# Patient Record
Sex: FEMALE | Race: WHITE | NOT HISPANIC OR LATINO | Employment: UNEMPLOYED | URBAN - METROPOLITAN AREA
[De-identification: names, ages, dates, MRNs, and addresses within clinical notes are randomized per-mention and may not be internally consistent; named-entity substitution may affect disease eponyms.]

---

## 2019-07-27 PROBLEM — R29.2 HYPERREFLEXIA: Status: ACTIVE | Noted: 2019-07-27

## 2019-07-27 PROBLEM — R20.0 RIGHT SIDED NUMBNESS: Status: ACTIVE | Noted: 2019-07-27

## 2019-07-27 PROBLEM — R29.810 FACIAL DROOP: Status: ACTIVE | Noted: 2019-07-27

## 2019-07-27 PROBLEM — I63.9 INFARCTION OF LEFT BASAL GANGLIA: Status: ACTIVE | Noted: 2019-07-27

## 2019-07-27 PROBLEM — R25.2 SPASTICITY: Status: ACTIVE | Noted: 2019-07-27

## 2019-07-28 PROBLEM — R51.9 HEADACHE: Status: ACTIVE | Noted: 2019-07-28

## 2019-07-29 PROBLEM — I47.29 NSVT (NONSUSTAINED VENTRICULAR TACHYCARDIA): Status: ACTIVE | Noted: 2019-07-29

## 2019-07-29 PROBLEM — R00.0 TACHYCARDIA: Status: ACTIVE | Noted: 2019-07-29

## 2019-07-30 PROBLEM — R00.0 TACHYCARDIA: Status: RESOLVED | Noted: 2019-07-29 | Resolved: 2019-07-30

## 2019-08-01 ENCOUNTER — HOSPITAL ENCOUNTER (INPATIENT)
Facility: HOSPITAL | Age: 39
LOS: 4 days | Discharge: HOME | DRG: 091 | End: 2019-08-05
Attending: PSYCHIATRY & NEUROLOGY | Admitting: PSYCHIATRY & NEUROLOGY
Payer: MEDICAID

## 2019-08-01 DIAGNOSIS — I63.9 STROKE: ICD-10-CM

## 2019-08-01 PROBLEM — R29.810 FACIAL DROOP: Status: RESOLVED | Noted: 2019-07-27 | Resolved: 2019-08-01

## 2019-08-01 LAB
BACTERIA #/AREA URNS AUTO: NORMAL /HPF
BILIRUB UR QL STRIP: NEGATIVE
CLARITY UR REFRACT.AUTO: ABNORMAL
COLOR UR AUTO: YELLOW
GLUCOSE UR QL STRIP: NEGATIVE
HGB UR QL STRIP: ABNORMAL
KETONES UR QL STRIP: NEGATIVE
LEUKOCYTE ESTERASE UR QL STRIP: NEGATIVE
MICROSCOPIC COMMENT: NORMAL
NITRITE UR QL STRIP: NEGATIVE
PH UR STRIP: 5 [PH] (ref 5–8)
PROT UR QL STRIP: NEGATIVE
RBC #/AREA URNS AUTO: 2 /HPF (ref 0–4)
SP GR UR STRIP: 1.02 (ref 1–1.03)
SQUAMOUS #/AREA URNS AUTO: 7 /HPF
URN SPEC COLLECT METH UR: ABNORMAL
WBC #/AREA URNS AUTO: 0 /HPF (ref 0–5)

## 2019-08-01 PROCEDURE — 20600001 HC STEP DOWN PRIVATE ROOM

## 2019-08-01 PROCEDURE — 81001 URINALYSIS AUTO W/SCOPE: CPT

## 2019-08-01 PROCEDURE — 99223 1ST HOSP IP/OBS HIGH 75: CPT | Mod: ,,, | Performed by: PSYCHIATRY & NEUROLOGY

## 2019-08-01 PROCEDURE — 63600175 PHARM REV CODE 636 W HCPCS: Performed by: STUDENT IN AN ORGANIZED HEALTH CARE EDUCATION/TRAINING PROGRAM

## 2019-08-01 PROCEDURE — 99223 PR INITIAL HOSPITAL CARE,LEVL III: ICD-10-PCS | Mod: ,,, | Performed by: PSYCHIATRY & NEUROLOGY

## 2019-08-01 PROCEDURE — 25000003 PHARM REV CODE 250: Performed by: STUDENT IN AN ORGANIZED HEALTH CARE EDUCATION/TRAINING PROGRAM

## 2019-08-01 RX ORDER — CLOPIDOGREL BISULFATE 75 MG/1
75 TABLET ORAL DAILY
Status: DISCONTINUED | OUTPATIENT
Start: 2019-08-01 | End: 2019-08-05 | Stop reason: HOSPADM

## 2019-08-01 RX ORDER — RAMELTEON 8 MG/1
8 TABLET ORAL NIGHTLY PRN
Status: DISCONTINUED | OUTPATIENT
Start: 2019-08-01 | End: 2019-08-05 | Stop reason: HOSPADM

## 2019-08-01 RX ORDER — ASPIRIN 81 MG/1
81 TABLET ORAL DAILY
Status: DISCONTINUED | OUTPATIENT
Start: 2019-08-02 | End: 2019-08-01

## 2019-08-01 RX ORDER — ACETAMINOPHEN 325 MG/1
650 TABLET ORAL EVERY 6 HOURS PRN
Status: DISCONTINUED | OUTPATIENT
Start: 2019-08-01 | End: 2019-08-05 | Stop reason: HOSPADM

## 2019-08-01 RX ORDER — SODIUM CHLORIDE 0.9 % (FLUSH) 0.9 %
10 SYRINGE (ML) INJECTION
Status: DISCONTINUED | OUTPATIENT
Start: 2019-08-01 | End: 2019-08-05 | Stop reason: HOSPADM

## 2019-08-01 RX ORDER — HEPARIN SODIUM 5000 [USP'U]/ML
5000 INJECTION, SOLUTION INTRAVENOUS; SUBCUTANEOUS ONCE
Status: COMPLETED | OUTPATIENT
Start: 2019-08-01 | End: 2019-08-01

## 2019-08-01 RX ORDER — ASPIRIN 325 MG
325 TABLET, DELAYED RELEASE (ENTERIC COATED) ORAL DAILY
Status: DISCONTINUED | OUTPATIENT
Start: 2019-08-01 | End: 2019-08-05 | Stop reason: HOSPADM

## 2019-08-01 RX ORDER — CLOPIDOGREL BISULFATE 75 MG/1
75 TABLET ORAL DAILY
Status: DISCONTINUED | OUTPATIENT
Start: 2019-08-02 | End: 2019-08-01

## 2019-08-01 RX ORDER — ATORVASTATIN CALCIUM 20 MG/1
40 TABLET, FILM COATED ORAL DAILY
Status: DISCONTINUED | OUTPATIENT
Start: 2019-08-02 | End: 2019-08-01

## 2019-08-01 RX ORDER — ONDANSETRON 4 MG/1
4 TABLET, FILM COATED ORAL EVERY 8 HOURS PRN
Status: DISCONTINUED | OUTPATIENT
Start: 2019-08-01 | End: 2019-08-05 | Stop reason: HOSPADM

## 2019-08-01 RX ORDER — ATORVASTATIN CALCIUM 20 MG/1
80 TABLET, FILM COATED ORAL DAILY
Status: DISCONTINUED | OUTPATIENT
Start: 2019-08-02 | End: 2019-08-05 | Stop reason: HOSPADM

## 2019-08-01 RX ORDER — LIDOCAINE 50 MG/G
OINTMENT TOPICAL
Status: DISCONTINUED | OUTPATIENT
Start: 2019-08-01 | End: 2019-08-05 | Stop reason: HOSPADM

## 2019-08-01 RX ORDER — ASPIRIN 325 MG
325 TABLET, DELAYED RELEASE (ENTERIC COATED) ORAL DAILY
Status: DISCONTINUED | OUTPATIENT
Start: 2019-08-02 | End: 2019-08-01

## 2019-08-01 RX ADMIN — ASPIRIN 325 MG: 325 TABLET, COATED ORAL at 07:08

## 2019-08-01 RX ADMIN — ONDANSETRON HYDROCHLORIDE 4 MG: 4 TABLET, FILM COATED ORAL at 07:08

## 2019-08-01 RX ADMIN — HEPARIN SODIUM 5000 UNITS: 5000 INJECTION, SOLUTION INTRAVENOUS; SUBCUTANEOUS at 07:08

## 2019-08-01 RX ADMIN — RAMELTEON 8 MG: 8 TABLET, FILM COATED ORAL at 09:08

## 2019-08-01 RX ADMIN — LIDOCAINE: 50 OINTMENT TOPICAL at 08:08

## 2019-08-01 RX ADMIN — CLOPIDOGREL 75 MG: 75 TABLET, FILM COATED ORAL at 07:08

## 2019-08-01 NOTE — PROGRESS NOTES
Pt arrived to unit; no c/o pain; no acute distressed noted; family at bedside; oriented to room and call light,  instructed you call for assistance; all safety measures in place, will continue to monitor pt carefully.

## 2019-08-01 NOTE — HPI
40 yo F who was previously healthy until June 21st stroke presenting with R hemiparesis and speech difficulty, found to have L basal ganglia stroke.  Started on DAPT and had some PT/OT/SLP.  Began to have some improvement in symptoms but then noted LLE shaking/cramping on 7/1/19, admitted at [facility] (records at bedside) with negative work up for LLE pathology, started on baclofen.  Patient states that she has been having headaches over the past month or two, had been taking NSAIDs, now has prn Fioricet. During this admission to [place] for the LLE symptoms an MRI Brain was repeated with no new strokes.  CTA was also done and was concerning for vasculitis.  She was started on 5 d IV steroids with some further improvement in R-sided weakness and speech.  Sent home, continued to participate in PT/OT/SLP.  July 27th, patient was brought to [facility] for worsening memory, with family thinking they were part of the [facility] system and would maybe be able to determine what was causing patient's strokes more than [facility].  Another MRI Brain was done which showed only the L BG stroke, but another CTA showed significant narrowing of the distal L M1 and radiology report suggested vasculitis.  Headaches have been present throughout.  [facility] Neurologist started patient on acyclovir empirically while awaiting LP results to protect her from HSV vasculitis.  Noted to have PBA at [facility].  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  Plan for transfer to [facility] for IR angiogram to further work up question of vasculitis vs Moyamoya vs RCVS.  Patient is seen today at bedside with her niece.  [place] records are at bedside for review as well.  Both have difficulty recalling specific medications but seem to understand the general plan.  See below for work up from OSH.  Patient feels well today, ASA and plavix were held at OSH for  LP.  Can hold for IR angiogram with plan for am procedure and resuming antiplatelets afterward.

## 2019-08-01 NOTE — ASSESSMENT & PLAN NOTE
-prn acetaminophen, prn topical lidocaine gel  -Had been taking Fioricet outpatient, need to limit to < 3 days per week to avoid rebound headache

## 2019-08-01 NOTE — H&P
[facility]  Vascular Neurology  Comprehensive Stroke Center  History & Physical    Consults  Assessment/Plan:     38 yo F who was previously healthy until June 21st stroke presenting with R hemiparesis and speech difficulty, found to have L basal ganglia stroke. Transferred to [facility] for IR cerebral angiogram due to concern for vasculitis initially then Moyamoya disease on vessel imaging.    Infarction of left basal ganglia  38 yo previously healthy F with recent L basal ganglia stroke leading to dysarthria and R-sided weakness as well as several weeks of intermittent symptoms of confusion, LLE spasticity, and emotional lability following initial stroke.  Work up notable for vessel imaging initially concerning for vasculitis, later with radiology noting Moyamoya pathology.  Transferred to [facility] for IR angiogram to evaluate.    Antithrombotics for secondary stroke prevention:  Start ASA, clopidogrel after angiogram  Statins for secondary stroke prevention/HLD: Atorvastatin 40 mg qd  Aggressive risk factor modification: Diet, Exercise  Rehab efforts: PT/OT/SLP  Diagnostics ordered/pending: Other: IR cerebral angiogram  VTE prophylaxis: Heparin 5000 units SQ every 8 hours  BP parameters: Infarct: No intervention, SBP <220    Unsteady gait  -S/p L basal ganglia infarct, PT/OT to evaluate and treat    Dysarthria  -S/p L basal ganglia infarct, SLP to evaluate and treat    Right sided numbness  -S/p L basal ganglia infarct, PT/OT to evaluate and treat    Spasticity  -Continue baclofen 5 mg bid prn muscle spasms, started at OSH.  Titrate up as needed.    Headache  -prn acetaminophen, prn topical lidocaine gel  -Had been taking Fioricet outpatient, need to limit to < 3 days per week to avoid rebound headache    STROKE DOCUMENTATION      NIH Scale:  Interval: baseline  1a. Level of Consciousness: 0-->Alert, keenly responsive  1b. LOC Questions: 0-->Answers both questions correctly  1c. LOC Commands:  0-->Performs both tasks correctly  2. Best Gaze: 0-->Normal  3. Visual: 0-->No visual loss  4. Facial Palsy: 0-->Normal symmetrical movements  5a. Motor Arm, Left: 0-->No drift, limb holds 90 (or 45) degrees for full 10 secs  5b. Motor Arm, Right: 1-->Drift, limb holds 90 (or 45) degrees, but drifts down before full 10 secs, does not hit bed or other support  6a. Motor Leg, Left: 0-->No drift, leg holds 30 degree position for full 5 secs  6b. Motor Leg, Right: 0-->No drift, leg holds 30 degree position for full 5 secs  7. Limb Ataxia: 0-->Absent  8. Sensory: 0-->Normal, no sensory loss  9. Best Language: 1-->Mild-to-moderate aphasia, some obvious loss of fluency or facility of comprehension, without significant limitation on ideas expressed or form of expression. Reduction of speech and/or comprehension, however, makes conversation. . . (see row details)(Slow speech, rare word-finding and naming difficulty)  10. Dysarthria: 1-->Mild-to-moderate dysarthria, patient slurs at least some words and, at worst, can be understood with some difficulty  11. Extinction and Inattention (formerly Neglect): 0-->No abnormality  Total (NIH Stroke Scale): 3     Modified Loving Score: 2  [place] Coma Scale:15   ABCD2 Score:    [id number] Score:   HAS -BLED Score:   ICH Score:   Hunt & Sarabia Classification:      Thrombolysis Candidate? No, Out of window     Delays to Thrombolysis?  No    Interventional Revascularization Candidate?   Is the patient eligible for mechanical endovascular reperfusion (FRANCISCO)?  No;  Large core infarct and No; No large vessel occlusion    Hemorrhagic change of an Ischemic Stroke: Does this patient have an ischemic stroke with hemorrhagic changes? No     Subjective:     History of Present Illness:  38 yo F who was previously healthy until June 21st stroke presenting with R hemiparesis and speech difficulty, found to have L basal ganglia stroke.  Started on DAPT and had some PT/OT/SLP.  Began to have some  improvement in symptoms but then noted LLE shaking/cramping on 7/1/19, admitted at [facility] (records at bedside) with negative work up for LLE pathology, started on baclofen.  Patient states that she has been having headaches over the past month or two, had been taking NSAIDs, now has prn Fioricet. During this admission to [place] for the LLE symptoms an MRI Brain was repeated with no new strokes.  CTA was also done and was concerning for vasculitis.  She was started on 5 d IV steroids with some further improvement in R-sided weakness and speech.  Sent home, continued to participate in PT/OT/SLP.  July 27th, patient was brought to [facility] for worsening memory, with family thinking they were part of the [facility] system and would maybe be able to determine what was causing patient's strokes more than [facility].  Another MRI Brain was done which showed only the L BG stroke, but another CTA showed significant narrowing of the distal L M1 and radiology report suggested vasculitis.  Headaches have been present throughout.  [facility] Neurologist started patient on acyclovir empirically while awaiting LP results to protect her from HSV vasculitis.  Noted to have PBA at [facility].  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  Plan for transfer to [facility] for IR angiogram to further work up question of vasculitis vs Moyamoya vs RCVS.  Patient is seen today at bedside with her niece.  [place] records are at bedside for review as well.  Both have difficulty recalling specific medications but seem to understand the general plan.  See below for work up from OSH.  Patient feels well today, ASA and plavix were held at OSH for LP.  Can hold for IR angiogram with plan for am procedure and resuming antiplatelets afterward.          Past Medical History:   Diagnosis Date    High cholesterol      Past Surgical History:   Procedure  Laterality Date    TUBAL LIGATION  2005     Family History   Problem Relation Age of Onset    Hyperlipidemia Mother     Stroke Father     Diabetes Father     Heart disease Father     Hyperlipidemia Sister     Hypertension Sister     Cancer Brother     Asthma Brother      Social History     Tobacco Use    Smoking status: Never Smoker    Smokeless tobacco: Never Used   Substance Use Topics    Alcohol use: Never     Frequency: Never    Drug use: Never     Review of patient's allergies indicates:  No Known Allergies    Medications: I have reviewed the current medication administration record.    Medications Prior to Admission   Medication Sig Dispense Refill Last Dose    aspirin (ECOTRIN) 325 MG EC tablet Take 325 mg by mouth once daily.   7/27/2019    atorvastatin (LIPITOR) 40 MG tablet Take 40 mg by mouth every evening.       baclofen (LIORESAL) 10 MG tablet Take 5 mg by mouth 2 (two) times daily.       butalbital-acetaminophen-caffeine -40 mg (FIORICET, ESGIC) -40 mg per tablet Take 1 tablet by mouth every 4 (four) hours as needed for Pain.       clopidogrel (PLAVIX) 75 mg tablet Take 75 mg by mouth once daily.   7/27/2019    cyanocobalamin, vitamin B-12, (VITAMIN B-12) 50 mcg tablet Take 50 mcg by mouth once daily.   7/27/2019    montelukast (SINGULAIR) 10 mg tablet Take 10 mg by mouth every evening.       potassium gluconate 595 mg (99 mg) Tab Take by mouth once.   7/27/2019     Review of Systems   Constitutional: Positive for fatigue. Negative for chills and fever.   HENT: Negative for rhinorrhea and sneezing.    Eyes: Negative for photophobia and visual disturbance.   Respiratory: Negative for cough and shortness of breath.    Cardiovascular: Negative for palpitations and leg swelling.   Gastrointestinal: Negative for nausea and vomiting.   Musculoskeletal: Positive for neck pain. Negative for neck stiffness.   Skin: Negative for rash and wound.   Neurological: Positive for speech  difficulty, weakness and numbness. Negative for facial asymmetry.   Hematological: Negative for adenopathy. Does not bruise/bleed easily.   Psychiatric/Behavioral: Negative for agitation and confusion.     Objective:     Vital Signs (Most Recent):  Temp: 98.4 °F (36.9 °C) (08/01/19 1530)  Pulse: 97 (08/01/19 1530)  Resp: 17 (08/01/19 1530)  BP: 108/76 (08/01/19 1530)  SpO2: 100 % (08/01/19 1530)    Vital Signs Range (Last 24H):  Temp:  [97.4 °F (36.3 °C)-98.6 °F (37 °C)]   Pulse:  []   Resp:  [16-18]   BP: (100-121)/(64-83)   SpO2:  [97 %-100 %]     Physical Exam  General:  Well-developed, well-nourished, nad  HEENT:  NCAT, PERRLA, EOMI, oropharyngeal membranes non-erythematous/without exudate  Neck:  Supple, normal ROM without nuchal rigidity  Resp:  Symmetric expansion, no increased wob  CVS:  No LE edema, peripheral pulses 2+ (radial, dorsalis pedis)  GI:  Abd soft, non-distended, non-tender to palpation  Neurologic Exam:  Mental Status:  AAOx3.  Speech, thought content appropriate.  Able to spell 'world' forward with 1 error and backward with no errors.  Cranial Nerves:  VFs intact on counting fingers in all quadrants bilaterally.  PERRLA, EOMI.  Facial movement, sensation intact and symmetric.  Palate raises symmetrically, tongue protrudes midline.  Trapezius 5/5 bilaterally.  Motor:  Normal bulk and tone.  BUE shoulder abduction, biceps 5/5.  RUE weakness--+pronator drift, 4+/5 in triceps, 4+/5 in wrist extension, 4+/5  strength.  BLE hip flexors, knee flexion/extension, plantarflexion/dorsiflexion 5/5.  Sensory:  Intact to light touch at all extremities and face without inattention.   Reflexes:  Biceps, brachioradialis, patellar 2+ and symmetric.  No ankle clonus.   Coordination:  FNF, BHARATH, HTS intact with no dysmetria/ataxia/dysdiadochokinesia.  No resting tremor or myoclonus.  Gait:  Normal balance with appropriate arm swing, normal posture, no shuffling, no magnetic gait.  Mild circumduction of  RLE, may be some mild hip flexor weakness.    Neurological Exam:   LOC: alert  Attention Span: Good   Language: Naming impaired  Articulation: Dysarthria  Orientation: Person, Place, Time   Visual Fields: Full  EOM (CN III, IV, VI): Full/intact  Pupils (CN II, III): PERRL  Facial Sensation (CN V): Normal  Facial Movement (CN VII): Symmetric facial expression    Motor: Arm left  Normal 5/5  Leg left  Normal 5/5  Arm right  Paresis: 4/5  Leg right Normal 5/5  Cebellar: No evidence of appendicular or axial ataxia  Sensation: Intact to light touch, temperature and vibration  Tone: Normal tone throughout    Laboratory:  CMP: PENDING    CBC:   Recent Labs   Lab 19  1226   WBC 7.89   RBC 4.34   HGB 12.5   HCT 38.6      MCV 89   MCH 28.8   MCHC 32.4     Lipid Panel:   Recent Labs   Lab 19  0555   CHOL 135   LDLCALC 74.6   HDL 45   TRIG 77     Coagulation:   Recent Labs   Lab 19  0732   INR 0.9   APTT 26.6     Hgb A1C:   Recent Labs   Lab 19  1226   HGBA1C 5.5     TSH:   Recent Labs   Lab 19  1226   TSH 1.470     Diagnostic Results:    Brain imagin19 MRI Brain w/ w/o contrast:  1. Intermediate restricted diffusion signal in the left basal ganglia with faint enhancement and low associated ADC signal is most consistent with a subacute infarct    19 MRI C, T spine demyelinating w/ w/o contrast:  1.  There is a nonspecific, nonenhancing faint high STIR signal focus in the left paramedian ventral aspect of the medulla. This could reflect a demyelinating plaque or an acute lacunar infarction.  2. No cord signal abnormality in the thoracic spine.  3. There is a focal left subarticular disc protrusion causing mild narrowing of the left lateral recess of the canal.    19 MRI Brain w/ w/o contrast:  1. Likely old left posterior putaminal infarct with encephalomalacia.  Subacute infarct of the caudate head and anterior and extreme posterior left putamen.  2. Non-specific slight  increased tonsillar ectopia with mild crowding at the foramen magnum compared to study from 2019 preserved rounding of the inferior tonsillar tips.  3. Stable subtle nonspecific sulcal FLAIR signal abnormality in the occipital lobes bilaterally.    Vessel Imagin19 CTA head and neck:  1.  There is marked narrowing of the distal portion of the M1 segment of the left middle cerebral artery (see for example sequence 13, image 46) and no visible perfusion of the M2 and M3 branches of the left middle cerebral artery.  2. The left anterior cerebral artery is perfused by the right internal carotid artery via the anterior communicating artery.  This is likely a normal variant.  3. There is no other cervical carotid or intracranial vascular abnormality.    19 MRA Brain w/o contrast:  Small left MCA with moyamoya physiology on the left.    Cardiac Evaluation:   19 TTE with CFD, Shunt Study:  · Normal left ventricular systolic function. The estimated ejection fraction is 55%  · Normal LV diastolic function.  · Normal right ventricular systolic function.  · The estimated PA systolic pressure is 17 mm Hg  · Study is negative for shunt    Katie Cerda MD  Comprehensive Stroke Center  Department of Vascular Neurology   [facility]

## 2019-08-01 NOTE — SUBJECTIVE & OBJECTIVE
Past Medical History:   Diagnosis Date    High cholesterol      Past Surgical History:   Procedure Laterality Date    TUBAL LIGATION  2005     Family History   Problem Relation Age of Onset    Hyperlipidemia Mother     Stroke Father     Diabetes Father     Heart disease Father     Hyperlipidemia Sister     Hypertension Sister     Cancer Brother     Asthma Brother      Social History     Tobacco Use    Smoking status: Never Smoker    Smokeless tobacco: Never Used   Substance Use Topics    Alcohol use: Never     Frequency: Never    Drug use: Never     Review of patient's allergies indicates:  No Known Allergies    Medications: I have reviewed the current medication administration record.    Medications Prior to Admission   Medication Sig Dispense Refill Last Dose    aspirin (ECOTRIN) 325 MG EC tablet Take 325 mg by mouth once daily.   7/27/2019    atorvastatin (LIPITOR) 40 MG tablet Take 40 mg by mouth every evening.       baclofen (LIORESAL) 10 MG tablet Take 5 mg by mouth 2 (two) times daily.       butalbital-acetaminophen-caffeine -40 mg (FIORICET, ESGIC) -40 mg per tablet Take 1 tablet by mouth every 4 (four) hours as needed for Pain.       clopidogrel (PLAVIX) 75 mg tablet Take 75 mg by mouth once daily.   7/27/2019    cyanocobalamin, vitamin B-12, (VITAMIN B-12) 50 mcg tablet Take 50 mcg by mouth once daily.   7/27/2019    montelukast (SINGULAIR) 10 mg tablet Take 10 mg by mouth every evening.       potassium gluconate 595 mg (99 mg) Tab Take by mouth once.   7/27/2019     Review of Systems   Constitutional: Positive for fatigue. Negative for chills and fever.   HENT: Negative for rhinorrhea and sneezing.    Eyes: Negative for photophobia and visual disturbance.   Respiratory: Negative for cough and shortness of breath.    Cardiovascular: Negative for palpitations and leg swelling.   Gastrointestinal: Negative for nausea and vomiting.   Musculoskeletal: Positive for neck  pain. Negative for neck stiffness.   Skin: Negative for rash and wound.   Neurological: Positive for speech difficulty, weakness and numbness. Negative for facial asymmetry.   Hematological: Negative for adenopathy. Does not bruise/bleed easily.   Psychiatric/Behavioral: Negative for agitation and confusion.     Objective:     Vital Signs (Most Recent):  Temp: 98.4 °F (36.9 °C) (08/01/19 1530)  Pulse: 97 (08/01/19 1530)  Resp: 17 (08/01/19 1530)  BP: 108/76 (08/01/19 1530)  SpO2: 100 % (08/01/19 1530)    Vital Signs Range (Last 24H):  Temp:  [97.4 °F (36.3 °C)-98.6 °F (37 °C)]   Pulse:  []   Resp:  [16-18]   BP: (100-121)/(64-83)   SpO2:  [97 %-100 %]     Physical Exam  General:  Well-developed, well-nourished, nad  HEENT:  NCAT, PERRLA, EOMI, oropharyngeal membranes non-erythematous/without exudate  Neck:  Supple, normal ROM without nuchal rigidity  Resp:  Symmetric expansion, no increased wob  CVS:  No LE edema, peripheral pulses 2+ (radial, dorsalis pedis)  GI:  Abd soft, non-distended, non-tender to palpation  Neurologic Exam:  Mental Status:  AAOx3.  Speech, thought content appropriate.  Able to spell 'world' forward with 1 error and backward with no errors.  Cranial Nerves:  VFs intact on counting fingers in all quadrants bilaterally.  PERRLA, EOMI.  Facial movement, sensation intact and symmetric.  Palate raises symmetrically, tongue protrudes midline.  Trapezius 5/5 bilaterally.  Motor:  Normal bulk and tone.  BUE shoulder abduction, biceps 5/5.  RUE weakness--+pronator drift, 4+/5 in triceps, 4+/5 in wrist extension, 4+/5  strength.  BLE hip flexors, knee flexion/extension, plantarflexion/dorsiflexion 5/5.  Sensory:  Intact to light touch at all extremities and face without inattention.   Reflexes:  Biceps, brachioradialis, patellar 2+ and symmetric.  No ankle clonus.   Coordination:  FNF, BHARATH, HTS intact with no dysmetria/ataxia/dysdiadochokinesia.  No resting tremor or myoclonus.  Gait:  Normal  balance with appropriate arm swing, normal posture, no shuffling, no magnetic gait.  Mild circumduction of RLE, may be some mild hip flexor weakness.    Neurological Exam:   LOC: alert  Attention Span: Good   Language: Naming impaired  Articulation: Dysarthria  Orientation: Person, Place, Time   Visual Fields: Full  EOM (CN III, IV, VI): Full/intact  Pupils (CN II, III): PERRL  Facial Sensation (CN V): Normal  Facial Movement (CN VII): Symmetric facial expression    Motor: Arm left  Normal 5/5  Leg left  Normal 5/5  Arm right  Paresis: 4/5  Leg right Normal 5/5  Cebellar: No evidence of appendicular or axial ataxia  Sensation: Intact to light touch, temperature and vibration  Tone: Normal tone throughout    Laboratory:  CMP: PENDING    CBC:   Recent Labs   Lab 19  1226   WBC 7.89   RBC 4.34   HGB 12.5   HCT 38.6      MCV 89   MCH 28.8   MCHC 32.4     Lipid Panel:   Recent Labs   Lab 19  0555   CHOL 135   LDLCALC 74.6   HDL 45   TRIG 77     Coagulation:   Recent Labs   Lab 19  0732   INR 0.9   APTT 26.6     Hgb A1C:   Recent Labs   Lab 19  1226   HGBA1C 5.5     TSH:   Recent Labs   Lab 19  1226   TSH 1.470     Diagnostic Results:    Brain imagin19 MRI Brain w/ w/o contrast:  1. Intermediate restricted diffusion signal in the left basal ganglia with faint enhancement and low associated ADC signal is most consistent with a subacute infarct    19 MRI C, T spine demyelinating w/ w/o contrast:  1.  There is a nonspecific, nonenhancing faint high STIR signal focus in the left paramedian ventral aspect of the medulla. This could reflect a demyelinating plaque or an acute lacunar infarction.  2. No cord signal abnormality in the thoracic spine.  3. There is a focal left subarticular disc protrusion causing mild narrowing of the left lateral recess of the canal.    19 MRI Brain w/ w/o contrast:  1. Likely old left posterior putaminal infarct with encephalomalacia.   Subacute infarct of the caudate head and anterior and extreme posterior left putamen.  2. Non-specific slight increased tonsillar ectopia with mild crowding at the foramen magnum compared to study from 2019 preserved rounding of the inferior tonsillar tips.  3. Stable subtle nonspecific sulcal FLAIR signal abnormality in the occipital lobes bilaterally.    Vessel Imagin19 CTA head and neck:  1.  There is marked narrowing of the distal portion of the M1 segment of the left middle cerebral artery (see for example sequence 13, image 46) and no visible perfusion of the M2 and M3 branches of the left middle cerebral artery.  2. The left anterior cerebral artery is perfused by the right internal carotid artery via the anterior communicating artery.  This is likely a normal variant.  3. There is no other cervical carotid or intracranial vascular abnormality.    19 MRA Brain w/o contrast:  Small left MCA with moyamoya physiology on the left.    Cardiac Evaluation:   19 TTE with CFD, Shunt Study:  · Normal left ventricular systolic function. The estimated ejection fraction is 55%  · Normal LV diastolic function.  · Normal right ventricular systolic function.  · The estimated PA systolic pressure is 17 mm Hg  · Study is negative for shunt

## 2019-08-01 NOTE — ASSESSMENT & PLAN NOTE
40 yo previously healthy F with recent L basal ganglia stroke leading to dysarthria and R-sided weakness as well as several weeks of intermittent symptoms of confusion, LLE spasticity, and emotional lability following initial stroke.  Work up notable for vessel imaging initially concerning for vasculitis, later with radiology noting Moyamoya pathology.  Transferred to [facility] for IR angiogram to evaluate.    Antithrombotics for secondary stroke prevention:  Start ASA, clopidogrel after angiogram  Statins for secondary stroke prevention/HLD: Atorvastatin 40 mg qd  Aggressive risk factor modification: Diet, Exercise  Rehab efforts: PT/OT/SLP  Diagnostics ordered/pending: Other: IR cerebral angiogram  VTE prophylaxis: Heparin 5000 units SQ every 8 hours  BP parameters: Infarct: No intervention, SBP <220

## 2019-08-01 NOTE — HOSPITAL COURSE
Ms. Ally Ponce was admitted to Vascular Neurology for acute stroke workup. Stroke etiology suspected to be vasculitis at this time. Angiography findings demonstrated focal tapering of the distal M1; no clear underlying cause upon review of rheumatology panel, CSF studies, etc. obtained at outside facility. 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 Pt c/o intermittent headaches; encouraged good PO hydration and strict medication compliance at home.  Patient discharged with recommendations for outpatient therapy. Family at bedside amenable to plan.     Patient with improvement in stroke symptoms since admission. Inpatient acute stroke work up completed and patient stable for discharge. Please see all appropriate medication changes, imaging results, and necessary follow-up below.

## 2019-08-02 LAB
ALBUMIN SERPL BCP-MCNC: 3.7 G/DL (ref 3.5–5.2)
ALP SERPL-CCNC: 91 U/L (ref 55–135)
ALT SERPL W/O P-5'-P-CCNC: 17 U/L (ref 10–44)
ANION GAP SERPL CALC-SCNC: 9 MMOL/L (ref 8–16)
APTT BLDCRRT: <21 SEC (ref 21–32)
AST SERPL-CCNC: 15 U/L (ref 10–40)
BASOPHILS # BLD AUTO: 0.05 K/UL (ref 0–0.2)
BASOPHILS NFR BLD: 0.8 % (ref 0–1.9)
BILIRUB SERPL-MCNC: 0.3 MG/DL (ref 0.1–1)
BUN SERPL-MCNC: 10 MG/DL (ref 6–20)
CALCIUM SERPL-MCNC: 9.1 MG/DL (ref 8.7–10.5)
CHLORIDE SERPL-SCNC: 104 MMOL/L (ref 95–110)
CK MB SERPL-MCNC: 0.8 NG/ML (ref 0.1–6.5)
CK MB SERPL-RTO: 1.8 % (ref 0–5)
CK SERPL-CCNC: 45 U/L (ref 20–180)
CO2 SERPL-SCNC: 25 MMOL/L (ref 23–29)
CREAT SERPL-MCNC: 0.8 MG/DL (ref 0.5–1.4)
DIFFERENTIAL METHOD: ABNORMAL
EOSINOPHIL # BLD AUTO: 0.2 K/UL (ref 0–0.5)
EOSINOPHIL NFR BLD: 3.6 % (ref 0–8)
ERYTHROCYTE [DISTWIDTH] IN BLOOD BY AUTOMATED COUNT: 14.3 % (ref 11.5–14.5)
EST. GFR  (AFRICAN AMERICAN): >60 ML/MIN/1.73 M^2
EST. GFR  (NON AFRICAN AMERICAN): >60 ML/MIN/1.73 M^2
GLUCOSE SERPL-MCNC: 109 MG/DL (ref 70–110)
HCT VFR BLD AUTO: 41.1 % (ref 37–48.5)
HGB BLD-MCNC: 13.1 G/DL (ref 12–16)
IMM GRANULOCYTES # BLD AUTO: 0.02 K/UL (ref 0–0.04)
IMM GRANULOCYTES NFR BLD AUTO: 0.3 % (ref 0–0.5)
INR PPP: 0.9 (ref 0.8–1.2)
LYMPHOCYTES # BLD AUTO: 2 K/UL (ref 1–4.8)
LYMPHOCYTES NFR BLD: 32.4 % (ref 18–48)
MAGNESIUM SERPL-MCNC: 2 MG/DL (ref 1.6–2.6)
MCH RBC QN AUTO: 29 PG (ref 27–31)
MCHC RBC AUTO-ENTMCNC: 31.9 G/DL (ref 32–36)
MCV RBC AUTO: 91 FL (ref 82–98)
MONOCYTES # BLD AUTO: 0.6 K/UL (ref 0.3–1)
MONOCYTES NFR BLD: 10.5 % (ref 4–15)
NEUTROPHILS # BLD AUTO: 3.2 K/UL (ref 1.8–7.7)
NEUTROPHILS NFR BLD: 52.4 % (ref 38–73)
NRBC BLD-RTO: 0 /100 WBC
PHOSPHATE SERPL-MCNC: 3.3 MG/DL (ref 2.7–4.5)
PLATELET # BLD AUTO: 209 K/UL (ref 150–350)
PMV BLD AUTO: 11.1 FL (ref 9.2–12.9)
POTASSIUM SERPL-SCNC: 3.7 MMOL/L (ref 3.5–5.1)
PROT SERPL-MCNC: 7.1 G/DL (ref 6–8.4)
PROTHROMBIN TIME: 9.8 SEC (ref 9–12.5)
RBC # BLD AUTO: 4.51 M/UL (ref 4–5.4)
SODIUM SERPL-SCNC: 138 MMOL/L (ref 136–145)
TROPONIN I SERPL DL<=0.01 NG/ML-MCNC: <0.006 NG/ML (ref 0–0.03)
WBC # BLD AUTO: 6.12 K/UL (ref 3.9–12.7)

## 2019-08-02 PROCEDURE — 92523 SPEECH SOUND LANG COMPREHEN: CPT

## 2019-08-02 PROCEDURE — 63600175 PHARM REV CODE 636 W HCPCS: Performed by: RADIOLOGY

## 2019-08-02 PROCEDURE — 97166 OT EVAL MOD COMPLEX 45 MIN: CPT

## 2019-08-02 PROCEDURE — 85610 PROTHROMBIN TIME: CPT

## 2019-08-02 PROCEDURE — 82550 ASSAY OF CK (CPK): CPT

## 2019-08-02 PROCEDURE — 99233 SBSQ HOSP IP/OBS HIGH 50: CPT | Mod: ,,, | Performed by: PSYCHIATRY & NEUROLOGY

## 2019-08-02 PROCEDURE — 85730 THROMBOPLASTIN TIME PARTIAL: CPT

## 2019-08-02 PROCEDURE — 85025 COMPLETE CBC W/AUTO DIFF WBC: CPT

## 2019-08-02 PROCEDURE — 97112 NEUROMUSCULAR REEDUCATION: CPT

## 2019-08-02 PROCEDURE — 25000003 PHARM REV CODE 250: Performed by: STUDENT IN AN ORGANIZED HEALTH CARE EDUCATION/TRAINING PROGRAM

## 2019-08-02 PROCEDURE — 83735 ASSAY OF MAGNESIUM: CPT

## 2019-08-02 PROCEDURE — 99233 PR SUBSEQUENT HOSPITAL CARE,LEVL III: ICD-10-PCS | Mod: ,,, | Performed by: PSYCHIATRY & NEUROLOGY

## 2019-08-02 PROCEDURE — 25500020 PHARM REV CODE 255: Performed by: PSYCHIATRY & NEUROLOGY

## 2019-08-02 PROCEDURE — 97530 THERAPEUTIC ACTIVITIES: CPT

## 2019-08-02 PROCEDURE — 92610 EVALUATE SWALLOWING FUNCTION: CPT

## 2019-08-02 PROCEDURE — 97161 PT EVAL LOW COMPLEX 20 MIN: CPT

## 2019-08-02 PROCEDURE — 80053 COMPREHEN METABOLIC PANEL: CPT

## 2019-08-02 PROCEDURE — 84484 ASSAY OF TROPONIN QUANT: CPT

## 2019-08-02 PROCEDURE — 82553 CREATINE MB FRACTION: CPT

## 2019-08-02 PROCEDURE — 36415 COLL VENOUS BLD VENIPUNCTURE: CPT

## 2019-08-02 PROCEDURE — 20600001 HC STEP DOWN PRIVATE ROOM

## 2019-08-02 PROCEDURE — 84100 ASSAY OF PHOSPHORUS: CPT

## 2019-08-02 RX ORDER — IODIXANOL 320 MG/ML
200 INJECTION, SOLUTION INTRAVASCULAR
Status: COMPLETED | OUTPATIENT
Start: 2019-08-02 | End: 2019-08-02

## 2019-08-02 RX ORDER — FENTANYL CITRATE 50 UG/ML
INJECTION, SOLUTION INTRAMUSCULAR; INTRAVENOUS CODE/TRAUMA/SEDATION MEDICATION
Status: COMPLETED | OUTPATIENT
Start: 2019-08-02 | End: 2019-08-02

## 2019-08-02 RX ORDER — MIDAZOLAM HYDROCHLORIDE 1 MG/ML
INJECTION INTRAMUSCULAR; INTRAVENOUS CODE/TRAUMA/SEDATION MEDICATION
Status: COMPLETED | OUTPATIENT
Start: 2019-08-02 | End: 2019-08-02

## 2019-08-02 RX ADMIN — ASPIRIN 325 MG: 325 TABLET, COATED ORAL at 01:08

## 2019-08-02 RX ADMIN — CLOPIDOGREL 75 MG: 75 TABLET, FILM COATED ORAL at 01:08

## 2019-08-02 RX ADMIN — FENTANYL CITRATE 50 MCG: 50 INJECTION, SOLUTION INTRAMUSCULAR; INTRAVENOUS at 11:08

## 2019-08-02 RX ADMIN — MIDAZOLAM HYDROCHLORIDE 1 MG: 1 INJECTION, SOLUTION INTRAMUSCULAR; INTRAVENOUS at 11:08

## 2019-08-02 RX ADMIN — ONDANSETRON HYDROCHLORIDE 4 MG: 4 TABLET, FILM COATED ORAL at 06:08

## 2019-08-02 RX ADMIN — RAMELTEON 8 MG: 8 TABLET, FILM COATED ORAL at 09:08

## 2019-08-02 RX ADMIN — FENTANYL CITRATE 25 MCG: 50 INJECTION, SOLUTION INTRAMUSCULAR; INTRAVENOUS at 11:08

## 2019-08-02 RX ADMIN — ATORVASTATIN CALCIUM 80 MG: 20 TABLET, FILM COATED ORAL at 01:08

## 2019-08-02 RX ADMIN — IODIXANOL 60 ML: 320 INJECTION, SOLUTION INTRAVASCULAR at 11:08

## 2019-08-02 NOTE — PROGRESS NOTES
[facility]  Vascular Neurology  Comprehensive Stroke Center  Progress Note    Assessment/Plan:     Headache  -prn acetaminophen, prn topical lidocaine gel  -Had been taking Fioricet outpatient, need to limit to < 3 days per week to avoid rebound headache    Unsteady gait  -S/p L basal ganglia infarct, PT/OT to evaluate and treat    Dysarthria  -S/p L basal ganglia infarct, SLP to evaluate and treat    Spasticity  -Continue baclofen 5 mg bid prn muscle spasms, started at OSH.  Titrate up as needed.    Infarction of left basal ganglia  38 yo previously healthy F with recent L basal ganglia stroke leading to dysarthria and R-sided weakness as well as several weeks of intermittent symptoms of confusion, LLE spasticity, and emotional lability following initial stroke.  Work up notable for vessel imaging initially concerning for vasculitis, later with radiology noting Moyamoya pathology.  [place] [facility]. IR angiogram performed today.    Antithrombotics for secondary stroke prevention:  Start ASA, clopidogrel after angiogram  Statins for secondary stroke prevention/HLD: Atorvastatin 40 mg qd  Aggressive risk factor modification: Diet, Exercise  Rehab efforts: PT/OT/SLP  Diagnostics ordered/pending: Other: IR cerebral angiogram  VTE prophylaxis: Heparin 5000 units SQ every 8 hours  BP parameters: Infarct: No intervention, SBP <220    Right sided numbness  -S/p L basal ganglia infarct, PT/OT to evaluate and treat         08/01/19:  Transfer from [facility]  8/2- received angiogram     STROKE DOCUMENTATION        NIH Scale:  1a. Level of Consciousness: 0-->Alert, keenly responsive  1b. LOC Questions: 0-->Answers both questions correctly  1c. LOC Commands: 0-->Performs both tasks correctly  2. Best Gaze: 0-->Normal  3. Visual: 0-->No visual loss  4. Facial Palsy: 0-->Normal symmetrical movements  5a. Motor Arm, Left: 0-->No drift, limb holds 90 (or 45)  degrees for full 10 secs  5b. Motor Arm, Right: 1-->Drift, limb holds 90 (or 45) degrees, but drifts down before full 10 secs, does not hit bed or other support  6a. Motor Leg, Left: 0-->No drift, leg holds 30 degree position for full 5 secs  6b. Motor Leg, Right: 0-->No drift, leg holds 30 degree position for full 5 secs  7. Limb Ataxia: 0-->Absent  8. Sensory: 0-->Normal, no sensory loss  9. Best Language: 1-->Mild-to-moderate aphasia, some obvious loss of fluency or facility of comprehension, without significant limitation on ideas expressed or form of expression. Reduction of speech and/or comprehension, however, makes conversation. . . (see row details)  10. Dysarthria: 1-->Mild-to-moderate dysarthria, patient slurs at least some words and, at worst, can be understood with some difficulty  11. Extinction and Inattention (formerly Neglect): 0-->No abnormality  Total (NIH Stroke Scale): 3       Modified Cleburne Score: 2  Little Coma Scale:    ABCD2 Score:    [id number]-FIV Score:   HAS -BLED Score:   ICH Score:   Hunt & Sarabia Classification:      Hemorrhagic change of an Ischemic Stroke: Does this patient have an ischemic stroke with hemorrhagic changes? No     Neurologic Chief Complaint: L basal ganglia stroke    Subjective:     Interval History: Patient is seen for follow-up neurological assessment and treatment recommendations: Patient improving today. Received angiogram. Will follow up on results.    HPI, Past Medical, Family, and Social History remains the same as documented in the initial encounter.     Review of Systems   Constitutional: Positive for fatigue.   HENT: Negative for facial swelling.    Eyes: Negative for visual disturbance.   Respiratory: Negative for chest tightness.    Cardiovascular: Negative for chest pain.   Gastrointestinal: Negative for abdominal pain.   Genitourinary: Negative for pelvic pain.   Musculoskeletal: Positive for neck pain.   Neurological: Positive for speech difficulty,  weakness and numbness.   Psychiatric/Behavioral: Negative for agitation.     Scheduled Meds:   aspirin  325 mg Oral Daily    atorvastatin  80 mg Oral Daily    clopidogrel  75 mg Oral Daily     Continuous Infusions:  PRN Meds:acetaminophen, baclofen, lidocaine, ondansetron, ramelteon, sodium chloride 0.9%    Objective:     Vital Signs (Most Recent):  Temp: 97.3 °F (36.3 °C) (08/02/19 1558)  Pulse: 86 (08/02/19 1558)  Resp: 17 (08/02/19 1558)  BP: 106/66 (08/02/19 1558)  SpO2: 96 % (08/02/19 1558)  BP Location: Right arm    Vital Signs Range (Last 24H):  Temp:  [96.2 °F (35.7 °C)-97.9 °F (36.6 °C)]   Pulse:  [65-90]   Resp:  [16-18]   BP: (101-122)/(50-82)   SpO2:  [96 %-100 %]   BP Location: Right arm    Physical Exam   Constitutional: She is oriented to person, place, and time. She appears well-developed and well-nourished.   HENT:   Head: Normocephalic and atraumatic.   Eyes: EOM are normal.   Cardiovascular: Normal rate.   Pulmonary/Chest: Effort normal.   Abdominal: There is no tenderness.   Neurological: She is alert and oriented to person, place, and time.   Psychiatric: She has a normal mood and affect.       Neurological Exam:   LOC: alert  Attention Span: Good   Language: No aphasia, Naming impaired  Articulation: Dysarthria  Orientation: Person, Place, Time   Visual Fields: Full  EOM (CN III, IV, VI): Full/intact  Pupils (CN II, III): PERRL  Facial Sensation (CN V): Normal  Facial Movement (CN VII): Symmetric facial expression    Motor: Arm left  Normal 5/5  Leg left  Normal 5/5  Arm right  Paresis: 4/5  Leg right Normal 5/5  Cebellar: No evidence of appendicular or axial ataxia  Sensation: Intact to light touch, temperature and vibration  Tone: Normal tone throughout    Laboratory:  CMP:   Recent Labs   Lab 08/02/19  0518   CALCIUM 9.1   ALBUMIN 3.7   PROT 7.1      K 3.7   CO2 25      BUN 10   CREATININE 0.8   ALKPHOS 91   ALT 17   AST 15   BILITOT 0.3     BMP:   Recent Labs   Lab  08/02/19  0518      K 3.7      CO2 25   BUN 10   CREATININE 0.8   CALCIUM 9.1     CBC:   Recent Labs   Lab 08/02/19  0518   WBC 6.12   RBC 4.51   HGB 13.1   HCT 41.1      MCV 91   MCH 29.0   MCHC 31.9*     Lipid Panel:   Recent Labs   Lab 07/28/19  0555   CHOL 135   LDLCALC 74.6   HDL 45   TRIG 77     Coagulation:   Recent Labs   Lab 08/02/19  0518   INR 0.9   APTT <21.0     Platelet Aggregation Study: No results for input(s): PLTAGG, PLTAGINTERP, PLTAGREGLACO, ADPPLTAGGREG in the last 168 hours.  Hgb A1C:   Recent Labs   Lab 07/27/19  1226   HGBA1C 5.5       Diagnostic Results     Brain Imaging   07/27/19 MRI Brain w/ w/o contrast:  1. Intermediate restricted diffusion signal in the left basal ganglia with faint enhancement and low associated ADC signal is most consistent with a subacute infarct     07/28/19 MRI C, T spine demyelinating w/ w/o contrast:  1.  There is a nonspecific, nonenhancing faint high STIR signal focus in the left paramedian ventral aspect of the medulla. This could reflect a demyelinating plaque or an acute lacunar infarction.  2. No cord signal abnormality in the thoracic spine.  3. There is a focal left subarticular disc protrusion causing mild narrowing of the left lateral recess of the canal.     07/31/19 MRI Brain w/ w/o contrast:  1. Likely old left posterior putaminal infarct with encephalomalacia.  Subacute infarct of the caudate head and anterior and extreme posterior left putamen.  2. Non-specific slight increased tonsillar ectopia with mild crowding at the foramen magnum compared to study from 07/27/2019 preserved rounding of the inferior tonsillar tips.  3. Stable subtle nonspecific sulcal FLAIR signal abnormality in the occipital lobes bilaterally.  Vessel Imaging   07/27/19 CTA head and neck:  1.  There is marked narrowing of the distal portion of the M1 segment of the left middle cerebral artery (see for example sequence 13, image 46) and no visible perfusion  of the M2 and M3 branches of the left middle cerebral artery.  2. The left anterior cerebral artery is perfused by the right internal carotid artery via the anterior communicating artery.  This is likely a normal variant.  3. There is no other cervical carotid or intracranial vascular abnormality.  Cardiac Imaging   07/29/19 TTE with CFD, Shunt Study:  · Normal left ventricular systolic function. The estimated ejection fraction is 55%  · Normal LV diastolic function.  · Normal right ventricular systolic function.  · The estimated PA systolic pressure is 17 mm Hg  · Study is negative for shunt      Sky Gonzalez MD  Comprehensive Stroke Center  Department of Vascular Neurology   [facility]

## 2019-08-02 NOTE — ASSESSMENT & PLAN NOTE
38 yo previously healthy F with recent L basal ganglia stroke leading to dysarthria and R-sided weakness as well as several weeks of intermittent symptoms of confusion, LLE spasticity, and emotional lability following initial stroke.  Work up notable for vessel imaging initially concerning for vasculitis, later with radiology noting Moyamoya pathology.  Transferred to [facility] for IR angiogram to evaluate. IR angiogram performed today.    Antithrombotics for secondary stroke prevention:  Start ASA, clopidogrel after angiogram  Statins for secondary stroke prevention/HLD: Atorvastatin 40 mg qd  Aggressive risk factor modification: Diet, Exercise  Rehab efforts: PT/OT/SLP  Diagnostics ordered/pending: Other: IR cerebral angiogram  VTE prophylaxis: Heparin 5000 units SQ every 8 hours  BP parameters: Infarct: No intervention, SBP <220

## 2019-08-02 NOTE — PT/OT/SLP EVAL
"Occupational Therapy   Evaluation    Name: Ally Ponce  MRN: [id number]  Admitting Diagnosis:  Left basal gangla stroke      Recommendations:     Discharge Recommendations: rehabilitation facility  Discharge Equipment Recommendations:  shower chair  Barriers to discharge:  None    Assessment:     Ally Ponce is a 39 y.o. female with a medical diagnosis of left basal ganglia stroke.  She presents with performance deficits affecting function: weakness, impaired self care skills, impaired balance, decreased coordination, decreased safety awareness, impaired endurance, impaired functional mobilty, decreased upper extremity function, gait instability, impaired cognition, decreased lower extremity function, impaired fine motor, impaired coordination, decreased ROM.      Rehab Prognosis: Good; patient would benefit from acute skilled OT services to address these deficits and reach maximum level of function.       Plan:     Patient to be seen 4 x/week to address the above listed problems via self-care/home management, neuromuscular re-education, therapeutic activities, therapeutic exercises, cognitive retraining  · Plan of Care Expires: 08/05/19  · Plan of Care Reviewed with: patient    Subjective     Patient:  "I want to go to rehab at [facility]."  "Writing is giving me the most trouble and my speech; it comes and goes."  "I have a 14 year old with down's syndrome and I have a 9 year old; I miss them."  Niece:  "She is very wobbly."  "When she gets nervous, she can't speak."     Occupational Profile:  Per patient and niece:  Prior to June, patient was living alone; independent with ADLs including driving; doing yardwork for her parents.  Since June, patient has been residing with 3 sisters in [facility] with 2 steps to enter, rail on the left.  Patient has required CGA with ambulation and ADLs; using a borrowed rollator.  Hobbies:  playing on her phone and watching TV.  Roles/Responsibilities:  Daughter, sister, aunt, mother, " cleaning the hous.      Pain/Comfort:  · Pain Rating 1: 0/10  · Pain Rating Post-Intervention 1: 0/10    Patients cultural, spiritual, Gnosticist conflicts given the current situation: no    Objective:     Communicated with: Nurse prior to session.  Patient found supine with peripheral IV, telemetry upon OT entry to room.  Niece present.  General Precautions: Standard, fall   Orthopedic Precautions:N/A   Braces: N/A     Occupational Performance:    Bed Mobility:    · Patient completed Rolling/Turning to Left with  modified independence  · Patient completed Rolling/Turning to Right with modified independence  · Patient completed Scooting/Bridging with modified independence  · Patient completed Supine to Sit with modified independence  · Patient completed Sit to Supine with modified independence    Functional Mobility/Transfers:  · Patient completed Sit <> Stand Transfer with contact guard assistance  with  no assistive device   · Patient completed Bed <> Chair Transfer using Stand Pivot technique with contact guard assistance with no assistive device    Activities of Daily Living:  · Grooming: contact guard assistance while standing  · Upper Body Dressing: minimum assistance with assistance for fasteners  · Lower Body Dressing: minimum assistance      Cognitive/Visual Perceptual:  Cognitive/Psychosocial Skills:     -       Oriented to: Person, Place, Time and Situation   -       Follows Commands/attention:Follows one-step commands  -       Communication: dysarthria  -       Safety awareness/insight to disability: impaired   -       Mood/Affect/Coping skills/emotional control: Appropriate to situation and Cooperative    Physical Exam:  Postural examination/scapula alignment:    -       Rounded shoulders  Skin integrity: Visible skin intact  Edema:  None noted  Sensation:    -       Intact  Upper Extremity Range of Motion:     -       Right Upper Extremity: AAROM WNL  -       Left Upper Extremity: WNL  Upper Extremity  Strength:    -       Right Upper Extremity: 4/5 proximally; 2/5 wrist and fingers  -       Left Upper Extremity: WNL    AMPAC 6 Click ADL:  AMPAC Total Score: 24    Treatment & Education:  Patient/ Family education provided for stroke warning signs, prevention guidelines and personal risk factors.  Patient/ Family verbalizing understanding via teach back method.  Patient education provided on role of OT and need for rehab upon discharge.  Patient education provided on hemiplegic dressing technique, right UE weight bearing / positioning, postural control, and transfers.   Patient and family instructed on need to call for assistance for toileting needs and when getting up.  Continued education, patient/ family training recommended.  Addressed MCP mobilizations, right hand.  Addressed right UE weight bearing.  Patient's functional status and disposition recommendation discussed with stroke team in daily rounds.  White board updated in patient's room.  OT asked if there were any other questions; patient/ family had no further questions.   Education:    Patient left supine with all lines intact and call button in reach    GOALS:   Multidisciplinary Problems     Occupational Therapy Goals        Problem: Occupational Therapy Goal    Goal Priority Disciplines Outcome Interventions   Occupational Therapy Goal     OT, PT/OT     Description:  Goals set 8/2 to be addressed for 7 days with expiration date, 8/9:  Patient will increase functional independence with ADLs by performing:    Patient will demonstrate stand pivot transfers with modified independence.   Not met  Patient will demonstrate grooming while standing with modified independence.   Not met  Patient will demonstrate upper body dressing with modified independence while seated EOB.   Not met  Patient will demonstrate lower body dressing with modified independence while seated EOB.   Not met  Patient will demonstrate toileting with modified independence.   Not  met  Patient will demonstrate bathing while seated EOB with modified independence.   Not met  Patient will demonstrate independence with HEP for right UE weight bearing.    Not met  Patient's family / caregiver will demonstrate independence and safety with assisting patient with self-care skills and functional mobility.     Not met  Patient and/or patient's family will verbalize understanding of stroke prevention guidelines, personal risk factors and stroke warning signs via teachback method.  Not met                           History:     Past Medical History:   Diagnosis Date    High cholesterol        Past Surgical History:   Procedure Laterality Date    TUBAL LIGATION  2005       Time Tracking:     OT Date of Treatment: 08/02/19  OT Start Time: 0518  OT Stop Time: 0554  OT Total Time (min): 36 min    Billable Minutes:Evaluation 13  Therapeutic Activity 10  Neuromuscular Re-education 13    ELIJAH Monet  8/2/2019

## 2019-08-02 NOTE — PLAN OF CARE
PCP: Mariana Mendoza NP  Pharmacy:   [facility] [place] - [address]  [address] [place] [address]  Phone: [phone] Fax: [phone]         08/02/19 1130   Discharge Assessment   Assessment Type Discharge Planning Assessment   Confirmed/corrected address and phone number on facesheet? Yes   Assessment information obtained from? Patient   Expected Length of Stay (days)   (TBD)   Communicated expected length of stay with patient/caregiver yes   Prior to hospitilization cognitive status: Alert/Oriented;No Deficits   Prior to hospitalization functional status: Independent   Current cognitive status: Alert/Oriented;No Deficits   Current Functional Status: Independent   Lives With sibling(s)   Able to Return to Prior Arrangements no   Is patient able to care for self after discharge? Unable to determine at this time (comments)   Who are your caregiver(s) and their phone number(s)? Jeny Bales     [phone]    Patient's perception of discharge disposition rehab facility   Readmission Within the Last 30 Days no previous admission in last 30 days   Patient currently being followed by outpatient case management? No   Patient currently receives any other outside agency services? No   Equipment Currently Used at Home rollator   Do you have any problems affording any of your prescribed medications? No   Is the patient taking medications as prescribed? yes   Does the patient have transportation home? Yes   Transportation Anticipated family or friend will provide   Does the patient receive services at the [facility]madin Clinic? No   Discharge Plan A Rehab   Discharge Plan B Rehab   DME Needed Upon Discharge  none   Patient/Family in Agreement with Plan yes

## 2019-08-02 NOTE — PLAN OF CARE
Problem: SLP Goal  Goal: SLP Goal  Speech-Language Pathology Goals  Goals to be met by 8/9/19  1. Patient will tolerate a REGULAR DIET/THIN LIQUIDS with no s/s of aspiration.   2. Patient will participate in ongoing assessment of high level cognitive-linguistic skills.   3. Patient will independently recall and utilize three clear speech strategies during session.  4. Patient will complete dysarthria tasks x10 per session with min assist.   Patient seen for a bedside swallow eval and a speech/language/cognitive assessment. SLP recommending a REGULAR DIET/THIN LIQUIDS.    Gage Pierre CCC-SLP  Speech-Language Pathology  Pager: [phone]

## 2019-08-02 NOTE — PLAN OF CARE
08/02/19 1613   Post-Acute Status   Post-Acute Authorization Placement   Post-Acute Placement Status Referrals Sent       Referral sent to [facility].  This was the only choice patient and family gave. Will continue to work on getting more choices from the patient and family.  SW in contact with CM and Medical staff. Will continue to follow and offer support as needed.     Del Mackay, DANI  [facility]   Ext. [phone]

## 2019-08-02 NOTE — PROCEDURES
Radiology Post-Procedure Note    Pre Op Diagnosis: left MCA disease    Post Op Diagnosis: same    Procedure: Cerebral angiogram    Procedure performed by: Aashish Duarte MD    Written Informed Consent Obtained: Yes    Specimen Removed: NO    Estimated Blood Loss: less than 50     Procedure report:     A 5F sheath was placed into the right femoral artery and a 5F James catheter was advanced into the aortic arch.  The bilateral carotid and vertebral arteries were subselected and angiography of the brain was performed after injection into each of these vessels.    Preliminary interpretation: tapered stenosis to occlusion of the left MCA.  Please see Imaging report for full details.    A right femoral artery angiogram was performed, the sheath removed and hemostasis achieved using Exoseal.  No hematoma was present at the time of hemostasis.    The patient tolerated the procedure well.     Aashish Duarte MD  Attending Radiologist  Interventional Neuroradiology

## 2019-08-02 NOTE — SUBJECTIVE & OBJECTIVE
Neurologic Chief Complaint: L basal ganglia stroke    Subjective:     Interval History: Patient is seen for follow-up neurological assessment and treatment recommendations: Patient improving today. Received angiogram. Will follow up on results.    HPI, Past Medical, Family, and Social History remains the same as documented in the initial encounter.     Review of Systems   Constitutional: Positive for fatigue.   HENT: Negative for facial swelling.    Eyes: Negative for visual disturbance.   Respiratory: Negative for chest tightness.    Cardiovascular: Negative for chest pain.   Gastrointestinal: Negative for abdominal pain.   Genitourinary: Negative for pelvic pain.   Musculoskeletal: Positive for neck pain.   Neurological: Positive for speech difficulty, weakness and numbness.   Psychiatric/Behavioral: Negative for agitation.     Scheduled Meds:   aspirin  325 mg Oral Daily    atorvastatin  80 mg Oral Daily    clopidogrel  75 mg Oral Daily     Continuous Infusions:  PRN Meds:acetaminophen, baclofen, lidocaine, ondansetron, ramelteon, sodium chloride 0.9%    Objective:     Vital Signs (Most Recent):  Temp: 97.3 °F (36.3 °C) (08/02/19 1558)  Pulse: 86 (08/02/19 1558)  Resp: 17 (08/02/19 1558)  BP: 106/66 (08/02/19 1558)  SpO2: 96 % (08/02/19 1558)  BP Location: Right arm    Vital Signs Range (Last 24H):  Temp:  [96.2 °F (35.7 °C)-97.9 °F (36.6 °C)]   Pulse:  [65-90]   Resp:  [16-18]   BP: (101-122)/(50-82)   SpO2:  [96 %-100 %]   BP Location: Right arm    Physical Exam   Constitutional: She is oriented to person, place, and time. She appears well-developed and well-nourished.   HENT:   Head: Normocephalic and atraumatic.   Eyes: EOM are normal.   Cardiovascular: Normal rate.   Pulmonary/Chest: Effort normal.   Abdominal: There is no tenderness.   Neurological: She is alert and oriented to person, place, and time.   Psychiatric: She has a normal mood and affect.       Neurological Exam:   LOC: alert  Attention Span:  Good   Language: No aphasia, Naming impaired  Articulation: Dysarthria  Orientation: Person, Place, Time   Visual Fields: Full  EOM (CN III, IV, VI): Full/intact  Pupils (CN II, III): PERRL  Facial Sensation (CN V): Normal  Facial Movement (CN VII): Symmetric facial expression    Motor: Arm left  Normal 5/5  Leg left  Normal 5/5  Arm right  Paresis: 4/5  Leg right Normal 5/5  Cebellar: No evidence of appendicular or axial ataxia  Sensation: Intact to light touch, temperature and vibration  Tone: Normal tone throughout    Laboratory:  CMP:   Recent Labs   Lab 08/02/19  0518   CALCIUM 9.1   ALBUMIN 3.7   PROT 7.1      K 3.7   CO2 25      BUN 10   CREATININE 0.8   ALKPHOS 91   ALT 17   AST 15   BILITOT 0.3     BMP:   Recent Labs   Lab 08/02/19  0518      K 3.7      CO2 25   BUN 10   CREATININE 0.8   CALCIUM 9.1     CBC:   Recent Labs   Lab 08/02/19 0518   WBC 6.12   RBC 4.51   HGB 13.1   HCT 41.1      MCV 91   MCH 29.0   MCHC 31.9*     Lipid Panel:   Recent Labs   Lab 07/28/19  0555   CHOL 135   LDLCALC 74.6   HDL 45   TRIG 77     Coagulation:   Recent Labs   Lab 08/02/19  0518   INR 0.9   APTT <21.0     Platelet Aggregation Study: No results for input(s): PLTAGG, PLTAGINTERP, PLTAGREGLACO, ADPPLTAGGREG in the last 168 hours.  Hgb A1C:   Recent Labs   Lab 07/27/19  1226   HGBA1C 5.5       Diagnostic Results     Brain Imaging   07/27/19 MRI Brain w/ w/o contrast:  1. Intermediate restricted diffusion signal in the left basal ganglia with faint enhancement and low associated ADC signal is most consistent with a subacute infarct     07/28/19 MRI C, T spine demyelinating w/ w/o contrast:  1.  There is a nonspecific, nonenhancing faint high STIR signal focus in the left paramedian ventral aspect of the medulla. This could reflect a demyelinating plaque or an acute lacunar infarction.  2. No cord signal abnormality in the thoracic spine.  3. There is a focal left subarticular disc protrusion  causing mild narrowing of the left lateral recess of the canal.     07/31/19 MRI Brain w/ w/o contrast:  1. Likely old left posterior putaminal infarct with encephalomalacia.  Subacute infarct of the caudate head and anterior and extreme posterior left putamen.  2. Non-specific slight increased tonsillar ectopia with mild crowding at the foramen magnum compared to study from 07/27/2019 preserved rounding of the inferior tonsillar tips.  3. Stable subtle nonspecific sulcal FLAIR signal abnormality in the occipital lobes bilaterally.  Vessel Imaging   07/27/19 CTA head and neck:  1.  There is marked narrowing of the distal portion of the M1 segment of the left middle cerebral artery (see for example sequence 13, image 46) and no visible perfusion of the M2 and M3 branches of the left middle cerebral artery.  2. The left anterior cerebral artery is perfused by the right internal carotid artery via the anterior communicating artery.  This is likely a normal variant.  3. There is no other cervical carotid or intracranial vascular abnormality.  Cardiac Imaging   07/29/19 TTE with CFD, Shunt Study:  · Normal left ventricular systolic function. The estimated ejection fraction is 55%  · Normal LV diastolic function.  · Normal right ventricular systolic function.  · The estimated PA systolic pressure is 17 mm Hg  · Study is negative for shunt

## 2019-08-02 NOTE — PLAN OF CARE
Problem: Adult Inpatient Plan of Care  Goal: Plan of Care Review  Outcome: Ongoing (interventions implemented as appropriate)     08/02/19 0648   Plan of Care Review   Plan of Care Reviewed With patient;sibling   Patient AAO*4. Slight right facial droop. Denies pain. Ambulates with assist.Aware of NPO after midnight. Sister at bedside.

## 2019-08-02 NOTE — PLAN OF CARE
Problem: Physical Therapy Goal  Goal: Physical Therapy Goal  Goals to be met by: 19      Patient will increase functional independence with mobility by performin. Supine to sit with [place]  2. Sit to supine with [place]  3. Gait  x 150 feet with Modified [place] using LRD, if needed  4. Ascend/descend 2 stairs with right Handrails Stand-by Assistance     Outcome: Ongoing (interventions implemented as appropriate)  Initial evaluation completed. Patient tolerated assessment well. Established POC and goals. Patient would continue to benefit from skilled PT services in order to improve functional mobility independence.       Laurita Morel, PT, DPT  2019

## 2019-08-02 NOTE — PROGRESS NOTES
Cerebral angiogram complete. Right groin site CDI.  VSS.Hemostasis achieved with use of 5 [place] EXOSEAL  closure device. HOB to be elevated at 1325.  Report called to . ROCU census full . Recovery began in procedure room.

## 2019-08-02 NOTE — PT/OT/SLP EVAL
Speech Language Pathology Evaluation  Cognitive/Bedside Swallow    Patient Name:  Ally Ponce   MRN:  [id number]  Admitting Diagnosis: <principal problem not specified>    Recommendations:                  General Recommendations:  Speech/language therapy  Diet recommendations:  Regular, Thin   Aspiration Precautions: Standard aspiration precautions   General Precautions: Standard, fall  Communication strategies:  none    History:     Past Medical History:   Diagnosis Date    High cholesterol        Past Surgical History:   Procedure Laterality Date    TUBAL LIGATION  2005       Social History: Patient lived with her two daughters (14 and 9) in [place] prior to stroke.    Prior Intubation HX:  None this admission    Modified Barium Swallow: None on file    Chest X-Rays: None on file    Prior diet: regular/thin.    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 Enjoys playing on her phone and watching TV.    Subjective     Patient awake and alert during session  Communicated with nurse prior to session     Pain/Comfort:  · Pain Rating 1: 0/10  · Pain Rating Post-Intervention 1: 0/10    Objective:     Cognitive Status:    Arousal/Alertness Appropriate response to stimuli  Attention WFL  Orientation Oriented x4  Memory Immediate Recall WFL, Delayed Recall WFL and recall general information WFL  Simple calculation WFL      Receptive Language:   Comprehension:   **Further assessment warranted  Questions Complex yes/no WFL  Open ended questions WFL  Commands  multistep basic commands WFL  Conversation TBA    Pragmatics:    WFL    Expressive Language:  Verbal:    Naming Confrontation WFL, Divergent responsive WFL and Single word responsive naming WFL  Sentence formulation WFL      Motor Speech:  Presents with significantly increased ROS accompanied by imprecise articulation. Patient with limited awareness of speech patterns and requires consistent cueing for clear speech.      Voice:   WFL    Visual-Spatial:  WFL    Reading:   TBA     Written Expression:   Patient reported reduced writing ability 2' to decreased dexterity of RUE    Oral Musculature Evaluation  · Oral Musculature: WFL  · Dentition: present and adequate  · Secretion Management: adequate  · Mucosal Quality: good  · Mandibular Strength and Mobility: WFL  · Oral Labial Strength and Mobility: WFL  · Lingual Strength and Mobility: WFL  · Buccal Strength and Mobility: WFL  · Volitional Cough: adequate  · Volitional Swallow: timely; good laryngeal elevation  · Voice Prior to PO Intake: WFL    Bedside Swallow Eval:   Consistencies Assessed:  · Thin liquids x6 (via straw)  · Solids x4 (luis crackers)     Oral Phase:   · WFL    Pharyngeal Phase:   · no overt clinical signs/symptoms of aspiration  · no overt clinical signs/symptoms of pharyngeal dysphagia    Compensatory Strategies  · None    Treatment: Patient seen for a bedside swallow eval and a speech/language/cognitive assessment. She was sitting up on sofa with her sister in the room. Both patient and sister reported that only significant deficit from a ST perspective was her speech. SLP educated patient regarding clear speech strategies and she verbalized understanding. Whiteboard updated. Patient left in room with call light within reach. Diet recs communicated to treatment team.      Assessment:     Ally Ponce is a 39 y.o. female with an SLP diagnosis of Dysarthria.      Goals:   Multidisciplinary Problems     SLP Goals        Problem: SLP Goal    Goal Priority Disciplines Outcome   SLP Goal     SLP    Description:  Speech-Language Pathology Goals  Goals to be met by 8/9/19  1. Patient will tolerate a REGULAR DIET/THIN LIQUIDS with no s/s of aspiration.   2. Patient will participate in ongoing assessment of high level cognitive-linguistic skills.   3. Patient will independently recall and utilize three clear speech strategies during session.  4. Patient will complete  dysarthria tasks x10 per session with min assist.                     Plan:     · Patient to be seen:  3 x/week   · Plan of Care expires:  09/01/19  · Plan of Care reviewed with:  patient, sibling   · SLP Follow-Up:  Yes       Discharge recommendations:  Discharge Facility/Level of Care Needs: rehabilitation facility   Barriers to Discharge:  None    Time Tracking:     SLP Treatment Date:   08/02/19  Speech Start Time:  1359  Speech Stop Time:  1416     Speech Total Time (min):  17 min    Billable Minutes: Eval 9  and Eval Swallow and Oral Function 8    Gage Pierre CCC-SLP  Speech-Language Pathology  Pager: [phone]   08/02/2019

## 2019-08-02 NOTE — PLAN OF CARE
Problem: Occupational Therapy Goal  Goal: Occupational Therapy Goal  OT evaluation completed.  ELIJAH Monet  8/2/2019

## 2019-08-02 NOTE — PT/OT/SLP EVAL
"Physical Therapy Evaluation    Patient Name:  Ally Ponce   MRN:  [id number]    Recommendations:     Discharge Recommendations:  rehabilitation facility   Discharge Equipment Recommendations: none   Barriers to discharge: Inaccessible home and Decreased caregiver support    Assessment:     Ally Ponce is a 39 y.o. female admitted with a medical diagnosis of <principal problem not specified>.  She presents with the following impairments/functional limitations:  weakness, impaired functional mobilty, decreased safety awareness, impaired balance, impaired endurance, impaired self care skills, gait instability, decreased coordination, decreased lower extremity function, impaired fine motor, impaired sensation. Patient tolerated assessment well and would benefit from skilled PT services during her LOS. At this time, upon D/C patient is a good candidate for inpatient rehabilitation  in order to address the deficits mentioned above.     Rehab Prognosis: Good; patient would benefit from acute skilled PT services to address these deficits and reach maximum level of function.    Recent Surgery: * No surgery found *      Plan:     During this hospitalization, patient to be seen 4 x/week to address the identified rehab impairments via gait training, neuromuscular re-education, therapeutic activities, therapeutic exercises and progress toward the following goals:    · Plan of Care Expires:  09/01/19    Subjective     Chief Complaint: R groin pain   Patient/Family Comments/goals: "I'm just so hungry, they are waiting to approve me for a meal."  Pain/Comfort:  · Pain Rating 1: 5/10  · Location - Side 1: Right  · Location - Orientation 1: upper  · Location 1: groin(incisional)  · Pain Addressed 1: Reposition, Distraction  · Pain Rating Post-Intervention 1: 5/10    Patients cultural, spiritual, Catholic conflicts given the current situation: no    Living Environment:  Patient lives in trailer with her sister and kids with 2 JESSE and " MARIUM booth. PTA she ambulated with a rollator and was I in all ADLs. No longer working or driving.   Prior to admission, patients level of function was [place].  Equipment used at home: rollator(borrowed rollator).  DME owned (not currently used): none.  Upon discharge, patient will have assistance from family.    Objective:     Communicated with RN prior to session.  Patient found sitting on bedside couch with sister with telemetry  upon PT entry to room.    General Precautions: Standard, fall   Orthopedic Precautions:N/A   Braces: N/A     Exams:  · Cognitive Exam:  Patient is oriented to Person, Place, Time and Situation  · Fine Motor Coordination:    · -       Intact  Left hand thumb/finger opposition skills, Right hand thumb/finger opposition skills and Rapid alternating ankle DF/PF  · Postural Exam:  Patient presented with the following abnormalities:    · -       No postural abnormalities identified  · Sensation:    · -       Intact  light/touch BLE  · RLE ROM: WFL  · RLE Strength: WFL  · LLE ROM: WFL  · LLE Strength: WFL    Functional Mobility:  · Transfers:     · Sit to Stand:  stand by assistance with no AD  · Gait: 2x20ft with CGA-SBA; no overt LOS; slow gait pattern; R groin pain in walking 2* angio site  · Balance: static and dynamic standing - CGA      Therapeutic Activities and Exercises:   Patient and sister educated on role and goal of PT   White board updated   Questions and concerns answered within PT scope     AM-PAC 6 CLICK MOBILITY  Total Score:21     Patient left up in chair with all lines intact, call button in reach and family present.    GOALS:   Multidisciplinary Problems     Physical Therapy Goals        Problem: Physical Therapy Goal    Goal Priority Disciplines Outcome Goal Variances Interventions   Physical Therapy Goal     PT, PT/OT Ongoing (interventions implemented as appropriate)     Description:  Goals to be met by: 8/12/19      Patient will increase functional independence with mobility  by performin. Supine to sit with [place]  2. Sit to supine with [place]  3. Gait  x 150 feet with Modified [place] using LRD, if needed  4. Ascend/descend 2 stairs with right Handrails Stand-by Assistance                       History:     Past Medical History:   Diagnosis Date    High cholesterol        Past Surgical History:   Procedure Laterality Date    TUBAL LIGATION         Time Tracking:     PT Received On: 19  PT Start Time: 1439     PT Stop Time: 1451  PT Total Time (min): 12 min     Billable Minutes: Evaluation 12 min       Laurita Morel, PT  2019

## 2019-08-02 NOTE — H&P
Inpatient Radiology Pre-procedure Note    History of Present Illness:  Ally Ponce is a 39 y.o. female who presents for cerebral angiogram for suspected vasculitis.    Admission H&P reviewed.  Past Medical History:   Diagnosis Date    High cholesterol      Past Surgical History:   Procedure Laterality Date    TUBAL LIGATION  2005       Review of Systems:   As documented in primary team H&P    Home Meds:   Prior to Admission medications    Medication Sig Start Date End Date Taking? Authorizing Provider   aspirin (ECOTRIN) 325 MG EC tablet Take 325 mg by mouth once daily.    Historical Provider, MD   atorvastatin (LIPITOR) 40 MG tablet Take 40 mg by mouth every evening.    Historical Provider, MD   baclofen (LIORESAL) 10 MG tablet Take 5 mg by mouth 2 (two) times daily.    Historical Provider, MD   butalbital-acetaminophen-caffeine -40 mg (FIORICET, ESGIC) -40 mg per tablet Take 1 tablet by mouth every 4 (four) hours as needed for Pain.    Historical Provider, MD   clopidogrel (PLAVIX) 75 mg tablet Take 75 mg by mouth once daily.    Historical Provider, MD   cyanocobalamin, vitamin B-12, (VITAMIN B-12) 50 mcg tablet Take 50 mcg by mouth once daily.    Historical Provider, MD   montelukast (SINGULAIR) 10 mg tablet Take 10 mg by mouth every evening.    Historical Provider, MD   potassium gluconate 595 mg (99 mg) Tab Take by mouth once.    Historical Provider, MD     Scheduled Meds:    aspirin  325 mg Oral Daily    atorvastatin  80 mg Oral Daily    clopidogrel  75 mg Oral Daily     Continuous Infusions:   PRN Meds:acetaminophen, baclofen, lidocaine, ondansetron, ramelteon, sodium chloride 0.9%  Anticoagulants/Antiplatelets: aspirin, Plavix and Heparin    Allergies: Review of patient's allergies indicates:  No Known Allergies  Sedation Hx: have not been any systemic reactions    Labs:  Recent Labs   Lab 08/02/19 0518   INR 0.9       Recent Labs   Lab 08/02/19 0518   WBC 6.12   HGB 13.1   HCT 41.1   MCV  91         Recent Labs   Lab 08/02/19 0518         K 3.7      CO2 25   BUN 10   CREATININE 0.8   CALCIUM 9.1   MG 2.0   ALT 17   AST 15   ALBUMIN 3.7   BILITOT 0.3         Vitals:  Temp: 97 °F (36.1 °C) (08/02/19 0733)  Pulse: 81 (08/02/19 0733)  Resp: 16 (08/02/19 0733)  BP: 120/78 (08/02/19 0733)  SpO2: 97 % (08/02/19 0733)     Physical Exam:  ASA: 3  Mallampati: 2    General: no acute distress  Mental Status: alert and oriented to person, place and time  HEENT: normocephalic, atraumatic  Chest: unlabored breathing  Heart: regular heart rate  Abdomen: nondistended  Extremity: moves all extremities    Plan: Cerebral angiogram  Sedation Plan: up to moderate     [place] Alas  Diagnostic and interventional radiology  PGY- II  [phone]

## 2019-08-03 PROBLEM — G93.6 CYTOTOXIC CEREBRAL EDEMA: Status: ACTIVE | Noted: 2019-08-03

## 2019-08-03 PROBLEM — I63.312 THROMBOTIC STROKE INVOLVING LEFT MIDDLE CEREBRAL ARTERY: Status: ACTIVE | Noted: 2019-07-27

## 2019-08-03 PROBLEM — F32.9 REACTIVE DEPRESSION: Status: ACTIVE | Noted: 2019-08-03

## 2019-08-03 PROBLEM — R20.0 RIGHT SIDED NUMBNESS: Status: RESOLVED | Noted: 2019-07-27 | Resolved: 2019-08-03

## 2019-08-03 PROBLEM — R51.9 HEADACHE: Status: RESOLVED | Noted: 2019-07-28 | Resolved: 2019-08-03

## 2019-08-03 LAB
ALBUMIN SERPL BCP-MCNC: 3.6 G/DL (ref 3.5–5.2)
ALP SERPL-CCNC: 93 U/L (ref 55–135)
ALT SERPL W/O P-5'-P-CCNC: 16 U/L (ref 10–44)
ANION GAP SERPL CALC-SCNC: 11 MMOL/L (ref 8–16)
AST SERPL-CCNC: 13 U/L (ref 10–40)
BASOPHILS # BLD AUTO: 0.05 K/UL (ref 0–0.2)
BASOPHILS # BLD AUTO: 0.06 K/UL (ref 0–0.2)
BASOPHILS NFR BLD: 0.7 % (ref 0–1.9)
BASOPHILS NFR BLD: 0.8 % (ref 0–1.9)
BILIRUB SERPL-MCNC: 0.3 MG/DL (ref 0.1–1)
BUN SERPL-MCNC: 11 MG/DL (ref 6–20)
CALCIUM SERPL-MCNC: 8.9 MG/DL (ref 8.7–10.5)
CHLORIDE SERPL-SCNC: 104 MMOL/L (ref 95–110)
CO2 SERPL-SCNC: 24 MMOL/L (ref 23–29)
CREAT SERPL-MCNC: 0.8 MG/DL (ref 0.5–1.4)
DIFFERENTIAL METHOD: ABNORMAL
DIFFERENTIAL METHOD: NORMAL
EOSINOPHIL # BLD AUTO: 0.3 K/UL (ref 0–0.5)
EOSINOPHIL # BLD AUTO: 0.3 K/UL (ref 0–0.5)
EOSINOPHIL NFR BLD: 3.9 % (ref 0–8)
EOSINOPHIL NFR BLD: 4.9 % (ref 0–8)
ERYTHROCYTE [DISTWIDTH] IN BLOOD BY AUTOMATED COUNT: 14.1 % (ref 11.5–14.5)
ERYTHROCYTE [DISTWIDTH] IN BLOOD BY AUTOMATED COUNT: 14.3 % (ref 11.5–14.5)
EST. GFR  (AFRICAN AMERICAN): >60 ML/MIN/1.73 M^2
EST. GFR  (NON AFRICAN AMERICAN): >60 ML/MIN/1.73 M^2
GLUCOSE SERPL-MCNC: 95 MG/DL (ref 70–110)
HCT VFR BLD AUTO: 38.7 % (ref 37–48.5)
HCT VFR BLD AUTO: 41.8 % (ref 37–48.5)
HGB BLD-MCNC: 12.4 G/DL (ref 12–16)
HGB BLD-MCNC: 13 G/DL (ref 12–16)
HIV1+2 IGG SERPL QL IA.RAPID: NEGATIVE
IMM GRANULOCYTES # BLD AUTO: 0.01 K/UL (ref 0–0.04)
IMM GRANULOCYTES # BLD AUTO: 0.01 K/UL (ref 0–0.04)
IMM GRANULOCYTES NFR BLD AUTO: 0.1 % (ref 0–0.5)
IMM GRANULOCYTES NFR BLD AUTO: 0.1 % (ref 0–0.5)
LYMPHOCYTES # BLD AUTO: 1.6 K/UL (ref 1–4.8)
LYMPHOCYTES # BLD AUTO: 2.1 K/UL (ref 1–4.8)
LYMPHOCYTES NFR BLD: 22.3 % (ref 18–48)
LYMPHOCYTES NFR BLD: 30.9 % (ref 18–48)
MCH RBC QN AUTO: 28.9 PG (ref 27–31)
MCH RBC QN AUTO: 29 PG (ref 27–31)
MCHC RBC AUTO-ENTMCNC: 31.1 G/DL (ref 32–36)
MCHC RBC AUTO-ENTMCNC: 32 G/DL (ref 32–36)
MCV RBC AUTO: 91 FL (ref 82–98)
MCV RBC AUTO: 93 FL (ref 82–98)
MONOCYTES # BLD AUTO: 0.6 K/UL (ref 0.3–1)
MONOCYTES # BLD AUTO: 0.7 K/UL (ref 0.3–1)
MONOCYTES NFR BLD: 8.7 % (ref 4–15)
MONOCYTES NFR BLD: 9.7 % (ref 4–15)
NEUTROPHILS # BLD AUTO: 3.7 K/UL (ref 1.8–7.7)
NEUTROPHILS # BLD AUTO: 4.5 K/UL (ref 1.8–7.7)
NEUTROPHILS NFR BLD: 54.7 % (ref 38–73)
NEUTROPHILS NFR BLD: 63.2 % (ref 38–73)
NRBC BLD-RTO: 0 /100 WBC
NRBC BLD-RTO: 0 /100 WBC
PLATELET # BLD AUTO: 245 K/UL (ref 150–350)
PLATELET # BLD AUTO: 272 K/UL (ref 150–350)
PMV BLD AUTO: 10.5 FL (ref 9.2–12.9)
PMV BLD AUTO: 11.2 FL (ref 9.2–12.9)
POTASSIUM SERPL-SCNC: 3.7 MMOL/L (ref 3.5–5.1)
PROT SERPL-MCNC: 6.9 G/DL (ref 6–8.4)
RBC # BLD AUTO: 4.27 M/UL (ref 4–5.4)
RBC # BLD AUTO: 4.5 M/UL (ref 4–5.4)
SODIUM SERPL-SCNC: 139 MMOL/L (ref 136–145)
WBC # BLD AUTO: 6.77 K/UL (ref 3.9–12.7)
WBC # BLD AUTO: 7.1 K/UL (ref 3.9–12.7)

## 2019-08-03 PROCEDURE — 86703 HIV-1/HIV-2 1 RESULT ANTBDY: CPT

## 2019-08-03 PROCEDURE — 99233 SBSQ HOSP IP/OBS HIGH 50: CPT | Mod: ,,, | Performed by: PSYCHIATRY & NEUROLOGY

## 2019-08-03 PROCEDURE — 85025 COMPLETE CBC W/AUTO DIFF WBC: CPT | Mod: 91

## 2019-08-03 PROCEDURE — 25000003 PHARM REV CODE 250: Performed by: STUDENT IN AN ORGANIZED HEALTH CARE EDUCATION/TRAINING PROGRAM

## 2019-08-03 PROCEDURE — 36415 COLL VENOUS BLD VENIPUNCTURE: CPT

## 2019-08-03 PROCEDURE — 85060 BLOOD SMEAR INTERPRETATION: CPT | Mod: ,,, | Performed by: PATHOLOGY

## 2019-08-03 PROCEDURE — 85060 PATHOLOGIST REVIEW: ICD-10-PCS | Mod: ,,, | Performed by: PATHOLOGY

## 2019-08-03 PROCEDURE — 99233 PR SUBSEQUENT HOSPITAL CARE,LEVL III: ICD-10-PCS | Mod: ,,, | Performed by: PSYCHIATRY & NEUROLOGY

## 2019-08-03 PROCEDURE — 80053 COMPREHEN METABOLIC PANEL: CPT

## 2019-08-03 PROCEDURE — 63600175 PHARM REV CODE 636 W HCPCS: Performed by: PHYSICIAN ASSISTANT

## 2019-08-03 PROCEDURE — 25000003 PHARM REV CODE 250: Performed by: PHYSICIAN ASSISTANT

## 2019-08-03 PROCEDURE — 20600001 HC STEP DOWN PRIVATE ROOM

## 2019-08-03 RX ORDER — FLUOXETINE HYDROCHLORIDE 20 MG/1
20 CAPSULE ORAL DAILY
Status: DISCONTINUED | OUTPATIENT
Start: 2019-08-03 | End: 2019-08-05

## 2019-08-03 RX ORDER — HEPARIN SODIUM 5000 [USP'U]/ML
5000 INJECTION, SOLUTION INTRAVENOUS; SUBCUTANEOUS EVERY 8 HOURS
Status: DISCONTINUED | OUTPATIENT
Start: 2019-08-03 | End: 2019-08-05 | Stop reason: HOSPADM

## 2019-08-03 RX ADMIN — ASPIRIN 325 MG: 325 TABLET, COATED ORAL at 08:08

## 2019-08-03 RX ADMIN — HEPARIN SODIUM 5000 UNITS: 5000 INJECTION, SOLUTION INTRAVENOUS; SUBCUTANEOUS at 02:08

## 2019-08-03 RX ADMIN — RAMELTEON 8 MG: 8 TABLET, FILM COATED ORAL at 09:08

## 2019-08-03 RX ADMIN — HEPARIN SODIUM 5000 UNITS: 5000 INJECTION, SOLUTION INTRAVENOUS; SUBCUTANEOUS at 09:08

## 2019-08-03 RX ADMIN — ATORVASTATIN CALCIUM 80 MG: 20 TABLET, FILM COATED ORAL at 08:08

## 2019-08-03 RX ADMIN — FLUOXETINE 20 MG: 20 CAPSULE ORAL at 02:08

## 2019-08-03 RX ADMIN — SODIUM CHLORIDE 1000 ML: 0.9 INJECTION, SOLUTION INTRAVENOUS at 12:08

## 2019-08-03 RX ADMIN — ONDANSETRON HYDROCHLORIDE 4 MG: 4 TABLET, FILM COATED ORAL at 08:08

## 2019-08-03 RX ADMIN — CLOPIDOGREL 75 MG: 75 TABLET, FILM COATED ORAL at 08:08

## 2019-08-03 NOTE — PLAN OF CARE
Problem: Adult Inpatient Plan of Care  Goal: Plan of Care Review  Outcome: Ongoing (interventions implemented as appropriate)  POC reviewed with patient this shift.  A/O x4.  Respirations unlabored.  Skin w/d.  Quiet uneventful shift.  Aside from request for PRN sleeping aid no other requests/complaints voiced.  Ambulates with x1 assist to bathroom.  Tolerates meds whole with water without difficulty.  Able to verbalize wants/needs.  No c/o pain or discomfort.

## 2019-08-04 PROBLEM — R11.0 NAUSEA: Status: ACTIVE | Noted: 2019-08-04

## 2019-08-04 PROCEDURE — 25000003 PHARM REV CODE 250: Performed by: STUDENT IN AN ORGANIZED HEALTH CARE EDUCATION/TRAINING PROGRAM

## 2019-08-04 PROCEDURE — 25000003 PHARM REV CODE 250: Performed by: PHYSICIAN ASSISTANT

## 2019-08-04 PROCEDURE — 63600175 PHARM REV CODE 636 W HCPCS: Performed by: PHYSICIAN ASSISTANT

## 2019-08-04 PROCEDURE — 99233 PR SUBSEQUENT HOSPITAL CARE,LEVL III: ICD-10-PCS | Mod: ,,, | Performed by: PSYCHIATRY & NEUROLOGY

## 2019-08-04 PROCEDURE — 20600001 HC STEP DOWN PRIVATE ROOM

## 2019-08-04 PROCEDURE — 99233 SBSQ HOSP IP/OBS HIGH 50: CPT | Mod: ,,, | Performed by: PSYCHIATRY & NEUROLOGY

## 2019-08-04 RX ADMIN — RAMELTEON 8 MG: 8 TABLET, FILM COATED ORAL at 10:08

## 2019-08-04 RX ADMIN — FLUOXETINE 20 MG: 20 CAPSULE ORAL at 09:08

## 2019-08-04 RX ADMIN — ASPIRIN 325 MG: 325 TABLET, COATED ORAL at 09:08

## 2019-08-04 RX ADMIN — HEPARIN SODIUM 5000 UNITS: 5000 INJECTION, SOLUTION INTRAVENOUS; SUBCUTANEOUS at 10:08

## 2019-08-04 RX ADMIN — CLOPIDOGREL 75 MG: 75 TABLET, FILM COATED ORAL at 09:08

## 2019-08-04 RX ADMIN — HEPARIN SODIUM 5000 UNITS: 5000 INJECTION, SOLUTION INTRAVENOUS; SUBCUTANEOUS at 05:08

## 2019-08-04 RX ADMIN — HEPARIN SODIUM 5000 UNITS: 5000 INJECTION, SOLUTION INTRAVENOUS; SUBCUTANEOUS at 03:08

## 2019-08-04 RX ADMIN — ONDANSETRON HYDROCHLORIDE 4 MG: 4 TABLET, FILM COATED ORAL at 09:08

## 2019-08-04 RX ADMIN — ATORVASTATIN CALCIUM 80 MG: 20 TABLET, FILM COATED ORAL at 09:08

## 2019-08-04 NOTE — ASSESSMENT & PLAN NOTE
Upon review of brain imaging, small area of cytotoxic cerebral edema identified in the territory of the Left middle cerebral artery. There is not associated mass effect.   We will continue to monitor the patients clinical exam for any worsening of symptoms which may indicate expansion of the stroke or the area of edema resulting in such clinical change.   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

## 2019-08-04 NOTE — PROGRESS NOTES
[facility]  Vascular Neurology  Comprehensive Stroke Center  Progress Note    Assessment/Plan:     * Thrombotic stroke involving left middle cerebral artery  40 yo previously healthy F with recent subacute L basal ganglia stroke leading to dysarthria and R-sided weakness as well as several weeks of intermittent symptoms of confusion, LLE spasticity, and emotional lability following initial stroke. Work-up notable for vessel imaging initially concerning for vasculitis, later with radiology noting Moyamoya pathology. Pt transferred to [facility] for IR angiogram to evaluate.     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 Cause remains unclear - accelerated atherosclerosis possible though pt reports no significant family history vs underlying autoimmune disease though work-up previously performed at OSH appears negative vs underlying hematologic disorder with abnormal labs from OSH, though will likely need repeating once pt is outside the acute phase. Feel that CNS vasculitis is unlikely (focal stenosis, negative LP.) HIV test negative, peripheral smear pending.    Pt c/o nausea, dizziness; ordered NS bolus and encouraged drinking water in order to increase cerebral perfusion, BP. Started Prozac.       Antithrombotics for secondary stroke prevention:  Start ASA 325mg Daily, Clopidogrel 75mg Daily  Statins for secondary stroke prevention/HLD: Atorvastatin 80 mg qd  Aggressive risk factor modification: HLD, Diet, Exercise  Rehab efforts: PT/OT/SLP - Dispo rehab  Diagnostics ordered/pending: None   VTE prophylaxis: Heparin 5000 units SQ every 8 hours  place SCDs (ok for no compression stockings per staff)  BP parameters: Infarct: SBP goal 140-160    Reactive depression  Pt becomes very emotional on multiple occasions during interview today  Sister states pt is overwhelmed by the struggles of her illness over recent months  and that she is ready to be out of the hospital/go home.  Pt amenable to SSRI; started Prozac 20mg Daily.    Dysarthria  -S/p L basal ganglia infarct, SLP to evaluate and treat  Dispo rehab    Unsteady gait  -S/p L basal ganglia infarct, PT/OT to evaluate and treat  Dispo rehab    Spasticity  -Continue baclofen 5 mg bid prn muscle spasms, started at OSH.  Titrate up as needed.  Pt denies these complaints on 8/3; Will continue to monitor.    Cytotoxic cerebral edema  Upon review of brain imaging, small area of cytotoxic cerebral edema identified in the territory of the Left middle cerebral artery. There is not associated mass effect.   We will continue to monitor the patients clinical exam for any worsening of symptoms which may indicate expansion of the stroke or the area of edema resulting in such clinical change.   Pattern is suggestive of a vasculitis etiology.       08/01/19:  Transfer from [facility]  8/2- received angiogram   8/3: Discussed angiogram results with pt and sister at bedside; started H, ordered HIV and peripheral smear. Pt c/o nausea, dizziness; ordered NS bolus and encouraged drinking water in order to increase cerebral perfusion, BP. Pt very emotional on interview today; started Prozac.     STROKE DOCUMENTATION        NIH Scale:  1a. Level of Consciousness: 0-->Alert, keenly responsive  1b. LOC Questions: 0-->Answers both questions correctly  1c. LOC Commands: 0-->Performs both tasks correctly  2. Best Gaze: 0-->Normal  3. Visual: 0-->No visual loss  4. Facial Palsy: 0-->Normal symmetrical movements  5a. Motor Arm, Left: 0-->No drift, limb holds 90 (or 45) degrees for full 10 secs  5b. Motor Arm, Right: 0-->No drift, limb holds 90 (or 45) degrees for full 10 secs  6a. Motor Leg, Left: 0-->No drift, leg holds 30 degree position for full 5 secs  6b. Motor Leg, Right: 0-->No drift, leg holds 30 degree position for full 5 secs  7. Limb Ataxia: 0-->Absent  8. Sensory: 0-->Normal,  no sensory loss  9. Best Language: 0-->No aphasia, normal  10. Dysarthria: 1-->Mild-to-moderate dysarthria, patient slurs at least some words and, at worst, can be understood with some difficulty  11. Extinction and Inattention (formerly Neglect): 0-->No abnormality  Total (NIH Stroke Scale): 1       Modified Chris Score: 2  Little Coma Scale:    ABCD2 Score:    [id number] Score:   HAS -BLED Score:   ICH Score:   Hunt & Sarabia Classification:      Hemorrhagic change of an Ischemic Stroke: Does this patient have an ischemic stroke with hemorrhagic changes? No     Neurologic Chief Complaint: R hemiparesis, slurred speech -- L basal ganglia stroke    Subjective:     Interval History: Patient is seen for follow-up neurological assessment and treatment recommendations: ANNE-MARIE. Discussed angiogram results with pt and sister at bedside; started SQH, ordered HIV and peripheral smear. Pt c/o nausea, dizziness; ordered NS bolus and encouraged drinking water in order to increase cerebral perfusion, BP. Pt very emotional on interview today; started Prozac.     HPI, Past Medical, Family, and Social History remains the same as documented in the initial encounter.     Review of Systems   Constitutional: Negative for fatigue and fever.   Gastrointestinal: Positive for nausea. Negative for vomiting.   Musculoskeletal: Negative for arthralgias and myalgias.   Neurological: Positive for dizziness, speech difficulty and weakness.   Psychiatric/Behavioral: Negative for agitation and confusion.     Scheduled Meds:   aspirin  325 mg Oral Daily    atorvastatin  80 mg Oral Daily    clopidogrel  75 mg Oral Daily    FLUoxetine  20 mg Oral Daily    heparin (porcine)  5,000 Units Subcutaneous Q8H     Continuous Infusions:  PRN Meds:acetaminophen, baclofen, lidocaine, ondansetron, ramelteon, sodium chloride 0.9%    Objective:     Vital Signs (Most Recent):  Temp: 97.6 °F (36.4 °C) (08/03/19 1916)  Pulse: 80 (08/03/19 1916)  Resp: 18  (08/03/19 1916)  BP: 118/86 (08/03/19 1916)  SpO2: 96 % (08/03/19 1916)  BP Location: Left arm    Vital Signs Range (Last 24H):  Temp:  [96.4 °F (35.8 °C)-98.1 °F (36.7 °C)]   Pulse:  [55-94]   Resp:  [16-18]   BP: ()/(60-86)   SpO2:  [96 %-99 %]   BP Location: Left arm    Physical Exam   Constitutional: She is oriented to person, place, and time. She appears well-developed and well-nourished. No distress.   HENT:   Head: Normocephalic and atraumatic.   Eyes: Conjunctivae and EOM are normal.   Cardiovascular: Normal rate.   Pulmonary/Chest: Effort normal. No respiratory distress.   Musculoskeletal: She exhibits no edema, tenderness or deformity.   Neurological: She is alert and oriented to person, place, and time.   Psychiatric: Her affect is labile. She is attentive.       Neurological Exam:   LOC: alert  Attention Span: Good   Language: No aphasia  Articulation: Dysarthria  Orientation: Person, Place, Time   Visual Fields: Full  EOM (CN III, IV, VI): Full/intact  Facial Movement (CN VII): Symmetric facial expression    Motor: Arm left  Normal 5/5  Leg left  Normal 5/5  Arm right  Paresis: 4/5  Leg right Normal 5/5  Sensation: Intact to light touch, temperature and vibration  Tone: Normal tone throughout    Laboratory:  CMP:   Recent Labs   Lab 08/03/19  0353   CALCIUM 8.9   ALBUMIN 3.6   PROT 6.9      K 3.7   CO2 24      BUN 11   CREATININE 0.8   ALKPHOS 93   ALT 16   AST 13   BILITOT 0.3     BMP:   Recent Labs   Lab 08/03/19  0353      K 3.7      CO2 24   BUN 11   CREATININE 0.8   CALCIUM 8.9     CBC:   Recent Labs   Lab 08/03/19  1311   WBC 7.10   RBC 4.27   HGB 12.4   HCT 38.7      MCV 91   MCH 29.0   MCHC 32.0     Lipid Panel:   Recent Labs   Lab 07/28/19  0555   CHOL 135   LDLCALC 74.6   HDL 45   TRIG 77     Coagulation:   Recent Labs   Lab 08/02/19  0518   INR 0.9   APTT <21.0     Platelet Aggregation Study: No results for input(s): PLTAGG, PLTAGINTERP, PLTAGREGLACO,  ADPPLTAGGREG in the last 168 hours.  Hgb A1C:   No results for input(s): HGBA1C in the last 168 hours.      Diagnostic Results     Brain Imaging     07/27/19 MRI Brain w/ w/o contrast:    1. Intermediate restricted diffusion signal in the left basal ganglia with faint enhancement and low associated ADC signal is most consistent with a subacute infarct     07/28/19 MRI C, T spine demyelinating w/ w/o contrast:  1.  There is a nonspecific, nonenhancing faint high STIR signal focus in the left paramedian ventral aspect of the medulla. This could reflect a demyelinating plaque or an acute lacunar infarction.  2. No cord signal abnormality in the thoracic spine.  3. There is a focal left subarticular disc protrusion causing mild narrowing of the left lateral recess of the canal.     07/31/19 MRI Brain w/ w/o contrast:    1. Likely old left posterior putaminal infarct with encephalomalacia.  Subacute infarct of the caudate head and anterior and extreme posterior left putamen.  2. Non-specific slight increased tonsillar ectopia with mild crowding at the foramen magnum compared to study from 07/27/2019 preserved rounding of the inferior tonsillar tips.  3. Stable subtle nonspecific sulcal FLAIR signal abnormality in the occipital lobes bilaterally.      Vessel Imaging     IR Cerebral Angiogram 8/2/19  Final read pending -- Per staff review, noted focal tapering of distal M1, consistent with Leslie Leslie syndrome.     MRA Brain WO Contrast 7/31/19  Small left MCA with moyamoya physiology on the left.    07/27/19 CTA head and neck:  1.  There is marked narrowing of the distal portion of the M1 segment of the left middle cerebral artery (see for example sequence 13, image 46) and no visible perfusion of the M2 and M3 branches of the left middle cerebral artery.  2. The left anterior cerebral artery is perfused by the right internal carotid artery via the anterior communicating artery.  This is likely a normal variant.  3. There  is no other cervical carotid or intracranial vascular abnormality.      Cardiac Imaging     07/29/19 TTE with CFD, Shunt Study:  · Normal left ventricular systolic function. The estimated ejection fraction is 55%  · Normal LV diastolic function.  · Normal right ventricular systolic function.  · The estimated PA systolic pressure is 17 mm Hg  · Study is negative for shunt.      Charmaine You PA-C  [facility] Stroke [facility]  Department of Vascular Neurology   [facility]

## 2019-08-04 NOTE — ASSESSMENT & PLAN NOTE
38 yo previously healthy F with recent subacute L basal ganglia stroke leading to dysarthria and R-sided weakness as well as several weeks of intermittent symptoms of confusion, LLE spasticity, and emotional lability following initial stroke. Work-up notable for vessel imaging initially concerning for vasculitis, later with radiology noting Moyamoya pathology. Pt also noted to have concern for demyelinating plaque vs acute infarct on MRI Cervical spine from outside facility. Pt transferred to [facility] [place] for IR angiogram to evaluate.     Discussed results with pt and sister at bedside; angiography with focal tapering of distal M1, consistent with early stages of Bergman bergman disease. Cause remains unclear - accelerated atherosclerosis possible (though pt reports no significant family history) vs underlying autoimmune disease (work-up previously performed at OSH appears negative) vs underlying hematologic disorder with abnormal labs from OSH (these will likely need repeating once pt is outside the acute phase.) Feel that CNS vasculitis is unlikely (focal stenosis, negative LP.) HIV test negative, peripheral smear pending.    She continues to hydrate for soft BPs. Per lab, pt's peripheral blood smear will be interpreted by a pathologist tomorrow and result will be listed under 8/3/19 CBC.      Antithrombotics for secondary stroke prevention:  Start ASA 325mg Daily, Clopidogrel 75mg Daily  Statins for secondary stroke prevention/HLD: Atorvastatin 80 mg qd  Aggressive risk factor modification: HLD, Diet, Exercise  Rehab efforts: PT/OT/SLP - Dispo rehab  Diagnostics ordered/pending: None   VTE prophylaxis: Heparin 5000 units SQ every 8 hours  place SCDs (ok for no compression stockings per staff)  BP parameters: Infarct: SBP goal 140-160

## 2019-08-04 NOTE — ASSESSMENT & PLAN NOTE
-Continue baclofen 5 mg bid prn muscle spasms, started at OSH.  Titrate up as needed.  Pt denies these complaints on 8/3; Will continue to monitor.

## 2019-08-04 NOTE — NURSING
"Patient c/o being drowsy and "spaced out" past Prozac, she stated that she was very sleepy today and wanted to decrease her Prozac dose.  SN explained to patient that it is not uncommon for medication such as Prozac to cause drowsness.     Instructed patient that this should subside once she becomes therapeutic on the medication and sometimes it can take up to a month  on the antidepressant.  Patient verbalized above instructions.   Will cont POC and monitor side effects.     "

## 2019-08-04 NOTE — ASSESSMENT & PLAN NOTE
Pt becomes very emotional on multiple occasions during interview today  Sister states pt is overwhelmed by the struggles of her illness over recent months and that she is ready to be out of the hospital/go home.  Pt amenable to SSRI; started Prozac 20mg Daily.  Pt is with improved mood on 8/4. Continuing to monitor.

## 2019-08-04 NOTE — SUBJECTIVE & OBJECTIVE
Neurologic Chief Complaint: R hemiparesis, slurred speech -- L basal ganglia stroke    Subjective:     Interval History: Patient is seen for follow-up neurological assessment and treatment recommendations: ANNE-MARIE. Discussed angiogram results with pt and sister at bedside; started SQH, ordered HIV and peripheral smear. Pt c/o nausea, dizziness; ordered NS bolus and encouraged drinking water in order to increase cerebral perfusion, BP. Pt very emotional on interview today; started Prozac.     HPI, Past Medical, Family, and Social History remains the same as documented in the initial encounter.     Review of Systems   Constitutional: Negative for fatigue and fever.   Gastrointestinal: Positive for nausea. Negative for vomiting.   Musculoskeletal: Negative for arthralgias and myalgias.   Neurological: Positive for dizziness, speech difficulty and weakness.   Psychiatric/Behavioral: Negative for agitation and confusion.     Scheduled Meds:   aspirin  325 mg Oral Daily    atorvastatin  80 mg Oral Daily    clopidogrel  75 mg Oral Daily    FLUoxetine  20 mg Oral Daily    heparin (porcine)  5,000 Units Subcutaneous Q8H     Continuous Infusions:  PRN Meds:acetaminophen, baclofen, lidocaine, ondansetron, ramelteon, sodium chloride 0.9%    Objective:     Vital Signs (Most Recent):  Temp: 97.6 °F (36.4 °C) (08/03/19 1916)  Pulse: 80 (08/03/19 1916)  Resp: 18 (08/03/19 1916)  BP: 118/86 (08/03/19 1916)  SpO2: 96 % (08/03/19 1916)  BP Location: Left arm    Vital Signs Range (Last 24H):  Temp:  [96.4 °F (35.8 °C)-98.1 °F (36.7 °C)]   Pulse:  [55-94]   Resp:  [16-18]   BP: ()/(60-86)   SpO2:  [96 %-99 %]   BP Location: Left arm    Physical Exam   Constitutional: She is oriented to person, place, and time. She appears well-developed and well-nourished. No distress.   HENT:   Head: Normocephalic and atraumatic.   Eyes: Conjunctivae and EOM are normal.   Cardiovascular: Normal rate.   Pulmonary/Chest: Effort normal. No  respiratory distress.   Musculoskeletal: She exhibits no edema, tenderness or deformity.   Neurological: She is alert and oriented to person, place, and time.   Psychiatric: Her affect is labile. She is attentive.       Neurological Exam:   LOC: alert  Attention Span: Good   Language: No aphasia  Articulation: Dysarthria  Orientation: Person, Place, Time   Visual Fields: Full  EOM (CN III, IV, VI): Full/intact  Facial Movement (CN VII): Symmetric facial expression    Motor: Arm left  Normal 5/5  Leg left  Normal 5/5  Arm right  Paresis: 4/5  Leg right Normal 5/5  Sensation: Intact to light touch, temperature and vibration  Tone: Normal tone throughout    Laboratory:  CMP:   Recent Labs   Lab 08/03/19  0353   CALCIUM 8.9   ALBUMIN 3.6   PROT 6.9      K 3.7   CO2 24      BUN 11   CREATININE 0.8   ALKPHOS 93   ALT 16   AST 13   BILITOT 0.3     BMP:   Recent Labs   Lab 08/03/19  0353      K 3.7      CO2 24   BUN 11   CREATININE 0.8   CALCIUM 8.9     CBC:   Recent Labs   Lab 08/03/19  1311   WBC 7.10   RBC 4.27   HGB 12.4   HCT 38.7      MCV 91   MCH 29.0   MCHC 32.0     Lipid Panel:   Recent Labs   Lab 07/28/19  0555   CHOL 135   LDLCALC 74.6   HDL 45   TRIG 77     Coagulation:   Recent Labs   Lab 08/02/19  0518   INR 0.9   APTT <21.0     Platelet Aggregation Study: No results for input(s): PLTAGG, PLTAGINTERP, PLTAGREGLACO, ADPPLTAGGREG in the last 168 hours.  Hgb A1C:   No results for input(s): HGBA1C in the last 168 hours.      Diagnostic Results     Brain Imaging     07/27/19 MRI Brain w/ w/o contrast:    1. Intermediate restricted diffusion signal in the left basal ganglia with faint enhancement and low associated ADC signal is most consistent with a subacute infarct     07/28/19 MRI C, T spine demyelinating w/ w/o contrast:  1.  There is a nonspecific, nonenhancing faint high STIR signal focus in the left paramedian ventral aspect of the medulla. This could reflect a demyelinating  plaque or an acute lacunar infarction.  2. No cord signal abnormality in the thoracic spine.  3. There is a focal left subarticular disc protrusion causing mild narrowing of the left lateral recess of the canal.     07/31/19 MRI Brain w/ w/o contrast:    1. Likely old left posterior putaminal infarct with encephalomalacia.  Subacute infarct of the caudate head and anterior and extreme posterior left putamen.  2. Non-specific slight increased tonsillar ectopia with mild crowding at the foramen magnum compared to study from 07/27/2019 preserved rounding of the inferior tonsillar tips.  3. Stable subtle nonspecific sulcal FLAIR signal abnormality in the occipital lobes bilaterally.      Vessel Imaging     IR Cerebral Angiogram 8/2/19  Final read pending -- Per staff review, noted focal tapering of distal M1, consistent with Leslie Leslie syndrome.     MRA Brain WO Contrast 7/31/19  Small left MCA with moyamoya physiology on the left.    07/27/19 CTA head and neck:  1.  There is marked narrowing of the distal portion of the M1 segment of the left middle cerebral artery (see for example sequence 13, image 46) and no visible perfusion of the M2 and M3 branches of the left middle cerebral artery.  2. The left anterior cerebral artery is perfused by the right internal carotid artery via the anterior communicating artery.  This is likely a normal variant.  3. There is no other cervical carotid or intracranial vascular abnormality.      Cardiac Imaging     07/29/19 TTE with CFD, Shunt Study:  · Normal left ventricular systolic function. The estimated ejection fraction is 55%  · Normal LV diastolic function.  · Normal right ventricular systolic function.  · The estimated PA systolic pressure is 17 mm Hg  · Study is negative for shunt.

## 2019-08-04 NOTE — SUBJECTIVE & OBJECTIVE
Neurologic Chief Complaint: R hemiparesis, slurred speech -- L basal ganglia stroke    Subjective:     Interval History: Patient is seen for follow-up neurological assessment and treatment recommendations: ANNE-MARIE. Pt c/o nausea this AM, no vomitting; received PRN Zofran. She continues to hydrate for soft BPs. Per lab, pt's peripheral blood smear will be interpreted by a pathologist tomorrow and result will be listed under 8/3 CBC.    HPI, Past Medical, Family, and Social History remains the same as documented in the initial encounter.     Review of Systems   Constitutional: Negative for fatigue and fever.   Gastrointestinal: Positive for nausea. Negative for vomiting.   Musculoskeletal: Negative for arthralgias and myalgias.   Neurological: Positive for speech difficulty and weakness. Negative for dizziness.   Psychiatric/Behavioral: Negative for agitation and confusion.     Scheduled Meds:   aspirin  325 mg Oral Daily    atorvastatin  80 mg Oral Daily    clopidogrel  75 mg Oral Daily    FLUoxetine  20 mg Oral Daily    heparin (porcine)  5,000 Units Subcutaneous Q8H     Continuous Infusions:  PRN Meds:acetaminophen, baclofen, lidocaine, ondansetron, ramelteon, sodium chloride 0.9%    Objective:     Vital Signs (Most Recent):  Temp: 97.5 °F (36.4 °C) (08/04/19 1203)  Pulse: 62 (08/04/19 1532)  Resp: 18 (08/04/19 0808)  BP: 109/70 (08/04/19 1203)  SpO2: 99 % (08/04/19 1203)  BP Location: Right arm    Vital Signs Range (Last 24H):  Temp:  [97 °F (36.1 °C)-98.7 °F (37.1 °C)]   Pulse:  []   Resp:  [16-18]   BP: (107-118)/(69-86)   SpO2:  [96 %-100 %]   BP Location: Right arm    Physical Exam   Constitutional: She is oriented to person, place, and time. She appears well-developed and well-nourished. No distress.   HENT:   Head: Normocephalic and atraumatic.   Eyes: Conjunctivae and EOM are normal.   Cardiovascular: Normal rate.   Pulmonary/Chest: Effort normal. No respiratory distress.   Musculoskeletal: She  exhibits no edema, tenderness or deformity.   Neurological: She is alert and oriented to person, place, and time.   Psychiatric: She has a normal mood and affect. She is attentive.       Neurological Exam:   LOC: alert  Attention Span: Good   Language: No aphasia  Articulation: Dysarthria  Orientation: Person, Place, Time   Visual Fields: Full  EOM (CN III, IV, VI): Full/intact  Facial Movement (CN VII): Symmetric facial expression    Motor: Arm left  Normal 5/5  Leg left  Normal 5/5  Arm right  Paresis: 4/5  Leg right Normal 5/5  Sensation: Intact to light touch, temperature and vibration  Tone: Normal tone throughout    Laboratory:  CMP:   No results for input(s): GLUCOSE, CALCIUM, ALBUMIN, PROT, NA, K, CO2, CL, BUN, CREATININE, ALKPHOS, ALT, AST, BILITOT in the last 24 hours.  BMP:   Recent Labs   Lab 08/03/19  0353      K 3.7      CO2 24   BUN 11   CREATININE 0.8   CALCIUM 8.9     CBC:   Recent Labs   Lab 08/03/19  1311   WBC 7.10   RBC 4.27   HGB 12.4   HCT 38.7      MCV 91   MCH 29.0   MCHC 32.0     Lipid Panel:   No results for input(s): CHOL, LDLCALC, HDL, TRIG in the last 168 hours.  Coagulation:   Recent Labs   Lab 08/02/19  0518   INR 0.9   APTT <21.0     Platelet Aggregation Study: No results for input(s): PLTAGG, PLTAGINTERP, PLTAGREGLACO, ADPPLTAGGREG in the last 168 hours.  Hgb A1C:   No results for input(s): HGBA1C in the last 168 hours.      Diagnostic Results     Brain Imaging     07/27/19 MRI Brain w/ w/o contrast:    1. Intermediate restricted diffusion signal in the left basal ganglia with faint enhancement and low associated ADC signal is most consistent with a subacute infarct     07/28/19 MRI C, T spine demyelinating w/ w/o contrast:  1.  There is a nonspecific, nonenhancing faint high STIR signal focus in the left paramedian ventral aspect of the medulla. This could reflect a demyelinating plaque or an acute lacunar infarction.  2. No cord signal abnormality in the  thoracic spine.  3. There is a focal left subarticular disc protrusion causing mild narrowing of the left lateral recess of the canal.     07/31/19 MRI Brain w/ w/o contrast:    1. Likely old left posterior putaminal infarct with encephalomalacia.  Subacute infarct of the caudate head and anterior and extreme posterior left putamen.  2. Non-specific slight increased tonsillar ectopia with mild crowding at the foramen magnum compared to study from 07/27/2019 preserved rounding of the inferior tonsillar tips.  3. Stable subtle nonspecific sulcal FLAIR signal abnormality in the occipital lobes bilaterally.      Vessel Imaging     IR Cerebral Angiogram 8/2/19  Abnormal left M1 segment which tapers to high-grade stenosis.  There are hypertrophied lenticulostriate moyamoya type collaterals from the left M1 segment.  Both anterior cerebral arteries fill from the right, and there is leptomeningeal flow from left PENELOPE to left MCA territory.    MRA Brain WO Contrast 7/31/19  Small left MCA with moyamoya physiology on the left.    07/27/19 CTA head and neck:  1.  There is marked narrowing of the distal portion of the M1 segment of the left middle cerebral artery (see for example sequence 13, image 46) and no visible perfusion of the M2 and M3 branches of the left middle cerebral artery.  2. The left anterior cerebral artery is perfused by the right internal carotid artery via the anterior communicating artery.  This is likely a normal variant.  3. There is no other cervical carotid or intracranial vascular abnormality.      Cardiac Imaging     07/29/19 TTE with CFD, Shunt Study:  · Normal left ventricular systolic function. The estimated ejection fraction is 55%  · Normal LV diastolic function.  · Normal right ventricular systolic function.  · The estimated PA systolic pressure is 17 mm Hg  · Study is negative for shunt.

## 2019-08-04 NOTE — PROGRESS NOTES
[facility]  Vascular Neurology  Comprehensive Stroke Center  Progress Note    Assessment/Plan:     * Thrombotic stroke involving left middle cerebral artery  40 yo previously healthy F with recent subacute L basal ganglia stroke leading to dysarthria and R-sided weakness as well as several weeks of intermittent symptoms of confusion, LLE spasticity, and emotional lability following initial stroke. Work-up notable for vessel imaging initially concerning for vasculitis, later with radiology noting Moyamoya pathology. Pt also noted to have concern for demyelinating plaque vs acute infarct on MRI Cervical spine from outside facility. Pt transferred to [facility] for IR angiogram to evaluate.     Discussed results with pt and sister at bedside; angiography with focal tapering of distal M1, consistent with early stages of Bergman bergman disease. Cause remains unclear - accelerated atherosclerosis possible (though pt reports no significant family history) vs underlying autoimmune disease (work-up previously performed at OSH appears negative) vs underlying hematologic disorder with abnormal labs from OSH (these will likely need repeating once pt is outside the acute phase.) Feel that CNS vasculitis is unlikely (focal stenosis, negative LP.) HIV test negative, peripheral smear pending.    She continues to hydrate for soft BPs. Per lab, pt's peripheral blood smear will be interpreted by a pathologist tomorrow and result will be listed under 8/3/19 CBC.      Antithrombotics for secondary stroke prevention:  Start ASA 325mg Daily, Clopidogrel 75mg Daily  Statins for secondary stroke prevention/HLD: Atorvastatin 80 mg qd  Aggressive risk factor modification: HLD, Diet, Exercise  Rehab efforts: PT/OT/SLP - Dispo rehab  Diagnostics ordered/pending: None   VTE prophylaxis: Heparin 5000 units SQ every 8 hours  place SCDs (ok for no compression stockings per staff)  BP parameters: Infarct: SBP goal 140-160    Nausea  Pt  reporting sensitivity to strong scents.  +nausea but no vomiting  Continue PRN Zofran    Reactive depression  Pt becomes very emotional on multiple occasions during interview today  Sister states pt is overwhelmed by the struggles of her illness over recent months and that she is ready to be out of the hospital/go home.  Pt amenable to SSRI; started Prozac 20mg Daily.  Pt is with improved mood on 8/4. Continuing to monitor.    Dysarthria  -S/p L basal ganglia infarct, SLP to evaluate and treat  Dispo rehab    Unsteady gait  -S/p L basal ganglia infarct, PT/OT to evaluate and treat  Dispo rehab    Spasticity  -Continue baclofen 5 mg bid prn muscle spasms, started at OSH.  Titrate up as needed.  Pt denies these complaints on 8/3; Will continue to monitor.    Cytotoxic cerebral edema  Upon review of brain imaging, small area of cytotoxic cerebral edema identified in the territory of the Left middle cerebral artery. There is not associated mass effect.   We will continue to monitor the patients clinical exam for any worsening of symptoms which may indicate expansion of the stroke or the area of edema resulting in such clinical change.   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         08/01/19:  Transfer from [facility]  8/2- received angiogram   8/3: Discussed angiogram results with pt and sister at bedside; started SQH, ordered HIV and peripheral smear. Pt c/o nausea, dizziness; ordered NS bolus and encouraged drinking water in order to increase cerebral perfusion, BP. Pt very emotional on interview today; started Prozac.   8/4: Pt c/o nausea this AM, no vomitting; received PRN Zofran. She continues to hydrate for soft BPs. Per lab, pt's peripheral blood smear will be interpreted by a pathologist tomorrow and result will be listed under 8/3 CBC.    STROKE DOCUMENTATION        NIH Scale:  1a. Level of Consciousness: 0-->Alert, keenly responsive  1b. LOC Questions:  0-->Answers both questions correctly  1c. LOC Commands: 0-->Performs both tasks correctly  2. Best Gaze: 0-->Normal  3. Visual: 0-->No visual loss  4. Facial Palsy: 0-->Normal symmetrical movements  5a. Motor Arm, Left: 0-->No drift, limb holds 90 (or 45) degrees for full 10 secs  5b. Motor Arm, Right: 0-->No drift, limb holds 90 (or 45) degrees for full 10 secs  6a. Motor Leg, Left: 0-->No drift, leg holds 30 degree position for full 5 secs  6b. Motor Leg, Right: 0-->No drift, leg holds 30 degree position for full 5 secs  7. Limb Ataxia: 0-->Absent  8. Sensory: 0-->Normal, no sensory loss  9. Best Language: 0-->No aphasia, normal  10. Dysarthria: 1-->Mild-to-moderate dysarthria, patient slurs at least some words and, at worst, can be understood with some difficulty  11. Extinction and Inattention (formerly Neglect): 0-->No abnormality  Total (NIH Stroke Scale): 1       Modified [place] Score: 2  Little Coma Scale:    ABCD2 Score:    [id number] Score:   HAS -BLED Score:   ICH Score:   Hunt & Sarabia Classification:      Hemorrhagic change of an Ischemic Stroke: Does this patient have an ischemic stroke with hemorrhagic changes? No     Neurologic Chief Complaint: R hemiparesis, slurred speech -- L basal ganglia stroke    Subjective:     Interval History: Patient is seen for follow-up neurological assessment and treatment recommendations: ANNE-MARIE. Pt c/o nausea this AM, no vomitting; received PRN Zofran. She continues to hydrate for soft BPs. Per lab, pt's peripheral blood smear will be interpreted by a pathologist tomorrow and result will be listed under 8/3 CBC.    HPI, Past Medical, Family, and Social History remains the same as documented in the initial encounter.     Review of Systems   Constitutional: Negative for fatigue and fever.   Gastrointestinal: Positive for nausea. Negative for vomiting.   Musculoskeletal: Negative for arthralgias and myalgias.   Neurological: Positive for speech difficulty and weakness.  Negative for dizziness.   Psychiatric/Behavioral: Negative for agitation and confusion.     Scheduled Meds:   aspirin  325 mg Oral Daily    atorvastatin  80 mg Oral Daily    clopidogrel  75 mg Oral Daily    FLUoxetine  20 mg Oral Daily    heparin (porcine)  5,000 Units Subcutaneous Q8H     Continuous Infusions:  PRN Meds:acetaminophen, baclofen, lidocaine, ondansetron, ramelteon, sodium chloride 0.9%    Objective:     Vital Signs (Most Recent):  Temp: 97.5 °F (36.4 °C) (08/04/19 1203)  Pulse: 62 (08/04/19 1532)  Resp: 18 (08/04/19 0808)  BP: 109/70 (08/04/19 1203)  SpO2: 99 % (08/04/19 1203)  BP Location: Right arm    Vital Signs Range (Last 24H):  Temp:  [97 °F (36.1 °C)-98.7 °F (37.1 °C)]   Pulse:  []   Resp:  [16-18]   BP: (107-118)/(69-86)   SpO2:  [96 %-100 %]   BP Location: Right arm    Physical Exam   Constitutional: She is oriented to person, place, and time. She appears well-developed and well-nourished. No distress.   HENT:   Head: Normocephalic and atraumatic.   Eyes: Conjunctivae and EOM are normal.   Cardiovascular: Normal rate.   Pulmonary/Chest: Effort normal. No respiratory distress.   Musculoskeletal: She exhibits no edema, tenderness or deformity.   Neurological: She is alert and oriented to person, place, and time.   Psychiatric: She has a normal mood and affect. She is attentive.       Neurological Exam:   LOC: alert  Attention Span: Good   Language: No aphasia  Articulation: Dysarthria  Orientation: Person, Place, Time   Visual Fields: Full  EOM (CN III, IV, VI): Full/intact  Facial Movement (CN VII): Symmetric facial expression    Motor: Arm left  Normal 5/5  Leg left  Normal 5/5  Arm right  Paresis: 4/5  Leg right Normal 5/5  Sensation: Intact to light touch, temperature and vibration  Tone: Normal tone throughout    Laboratory:  CMP:   No results for input(s): GLUCOSE, CALCIUM, ALBUMIN, PROT, NA, K, CO2, CL, BUN, CREATININE, ALKPHOS, ALT, AST, BILITOT in the last 24 hours.  BMP:    Recent Labs   Lab 08/03/19  0353      K 3.7      CO2 24   BUN 11   CREATININE 0.8   CALCIUM 8.9     CBC:   Recent Labs   Lab 08/03/19  1311   WBC 7.10   RBC 4.27   HGB 12.4   HCT 38.7      MCV 91   MCH 29.0   MCHC 32.0     Lipid Panel:   No results for input(s): CHOL, LDLCALC, HDL, TRIG in the last 168 hours.  Coagulation:   Recent Labs   Lab 08/02/19  0518   INR 0.9   APTT <21.0     Platelet Aggregation Study: No results for input(s): PLTAGG, PLTAGINTERP, PLTAGREGLACO, ADPPLTAGGREG in the last 168 hours.  Hgb A1C:   No results for input(s): HGBA1C in the last 168 hours.      Diagnostic Results     Brain Imaging     07/27/19 MRI Brain w/ w/o contrast:    1. Intermediate restricted diffusion signal in the left basal ganglia with faint enhancement and low associated ADC signal is most consistent with a subacute infarct     07/28/19 MRI C, T spine demyelinating w/ w/o contrast:  1.  There is a nonspecific, nonenhancing faint high STIR signal focus in the left paramedian ventral aspect of the medulla. This could reflect a demyelinating plaque or an acute lacunar infarction.  2. No cord signal abnormality in the thoracic spine.  3. There is a focal left subarticular disc protrusion causing mild narrowing of the left lateral recess of the canal.     07/31/19 MRI Brain w/ w/o contrast:    1. Likely old left posterior putaminal infarct with encephalomalacia.  Subacute infarct of the caudate head and anterior and extreme posterior left putamen.  2. Non-specific slight increased tonsillar ectopia with mild crowding at the foramen magnum compared to study from 07/27/2019 preserved rounding of the inferior tonsillar tips.  3. Stable subtle nonspecific sulcal FLAIR signal abnormality in the occipital lobes bilaterally.      Vessel Imaging     IR Cerebral Angiogram 8/2/19  Abnormal left M1 segment which tapers to high-grade stenosis.  There are hypertrophied lenticulostriate moyamoya type collaterals from the  left M1 segment.  Both anterior cerebral arteries fill from the right, and there is leptomeningeal flow from left PENELOPE to left MCA territory.    MRA Brain WO Contrast 7/31/19  Small left MCA with moyamoya physiology on the left.    07/27/19 CTA head and neck:  1.  There is marked narrowing of the distal portion of the M1 segment of the left middle cerebral artery (see for example sequence 13, image 46) and no visible perfusion of the M2 and M3 branches of the left middle cerebral artery.  2. The left anterior cerebral artery is perfused by the right internal carotid artery via the anterior communicating artery.  This is likely a normal variant.  3. There is no other cervical carotid or intracranial vascular abnormality.      Cardiac Imaging     07/29/19 TTE with CFD, Shunt Study:  · Normal left ventricular systolic function. The estimated ejection fraction is 55%  · Normal LV diastolic function.  · Normal right ventricular systolic function.  · The estimated PA systolic pressure is 17 mm Hg  · Study is negative for shunt.      Charmaine You PA-C  Comprehensive Stroke Center  Department of Vascular Neurology   [facility]

## 2019-08-04 NOTE — ASSESSMENT & PLAN NOTE
38 yo previously healthy F with recent subacute L basal ganglia stroke leading to dysarthria and R-sided weakness as well as several weeks of intermittent symptoms of confusion, LLE spasticity, and emotional lability following initial stroke. Work-up notable for vessel imaging initially concerning for vasculitis, later with radiology noting Moyamoya pathology. Pt transferred to [facility] for IR angiogram to evaluate.     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 Cause remains unclear - accelerated atherosclerosis possible though pt reports no significant family history vs underlying autoimmune disease though work-up previously performed at OSH appears negative vs underlying hematologic disorder with abnormal labs from OSH, though will likely need repeating once pt is outside the acute phase. Feel that CNS vasculitis is unlikely (focal stenosis, negative LP.) HIV test negative, peripheral smear pending.    Pt c/o nausea, dizziness; ordered NS bolus and encouraged drinking water in order to increase cerebral perfusion, BP. Started Prozac.       Antithrombotics for secondary stroke prevention:  Start ASA 325mg Daily, Clopidogrel 75mg Daily  Statins for secondary stroke prevention/HLD: Atorvastatin 80 mg qd  Aggressive risk factor modification: HLD, Diet, Exercise  Rehab efforts: PT/OT/SLP - Dispo rehab  Diagnostics ordered/pending: None   VTE prophylaxis: Heparin 5000 units SQ every 8 hours  place SCDs (ok for no compression stockings per staff)  BP parameters: Infarct: SBP goal 140-160

## 2019-08-04 NOTE — ASSESSMENT & PLAN NOTE
Pt becomes very emotional on multiple occasions during interview today  Sister states pt is overwhelmed by the struggles of her illness over recent months and that she is ready to be out of the hospital/go home.  Pt amenable to SSRI; started Prozac 20mg Daily.

## 2019-08-05 VITALS
SYSTOLIC BLOOD PRESSURE: 115 MMHG | RESPIRATION RATE: 20 BRPM | DIASTOLIC BLOOD PRESSURE: 72 MMHG | BODY MASS INDEX: 25.27 KG/M2 | TEMPERATURE: 98 F | OXYGEN SATURATION: 99 % | WEIGHT: 148 LBS | HEART RATE: 73 BPM | HEIGHT: 64 IN

## 2019-08-05 LAB
PATH REV BLD -IMP: NORMAL
PATH REV BLD -IMP: NORMAL

## 2019-08-05 PROCEDURE — 99239 PR HOSPITAL DISCHARGE DAY,>30 MIN: ICD-10-PCS | Mod: ,,, | Performed by: PSYCHIATRY & NEUROLOGY

## 2019-08-05 PROCEDURE — 25000003 PHARM REV CODE 250: Performed by: STUDENT IN AN ORGANIZED HEALTH CARE EDUCATION/TRAINING PROGRAM

## 2019-08-05 PROCEDURE — 63600175 PHARM REV CODE 636 W HCPCS: Performed by: PHYSICIAN ASSISTANT

## 2019-08-05 PROCEDURE — 99239 HOSP IP/OBS DSCHRG MGMT >30: CPT | Mod: ,,, | Performed by: PSYCHIATRY & NEUROLOGY

## 2019-08-05 PROCEDURE — 92526 ORAL FUNCTION THERAPY: CPT

## 2019-08-05 PROCEDURE — 92507 TX SP LANG VOICE COMM INDIV: CPT

## 2019-08-05 PROCEDURE — 97116 GAIT TRAINING THERAPY: CPT

## 2019-08-05 PROCEDURE — 97535 SELF CARE MNGMENT TRAINING: CPT

## 2019-08-05 RX ORDER — ASPIRIN 325 MG
325 TABLET, DELAYED RELEASE (ENTERIC COATED) ORAL DAILY
Refills: 0 | COMMUNITY
Start: 2019-08-05 | End: 2023-07-10

## 2019-08-05 RX ORDER — ONDANSETRON 4 MG/1
4 TABLET, FILM COATED ORAL EVERY 8 HOURS PRN
Qty: 90 TABLET | Refills: 0 | Status: SHIPPED | OUTPATIENT
Start: 2019-08-05

## 2019-08-05 RX ORDER — FLUOXETINE HYDROCHLORIDE 20 MG/1
20 CAPSULE ORAL NIGHTLY
Status: DISCONTINUED | OUTPATIENT
Start: 2019-08-05 | End: 2019-08-05 | Stop reason: HOSPADM

## 2019-08-05 RX ORDER — METOCLOPRAMIDE HYDROCHLORIDE 5 MG/ML
10 INJECTION INTRAMUSCULAR; INTRAVENOUS ONCE
Status: COMPLETED | OUTPATIENT
Start: 2019-08-05 | End: 2019-08-05

## 2019-08-05 RX ORDER — FLUOXETINE HYDROCHLORIDE 20 MG/1
20 CAPSULE ORAL NIGHTLY
Qty: 30 CAPSULE | Refills: 11 | Status: SHIPPED | OUTPATIENT
Start: 2019-08-05 | End: 2023-07-10

## 2019-08-05 RX ORDER — BACLOFEN 5 MG/1
5 TABLET ORAL 2 TIMES DAILY PRN
Qty: 60 TABLET | Refills: 1 | Status: SHIPPED | OUTPATIENT
Start: 2019-08-05 | End: 2019-09-23

## 2019-08-05 RX ORDER — ATORVASTATIN CALCIUM 80 MG/1
80 TABLET, FILM COATED ORAL DAILY
Qty: 90 TABLET | Refills: 3 | Status: SHIPPED | OUTPATIENT
Start: 2019-08-05 | End: 2023-07-10

## 2019-08-05 RX ADMIN — HEPARIN SODIUM 5000 UNITS: 5000 INJECTION, SOLUTION INTRAVENOUS; SUBCUTANEOUS at 01:08

## 2019-08-05 RX ADMIN — ATORVASTATIN CALCIUM 80 MG: 20 TABLET, FILM COATED ORAL at 10:08

## 2019-08-05 RX ADMIN — CLOPIDOGREL 75 MG: 75 TABLET, FILM COATED ORAL at 10:08

## 2019-08-05 RX ADMIN — SODIUM CHLORIDE 1000 ML: 0.9 INJECTION, SOLUTION INTRAVENOUS at 01:08

## 2019-08-05 RX ADMIN — METOCLOPRAMIDE 10 MG: 5 INJECTION, SOLUTION INTRAMUSCULAR; INTRAVENOUS at 01:08

## 2019-08-05 RX ADMIN — ASPIRIN 325 MG: 325 TABLET, COATED ORAL at 10:08

## 2019-08-05 RX ADMIN — HEPARIN SODIUM 5000 UNITS: 5000 INJECTION, SOLUTION INTRAVENOUS; SUBCUTANEOUS at 06:08

## 2019-08-05 RX ADMIN — ONDANSETRON HYDROCHLORIDE 4 MG: 4 TABLET, FILM COATED ORAL at 06:08

## 2019-08-05 NOTE — PLAN OF CARE
Problem: Occupational Therapy Goal  Goal: Occupational Therapy Goal  Goals set 8/2 to be addressed for 7 days with expiration date, 8/9:  Patient will increase functional independence with ADLs by performing:    Patient will demonstrate stand pivot transfers with modified independence.   Not met  Patient will demonstrate grooming while standing with modified independence.   Not met  Patient will demonstrate upper body dressing with modified independence while seated EOB.   Not met  Patient will demonstrate lower body dressing with modified independence while seated EOB.   Not met  Patient will demonstrate toileting with modified independence.   Not met  Patient will demonstrate bathing while seated EOB with modified independence.   Not met  Patient will demonstrate independence with HEP for right UE weight bearing.    Not met  Patient's family / caregiver will demonstrate independence and safety with assisting patient with self-care skills and functional mobility.     Not met  Patient and/or patient's family will verbalize understanding of stroke prevention guidelines, personal risk factors and stroke warning signs via teachback method.  Not met          Goals remain appropriate.  ELIJAH Monet  8/5/2019

## 2019-08-05 NOTE — PRE ADMISSION SCREENING
Reviewed the PT/ OT notes, patient is able to walk unassisted and is to high functioning for insurance to approve at this point. When medically stable patient to go with outpatient.

## 2019-08-05 NOTE — ASSESSMENT & PLAN NOTE
-S/p L basal ganglia infarct  PT/OT to evaluate and treat  Pt has been ambulating in halls with sister. PT/OT re-evaluated pt and now feel that she is appropriate for outpatient therapy rather than inpatient rehab.

## 2019-08-05 NOTE — PT/OT/SLP PROGRESS
"Physical Therapy Treatment    Patient Name:  Ally Ponce   MRN:  [id number]    Recommendations:     Discharge Recommendations:  outpatient PT   Discharge Equipment Recommendations: none   Barriers to discharge: None    Assessment:     Ally Ponce is a 39 y.o. female admitted with a medical diagnosis of Thrombotic stroke involving left middle cerebral artery.  She presents with the following impairments/functional limitations:  weakness, impaired endurance, gait instability, impaired fine motor, impaired self care skills, impaired functional mobilty, impaired cognition. Patient progressing well and is steadily increasing with functional mobility independence. She will continue to benefit from skilled PT services during her LOS. At this time, upon D/C she will benefit from outpatient PT in order to progress towards her PLOF. Patient and sister agreeable to OP PT.    Rehab Prognosis: Good; patient would benefit from acute skilled PT services to address these deficits and reach maximum level of function.    Recent Surgery: * No surgery found *      Plan:     During this hospitalization, patient to be seen 4 x/week to address the identified rehab impairments via gait training, therapeutic activities, therapeutic exercises, neuromuscular re-education and progress toward the following goals:    · Plan of Care Expires:  09/01/19    Subjective     Chief Complaint: none at this time  Patient/Family Comments/goals: "I'm ready to go home. When I go to PT I want to go 3x/wk."  Pain/Comfort:  · Pain Rating 1: 0/10  · Pain Rating Post-Intervention 1: 0/10      Objective:     Communicated with RN prior to session.  Patient found supine with telemetry upon PT entry to room.     General Precautions: Standard, fall   Orthopedic Precautions:N/A   Braces: N/A     Functional Mobility:  · Bed Mobility:     · Rolling Right: independence  · Scooting: independence  · Supine to Sit: independence  · Transfers:     · Sit to Stand:  independence " with no AD  · Gait: 300ft with supervision; no gait deviations   · Balance: good balance throughout session; no LOB      AM-PAC 6 CLICK MOBILITY  Turning over in bed (including adjusting bedclothes, sheets and blankets)?: 4  Sitting down on and standing up from a chair with arms (e.g., wheelchair, bedside commode, etc.): 4  Moving from lying on back to sitting on the side of the bed?: 4  Moving to and from a bed to a chair (including a wheelchair)?: 4  Need to walk in hospital room?: 4  Climbing 3-5 steps with a railing?: 3  Basic Mobility Total Score: 23       Therapeutic Activities and Exercises:   Patient and sister on educated role and goal of PT   White board updated   Questions and concerns answered within PT scope    Patient left up in chair with all lines intact, bed alarm on, RN notified and sister present..    GOALS:   Multidisciplinary Problems     Physical Therapy Goals        Problem: Physical Therapy Goal    Goal Priority Disciplines Outcome Goal Variances Interventions   Physical Therapy Goal     PT, PT/OT Ongoing (interventions implemented as appropriate)     Description:  Goals to be met by: 19      Patient will increase functional independence with mobility by performin. Supine to sit with Pickett - MET  2. Sit to supine with Pickett - MET   3. Gait  x 150 feet with Modified Pickett using LRD, if needed  4. Ascend/descend 2 stairs with right Handrails Stand-by Assistance                        Time Tracking:     PT Received On: 19  PT Start Time: 1058     PT Stop Time: 1107  PT Total Time (min): 9 min     Billable Minutes: Gait Training 9 min    Treatment Type: Treatment  PT/PTA: PT     PTA Visit Number: 0     Laurita Morel, PT  2019

## 2019-08-05 NOTE — NURSING
Patient received discharge orders. Discharge instructions reviewed with family and patient. Patent demonstrated understanding of discharge instructions. IV and tele removed. Prescriptions given to patient. Waiting for transportation to be discharged home with family. Chepe.

## 2019-08-05 NOTE — PLAN OF CARE
Problem: Physical Therapy Goal  Goal: Physical Therapy Goal  Goals to be met by: 19      Patient will increase functional independence with mobility by performin. Supine to sit with Kinney - MET  2. Sit to supine with Kinney - MET   3. Gait  x 150 feet with Modified Kinney using LRD, if needed  4. Ascend/descend 2 stairs with right Handrails Stand-by Assistance      Outcome: Ongoing (interventions implemented as appropriate)  Patient tolerated treatment well. Established POC and goals reviewed and remain appropriate. Plan is to continue to improve patient's functional mobility capabilities.        Laurita Morel, PT, DPT  2019

## 2019-08-05 NOTE — ASSESSMENT & PLAN NOTE
40 yo previously healthy F with recent subacute L basal ganglia stroke leading to dysarthria and R-sided weakness as well as several weeks of intermittent symptoms of confusion, LLE spasticity, and emotional lability following initial stroke. Work-up notable for vessel imaging initially concerning for vasculitis, later with radiology noting Moyamoya pathology. Pt also noted to have concern for demyelinating plaque vs acute infarct on MRI Cervical spine from outside facility. Pt transferred to [facility] for IR angiogram to evaluate.     Discussed results with pt and sister at bedside; angiography with focal tapering of distal M1, consistent with early stages of Bergman bergman disease. Pt denies any PMHx of prior significant illness though sister at bedside does report that there was a period of time right after high school where the patient became very functionally debilitated, requiring assistance to feed and dress self with multiple associated hospitalizations. Patient and family report this spontaneously resolved after approximately 2 months and the only conclusion they had been given was that the pt had low potassium, was started on a daily K supplement. K level has remained WNL throughout admission without use of supplementation.   Cause for focal stenosis or Bergman bergman syndrome remains unclear - accelerated atherosclerosis possible (though pt reports no significant family history) vs underlying autoimmune disease (work-up previously performed at OSH appears negative) vs underlying hematologic disorder with abnormal labs from OSH (these will likely need repeating once pt is outside the acute phase.) Feel that CNS vasculitis is unlikely (focal stenosis, negative LP.) HIV test negative, peripheral smear showed CBC WNL - no diagnostic morphologic abnormality of red cells, white cells, or platelets seen.     Repeating CT Head and treating HA. Therapy recs upgraded to outpatient. Will consider d/c home later today if pt is  stable and with resolution of HA. D/c'd telemetry.      Antithrombotics for secondary stroke prevention:  Start ASA 325mg Daily, Clopidogrel 75mg Daily  Statins for secondary stroke prevention/HLD: Atorvastatin 80 mg qd  Aggressive risk factor modification: HLD, Diet, Exercise  Rehab efforts: PT/OT/SLP - Dispo rehab  Diagnostics ordered/pending: None   VTE prophylaxis: Heparin 5000 units SQ every 8 hours  place SCDs (ok for no compression stockings per staff)  BP parameters: Infarct: SBP goal 140-160, encouraged pt toward aggressive hydration for adequate cerebral perfusion

## 2019-08-05 NOTE — PROGRESS NOTES
[facility]  Vascular Neurology  Comprehensive Stroke Center  Progress Note    Assessment/Plan:     * Thrombotic stroke involving left middle cerebral artery  40 yo previously healthy F with recent subacute L basal ganglia stroke leading to dysarthria and R-sided weakness as well as several weeks of intermittent symptoms of confusion, LLE spasticity, and emotional lability following initial stroke. Work-up notable for vessel imaging initially concerning for vasculitis, later with radiology noting Moyamoya pathology. Pt also noted to have concern for demyelinating plaque vs acute infarct on MRI Cervical spine from outside facility. [place] [facility] [place] to evaluate.     Discussed results with pt and sister at bedside; angiography with focal tapering of distal M1, consistent with early stages of Bergman bergman disease. Pt denies any PMHx of prior significant illness though sister at bedside does report that there was a period of time right after high school where the patient became very functionally debilitated, requiring assistance to feed and dress self with multiple associated hospitalizations. Patient and family report this spontaneously resolved after approximately 2 months and the only conclusion they had been given was that the pt had low potassium, was started on a daily K supplement. K level has remained WNL throughout admission without use of supplementation.   Cause for focal stenosis or Bergman bergman syndrome remains unclear - accelerated atherosclerosis possible (though pt reports no significant family history) vs underlying autoimmune disease (work-up previously performed at OSH appears negative) vs underlying hematologic disorder with abnormal labs from OSH (these will likely need repeating once pt is outside the acute phase.) Feel that CNS vasculitis is unlikely (focal stenosis, negative LP.) HIV test negative, peripheral smear showed CBC WNL - no diagnostic morphologic  "abnormality of red cells, white cells, or platelets seen.     Repeating CT Head and treating HA. Therapy recs upgraded to outpatient. Will consider d/c home later today if pt is stable and with resolution of HA. D/c'd telemetry.      Antithrombotics for secondary stroke prevention:  Start ASA 325mg Daily, Clopidogrel 75mg Daily  Statins for secondary stroke prevention/HLD: Atorvastatin 80 mg qd  Aggressive risk factor modification: HLD, Diet, Exercise  Rehab efforts: PT/OT/SLP - Dispo rehab  Diagnostics ordered/pending: None   VTE prophylaxis: Heparin 5000 units SQ every 8 hours  place SCDs (ok for no compression stockings per staff)  BP parameters: Infarct: SBP goal 140-160, encouraged pt toward aggressive hydration for adequate cerebral perfusion    Nausea  Pt nausea 2/2 "nerves" as well as reported sensitivity to strong scents.  +nausea but no vomiting  Continue PRN Zofran    Headache  Had previously resolved but pt c/o returned HA on 8/5  Ordered NS bolus, IV Reglan x 1, and repeat CT Head to further assess.   Will consider d/c home later today if pt is stable and with resolution of HA.  - prn acetaminophen, prn topical lidocaine gel  - Had been taking Fioricet outpatient, need to limit to < 3 days per week to avoid rebound headache    Reactive depression  Pt becomes very emotional on multiple occasions during interviews  Sister states pt is overwhelmed by the struggles of her illness over recent months and that she is ready to be out of the hospital/go home.  Pt amenable to SSRI; started Prozac 20mg Daily. Later switched to qHS timing as pt states it makes her feel "hazy".   Continuing to monitor.    Unsteady gait  -S/p L basal ganglia infarct  PT/OT to evaluate and treat  Pt has been ambulating in halls with sister. PT/OT re-evaluated pt and now feel that she is appropriate for outpatient therapy rather than inpatient rehab.     Cytotoxic cerebral edema  Upon review of brain imaging, small area of cytotoxic " "cerebral edema identified in the territory of the Left middle cerebral artery. There is not associated mass effect.   We will continue to monitor the patients clinical exam for any worsening of symptoms which may indicate expansion of the stroke or the area of edema resulting in such clinical change.   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    Dysarthria  -S/p L basal ganglia infarct  SLP to evaluate and treat  Dispo outpatient therapy    Spasticity  -Continue baclofen 5 mg bid prn muscle spasms, started at OSH.  Titrate up as needed.  Pt denies these complaints on 8/3; Will continue to monitor.         08/01/19:  Transfer from [facility]  8/2- received angiogram   8/3: Discussed angiogram results with pt and sister at bedside; started SQH, ordered HIV and peripheral smear. Pt c/o nausea, dizziness; ordered NS bolus and encouraged drinking water in order to increase cerebral perfusion, BP. Pt very emotional on interview today; started Prozac.   8/4: Pt c/o nausea this AM, no vomitting; received PRN Zofran. She continues to hydrate for soft BPs. Per lab, pt's peripheral blood smear will be interpreted by a pathologist tomorrow and result will be listed under 8/3 CBC.  8/5: Pt c/o returned HA today; ordered NS bolus, IV Reglan x 1, repeat CT Head. Therapy teams re-evaluated pt and now feel that she is appropriate for outpatient services rather than inpatient rehab. Will consider d/c home later today if pt is stable and with resolution of HA. Switched Prozac to qHS as pt states it makes her feel "hazy". D/c'd telemetry.    STROKE DOCUMENTATION        NIH Scale:  1a. Level of Consciousness: 0-->Alert, keenly responsive  1b. LOC Questions: 0-->Answers both questions correctly  1c. LOC Commands: 0-->Performs both tasks correctly  2. Best Gaze: 0-->Normal  3. Visual: 0-->No visual loss  4. Facial Palsy: 0-->Normal symmetrical movements  5a. Motor Arm, Left: 0-->No drift, " "limb holds 90 (or 45) degrees for full 10 secs  5b. Motor Arm, Right: 0-->No drift, limb holds 90 (or 45) degrees for full 10 secs  6a. Motor Leg, Left: 0-->No drift, leg holds 30 degree position for full 5 secs  6b. Motor Leg, Right: 0-->No drift, leg holds 30 degree position for full 5 secs  7. Limb Ataxia: 0-->Absent  8. Sensory: 0-->Normal, no sensory loss  9. Best Language: 0-->No aphasia, normal  10. Dysarthria: 1-->Mild-to-moderate dysarthria, patient slurs at least some words and, at worst, can be understood with some difficulty  11. Extinction and Inattention (formerly Neglect): 0-->No abnormality  Total (NIH Stroke Scale): 1       Modified [place] Score: 2  Little Coma Scale:    ABCD2 Score:    [id number] Score:   HAS -BLED Score:   ICH Score:   Hunt & Sarabia Classification:      Hemorrhagic change of an Ischemic Stroke: Does this patient have an ischemic stroke with hemorrhagic changes? No     Neurologic Chief Complaint: R hemiparesis, slurred speech -- L basal ganglia stroke    Subjective:     Interval History: Patient is seen for follow-up neurological assessment and treatment recommendations: ANNE-MARIE. Pt c/o returned HA today; ordered NS bolus, IV Reglan x 1, repeat CT Head. Therapy teams re-evaluated pt and now feel that she is appropriate for outpatient services rather than inpatient rehab. Will consider d/c home later today if pt is stable and with resolution of HA. Switched Prozac to qHS as pt states it makes her feel "hazy". D/c'd telemetry.    HPI, Past Medical, Family, and Social History remains the same as documented in the initial encounter.     Review of Systems   Constitutional: Negative for fatigue and fever.   Gastrointestinal: Positive for nausea. Negative for vomiting.   Genitourinary: Positive for pelvic pain. Negative for menstrual problem.   Musculoskeletal: Negative for arthralgias and myalgias.   Neurological: Positive for speech difficulty, weakness and headaches. Negative for " dizziness.   Psychiatric/Behavioral: Negative for agitation and confusion.     Scheduled Meds:   aspirin  325 mg Oral Daily    atorvastatin  80 mg Oral Daily    clopidogrel  75 mg Oral Daily    FLUoxetine  20 mg Oral QHS    heparin (porcine)  5,000 Units Subcutaneous Q8H    sodium chloride 0.9%  1,000 mL Intravenous Once     Continuous Infusions:  PRN Meds:acetaminophen, baclofen, lidocaine, ondansetron, ramelteon, sodium chloride 0.9%    Objective:     Vital Signs (Most Recent):  Temp: 98.4 °F (36.9 °C) (08/05/19 1111)  Pulse: 73 (08/05/19 1127)  Resp: 20 (08/05/19 1111)  BP: 115/72 (08/05/19 1111)  SpO2: 99 % (08/05/19 1111)  BP Location: Left arm    Vital Signs Range (Last 24H):  Temp:  [96.7 °F (35.9 °C)-99 °F (37.2 °C)]   Pulse:  [62-79]   Resp:  [16-20]   BP: (102-118)/(64-74)   SpO2:  [97 %-100 %]   BP Location: Left arm    Physical Exam   Constitutional: She is oriented to person, place, and time. She appears well-developed and well-nourished. No distress.   HENT:   Head: Normocephalic and atraumatic.   Eyes: Conjunctivae and EOM are normal.   Cardiovascular: Normal rate.   Pulmonary/Chest: Effort normal. No respiratory distress.   Abdominal: She exhibits no distension. There is tenderness (on palpation of RLQ/suprapubic area). There is no rebound and no guarding.   Musculoskeletal: She exhibits no edema, tenderness or deformity.   Neurological: She is alert and oriented to person, place, and time.   Psychiatric: She has a normal mood and affect. She is attentive.       Neurological Exam:   LOC: alert  Attention Span: Good   Language: No aphasia  Articulation: Dysarthria  Orientation: Person, Place, Time   Visual Fields: Full  EOM (CN III, IV, VI): Full/intact  Facial Movement (CN VII): Symmetric facial expression    Motor: Arm left  Normal 5/5  Leg left  Normal 5/5  Arm right  Paresis: 4/5  Leg right Normal 5/5  Sensation: Intact to light touch, temperature and vibration  Tone: Normal tone  throughout    Laboratory:  CMP:   No results for input(s): GLUCOSE, CALCIUM, ALBUMIN, PROT, NA, K, CO2, CL, BUN, CREATININE, ALKPHOS, ALT, AST, BILITOT in the last 24 hours.  BMP:   Recent Labs   Lab 08/03/19  0353      K 3.7      CO2 24   BUN 11   CREATININE 0.8   CALCIUM 8.9     CBC:   Recent Labs   Lab 08/03/19  1311   WBC 7.10   RBC 4.27   HGB 12.4   HCT 38.7      MCV 91   MCH 29.0   MCHC 32.0     Lipid Panel:   No results for input(s): CHOL, LDLCALC, HDL, TRIG in the last 168 hours.  Coagulation:   Recent Labs   Lab 08/02/19  0518   INR 0.9   APTT <21.0     Platelet Aggregation Study: No results for input(s): PLTAGG, PLTAGINTERP, PLTAGREGLACO, ADPPLTAGGREG in the last 168 hours.  Hgb A1C:   No results for input(s): HGBA1C in the last 168 hours.      Diagnostic Results     Brain Imaging     CT Head 8/5/19 pending    07/27/19 MRI Brain w/ w/o contrast:    1. Intermediate restricted diffusion signal in the left basal ganglia with faint enhancement and low associated ADC signal is most consistent with a subacute infarct     07/28/19 MRI C, T spine demyelinating w/ w/o contrast:  1.  There is a nonspecific, nonenhancing faint high STIR signal focus in the left paramedian ventral aspect of the medulla. This could reflect a demyelinating plaque or an acute lacunar infarction.  2. No cord signal abnormality in the thoracic spine.  3. There is a focal left subarticular disc protrusion causing mild narrowing of the left lateral recess of the canal.     07/31/19 MRI Brain w/ w/o contrast:    1. Likely old left posterior putaminal infarct with encephalomalacia.  Subacute infarct of the caudate head and anterior and extreme posterior left putamen.  2. Non-specific slight increased tonsillar ectopia with mild crowding at the foramen magnum compared to study from 07/27/2019 preserved rounding of the inferior tonsillar tips.  3. Stable subtle nonspecific sulcal FLAIR signal abnormality in the occipital lobes  bilaterally.      Vessel Imaging     IR Cerebral Angiogram 8/2/19  Abnormal left M1 segment which tapers to high-grade stenosis.  There are hypertrophied lenticulostriate moyamoya type collaterals from the left M1 segment.  Both anterior cerebral arteries fill from the right, and there is leptomeningeal flow from left PENELOPE to left MCA territory.    MRA Brain WO Contrast 7/31/19  Small left MCA with moyamoya physiology on the left.    07/27/19 CTA head and neck:  1.  There is marked narrowing of the distal portion of the M1 segment of the left middle cerebral artery (see for example sequence 13, image 46) and no visible perfusion of the M2 and M3 branches of the left middle cerebral artery.  2. The left anterior cerebral artery is perfused by the right internal carotid artery via the anterior communicating artery.  This is likely a normal variant.  3. There is no other cervical carotid or intracranial vascular abnormality.      Cardiac Imaging     07/29/19 TTE with CFD, Shunt Study:  · Normal left ventricular systolic function. The estimated ejection fraction is 55%  · Normal LV diastolic function.  · Normal right ventricular systolic function.  · The estimated PA systolic pressure is 17 mm Hg  · Study is negative for shunt.      Charmaine You PA-C  Comprehensive Stroke Center  Department of Vascular Neurology   [facility]

## 2019-08-05 NOTE — PLAN OF CARE
Problem: SLP Goal  Goal: SLP Goal  Speech-Language Pathology Goals  Goals to be met by 8/9/19  1. Patient will tolerate a REGULAR DIET/THIN LIQUIDS with no s/s of aspiration.   2. Patient will participate in ongoing assessment of high level cognitive-linguistic skills.   3. Patient will independently recall and utilize three clear speech strategies during session.  4. Patient will complete dysarthria tasks x10 per session with min assist.    Patient seen for ongoing speech therapy and monitoring of diet tolerance.    Gage Pierre CCC-SLP  Speech-Language Pathology  Pager: [phone]

## 2019-08-05 NOTE — ASSESSMENT & PLAN NOTE
Had previously resolved but pt c/o returned HA on 8/5  Ordered NS bolus, IV Reglan x 1, and repeat CT Head to further assess.   Will consider d/c home later today if pt is stable and with resolution of HA.  - prn acetaminophen, prn topical lidocaine gel  - Had been taking Fioricet outpatient, need to limit to < 3 days per week to avoid rebound headache

## 2019-08-05 NOTE — PT/OT/SLP DISCHARGE
Occupational Therapy Discharge Summary    Ally Ponce  MRN: [id number]   Principal Problem: Thrombotic stroke involving left middle cerebral artery      Patient Discharged from acute Occupational Therapy on 8/5.  Please refer to prior OT note dated 8/5  for functional status.    Assessment:      Patient appropriate for care in another setting.    Objective:     GOALS:   Multidisciplinary Problems     Occupational Therapy Goals        Problem: Occupational Therapy Goal    Goal Priority Disciplines Outcome Interventions   Occupational Therapy Goal     OT, PT/OT     Description:  Goals set 8/2 to be addressed for 7 days with expiration date, 8/9:  Patient will increase functional independence with ADLs by performing:    Patient will demonstrate stand pivot transfers with modified independence.   Not met  Patient will demonstrate grooming while standing with modified independence.   Not met  Patient will demonstrate upper body dressing with modified independence while seated EOB.   Not met  Patient will demonstrate lower body dressing with modified independence while seated EOB.   Not met  Patient will demonstrate toileting with modified independence.   Not met  Patient will demonstrate bathing while seated EOB with modified independence.   Not met  Patient will demonstrate independence with HEP for right UE weight bearing.    Not met  Patient's family / caregiver will demonstrate independence and safety with assisting patient with self-care skills and functional mobility.     Not met  Patient and/or patient's family will verbalize understanding of stroke prevention guidelines, personal risk factors and stroke warning signs via teachback method.  Not met                           Reasons for Discontinuation of Therapy Services  Transfer to alternate level of care.      Plan:     Patient Discharged to: Outpatient Therapy Services    ELIJAH Monet  8/5/2019

## 2019-08-05 NOTE — PLAN OF CARE
08/05/2019      Ally Ponce  [address]  [place] [id number]          Vascular Neurology Dept.  [facility] [address] [facility] [address] [address]  [phone] after hours  [phone] fax Diagnosis:  Thrombotic stroke involving left middle cerebral artery                         Debility          Please evaluate and treat for all PT, OT, and SLP needs. Thank you,                    __________________________  Nancy Heller NP  08/05/2019

## 2019-08-05 NOTE — ASSESSMENT & PLAN NOTE
"Pt becomes very emotional on multiple occasions during interviews  Sister states pt is overwhelmed by the struggles of her illness over recent months and that she is ready to be out of the hospital/go home.  Pt amenable to SSRI; started Prozac 20mg Daily. Later switched to qHS timing as pt states it makes her feel "hazy".   Continuing to monitor.  "

## 2019-08-05 NOTE — PLAN OF CARE
Problem: Adult Inpatient Plan of Care  Goal: Plan of Care Review  Outcome: Ongoing (interventions implemented as appropriate)     08/05/19 0521   Plan of Care Review   Plan of Care Reviewed With patient;sibling   Progress improving   Plan of care reviewed with pt and sister. Pt and sister voiced understanding. Pt AAO X 4. Pt denies any c/o during the shift other than she is ready to go home, and that the prozac makes her sleepy. Advised her if she goes home on the medication then it may be best for her to take at night. No apparent distress noted. Fall precautions maintained. Bed in lowest position, locked, call light within reach. Side rails up x's 2 with slip resistant socks on. Pt on telemetry running NSR. No true red alarms or ectopy throughout shift.

## 2019-08-05 NOTE — ASSESSMENT & PLAN NOTE
"Pt nausea 2/2 "nerves" as well as reported sensitivity to strong scents.  +nausea but no vomiting  Continue PRN Zofran  "

## 2019-08-05 NOTE — PLAN OF CARE
08/05/19 1624   Final Note   Assessment Type Final Discharge Note   Anticipated Discharge Disposition Home   Right Care Referral Info   Post Acute Recommendation No Care

## 2019-08-05 NOTE — PT/OT/SLP PROGRESS
"Occupational Therapy   Treatment    Name: Ally Ponce  MRN: [id number]  Admitting Diagnosis:  Thrombotic stroke involving left middle cerebral artery       Recommendations:     Discharge Recommendations: outpatient OT  Discharge Equipment Recommendations:  none  Barriers to discharge:  None    Assessment:     Ally Ponce is a 39 y.o. female with a medical diagnosis of Thrombotic stroke involving left middle cerebral artery.  She presents with performance deficits affecting function are weakness, impaired functional mobilty, impaired cognition, decreased safety awareness, impaired coordination, gait instability, decreased coordination, impaired balance, decreased upper extremity function, impaired self care skills, impaired fine motor.     Rehab Prognosis:  Good; patient would benefit from acute skilled OT services to address these deficits and reach maximum level of function.       Plan:     Patient to be seen 4 x/week to address the above listed problems via self-care/home management, neuromuscular re-education, sensory integration, therapeutic activities, therapeutic exercises  · Plan of Care Expires: 08/30/19  · Plan of Care Reviewed with: patient    Subjective   Patient:  "I am ready to go home."  Sister:  "She has improved a lot since Friday.  She walked by herself to the waiting room and she got up 3x overnight to go to the bathroom."  Pain/Comfort:  · Pain Rating 1: 0/10  · Pain Rating Post-Intervention 1: 0/10    Objective:     Communicated with: Nurse prior to session.  Patient found supine with telemetry, peripheral IV upon OT entry to room.    General Precautions: Standard, fall   Orthopedic Precautions:N/A   Braces: N/A     Occupational Performance:     Bed Mobility:    · Patient completed Rolling/Turning to Left with  modified independence  · Patient completed Rolling/Turning to Right with modified independence  · Patient completed Scooting/Bridging with modified independence  · Patient completed Supine " to Sit with modified independence  · Patient completed Sit to Supine with modified independence     Functional Mobility/Transfers:  · Patient completed Sit <> Stand Transfer with supervision  with  no assistive device   · Patient completed Bed <> Chair Transfer using Stand Pivot technique with supervision with no assistive device    Activities of Daily Living:  · Grooming: supervision while standing  · Upper Body Dressing: supervision    · Lower Body Dressing: supervision    · Toileting: supervision      [facility] 6 Click ADL: 20    Treatment & Education:  Patient/ Family education provided for stroke warning signs, prevention guidelines and personal risk factors.  Patient/ Family verbalizing understanding via teach back method.  Patient education provided on role of OT and need for outpt OT upon discharge.   Patient and family instructed on need to call for assistance for toileting needs and when getting up.  Continued education, patient/ family training recommended.   Addressed right UE weight bearing.  Patient's functional status and disposition recommendation discussed with stroke team in daily rounds.  White board updated in patient's room.  OT asked if there were any other questions; patient/ family had no further questions.    Patient left supine with all lines intact and call button in reachEducation:      GOALS:   Multidisciplinary Problems     Occupational Therapy Goals        Problem: Occupational Therapy Goal    Goal Priority Disciplines Outcome Interventions   Occupational Therapy Goal     OT, PT/OT     Description:  Goals set 8/2 to be addressed for 7 days with expiration date, 8/9:  Patient will increase functional independence with ADLs by performing:    Patient will demonstrate stand pivot transfers with modified independence.   Not met  Patient will demonstrate grooming while standing with modified independence.   Not met  Patient will demonstrate upper body dressing with modified independence while  seated EOB.   Not met  Patient will demonstrate lower body dressing with modified independence while seated EOB.   Not met  Patient will demonstrate toileting with modified independence.   Not met  Patient will demonstrate bathing while seated EOB with modified independence.   Not met  Patient will demonstrate independence with HEP for right UE weight bearing.    Not met  Patient's family / caregiver will demonstrate independence and safety with assisting patient with self-care skills and functional mobility.     Not met  Patient and/or patient's family will verbalize understanding of stroke prevention guidelines, personal risk factors and stroke warning signs via teachback method.  Not met                           Time Tracking:     OT Date of Treatment: 08/05/19  OT Start Time: 0638  OT Stop Time: 0702  OT Total Time (min): 24 min    Billable Minutes:Self Care/Home Management 24    ELIJAH Monet  8/5/2019

## 2019-08-05 NOTE — PT/OT/SLP PROGRESS
Speech Language Pathology Treatment    Patient Name:  Ally Ponce   MRN:  [id number]  Admitting Diagnosis: Thrombotic stroke involving left middle cerebral artery    Recommendations:                 General Recommendations:  Speech/language therapy  Diet recommendations:  Regular, Liquid Diet Level: Thin   Aspiration Precautions: Standard aspiration precautions   General Precautions: Standard, fall  Communication strategies:  none    Subjective     Patient awake and alert during session  Communicated with nurse prior to session    Pain/Comfort:  Pain Rating 1: 0/10  · Pain Rating Post-Intervention 1: 0/10    Objective:     Has the patient been evaluated by SLP for swallowing?   Yes  Keep patient NPO? No   Current Respiratory Status: room air      Patient seen for ongoing monitoring of diet tolerance and speech therapy. She was sitting up on couch with her sister during session. Patient and sister continued to report that her only deficit from a ST perspective was her speech. Her speech continued to be very rapid with substitution errors and imprecise articulation. Patient independently recalled one clear speech strategy, increasing to two with max assist. SLP educated her regarding further clear speech strategies. Patient verbalized understanding, but demonstrated reduced carryover of strategies to remainder of session. When consistently cued by SLP to utilize strategies, patient demonstrated a good ability that improved her speech intelligibility. During speech task, patient was independently 75% accurate with her speech, increasing to ~90% with mod cueing. Her rate of speech and prosody did not improve as significantly as her intelligibility with consistent cueing.  During PO trials, she tolerated thin liquids x6 (via straw) and solids x3 (chicken sandwich) with no overt signs of aspiration. SLP educated patient and sister regarding POC both in hospital and following discharge and they verbalized understanding.      Assessment:     Ally Ponce is a 39 y.o. female with an SLP diagnosis of Dysarthria.      Goals:   Multidisciplinary Problems     SLP Goals        Problem: SLP Goal    Goal Priority Disciplines Outcome   SLP Goal     SLP    Description:  Speech-Language Pathology Goals  Goals to be met by 8/9/19  1. Patient will tolerate a REGULAR DIET/THIN LIQUIDS with no s/s of aspiration.   2. Patient will participate in ongoing assessment of high level cognitive-linguistic skills.   3. Patient will independently recall and utilize three clear speech strategies during session.  4. Patient will complete dysarthria tasks x10 per session with min assist.                     Plan:     · Patient to be seen:  3 x/week   · Plan of Care expires:  09/01/19  · Plan of Care reviewed with:  patient, daughter   · SLP Follow-Up:  Yes       Discharge recommendations:  outpatient speech therapy   Barriers to Discharge:  None    Time Tracking:     SLP Treatment Date:   08/05/19  Speech Start Time:  1132  Speech Stop Time:  1148     Speech Total Time (min):  16 min    Billable Minutes: Speech Therapy Individual 8 and Treatment Swallowing Dysfunction 8    Gage Pierre CCC-SLP  Speech-Language Pathology  Pager: [phone]   08/05/2019

## 2019-08-05 NOTE — PLAN OF CARE
08/05/19 1608   Post-Acute Status   Post-Acute Authorization Other   Other Status No Post-Acute Service Needs

## 2019-08-05 NOTE — SUBJECTIVE & OBJECTIVE
"Neurologic Chief Complaint: R hemiparesis, slurred speech -- L basal ganglia stroke    Subjective:     Interval History: Patient is seen for follow-up neurological assessment and treatment recommendations: ANNE-MARIE. Pt c/o returned HA today; ordered NS bolus, IV Reglan x 1, repeat CT Head. Therapy teams re-evaluated pt and now feel that she is appropriate for outpatient services rather than inpatient rehab. Will consider d/c home later today if pt is stable and with resolution of HA. Switched Prozac to qHS as pt states it makes her feel "hazy". D/c'd telemetry.    HPI, Past Medical, Family, and Social History remains the same as documented in the initial encounter.     Review of Systems   Constitutional: Negative for fatigue and fever.   Gastrointestinal: Positive for nausea. Negative for vomiting.   Genitourinary: Positive for pelvic pain. Negative for menstrual problem.   Musculoskeletal: Negative for arthralgias and myalgias.   Neurological: Positive for speech difficulty, weakness and headaches. Negative for dizziness.   Psychiatric/Behavioral: Negative for agitation and confusion.     Scheduled Meds:   aspirin  325 mg Oral Daily    atorvastatin  80 mg Oral Daily    clopidogrel  75 mg Oral Daily    FLUoxetine  20 mg Oral QHS    heparin (porcine)  5,000 Units Subcutaneous Q8H    sodium chloride 0.9%  1,000 mL Intravenous Once     Continuous Infusions:  PRN Meds:acetaminophen, baclofen, lidocaine, ondansetron, ramelteon, sodium chloride 0.9%    Objective:     Vital Signs (Most Recent):  Temp: 98.4 °F (36.9 °C) (08/05/19 1111)  Pulse: 73 (08/05/19 1127)  Resp: 20 (08/05/19 1111)  BP: 115/72 (08/05/19 1111)  SpO2: 99 % (08/05/19 1111)  BP Location: Left arm    Vital Signs Range (Last 24H):  Temp:  [96.7 °F (35.9 °C)-99 °F (37.2 °C)]   Pulse:  [62-79]   Resp:  [16-20]   BP: (102-118)/(64-74)   SpO2:  [97 %-100 %]   BP Location: Left arm    Physical Exam   Constitutional: She is oriented to person, place, and time. " She appears well-developed and well-nourished. No distress.   HENT:   Head: Normocephalic and atraumatic.   Eyes: Conjunctivae and EOM are normal.   Cardiovascular: Normal rate.   Pulmonary/Chest: Effort normal. No respiratory distress.   Abdominal: She exhibits no distension. There is tenderness (on palpation of RLQ/suprapubic area). There is no rebound and no guarding.   Musculoskeletal: She exhibits no edema, tenderness or deformity.   Neurological: She is alert and oriented to person, place, and time.   Psychiatric: She has a normal mood and affect. She is attentive.       Neurological Exam:   LOC: alert  Attention Span: Good   Language: No aphasia  Articulation: Dysarthria  Orientation: Person, Place, Time   Visual Fields: Full  EOM (CN III, IV, VI): Full/intact  Facial Movement (CN VII): Symmetric facial expression    Motor: Arm left  Normal 5/5  Leg left  Normal 5/5  Arm right  Paresis: 4/5  Leg right Normal 5/5  Sensation: Intact to light touch, temperature and vibration  Tone: Normal tone throughout    Laboratory:  CMP:   No results for input(s): GLUCOSE, CALCIUM, ALBUMIN, PROT, NA, K, CO2, CL, BUN, CREATININE, ALKPHOS, ALT, AST, BILITOT in the last 24 hours.  BMP:   Recent Labs   Lab 08/03/19  0353      K 3.7      CO2 24   BUN 11   CREATININE 0.8   CALCIUM 8.9     CBC:   Recent Labs   Lab 08/03/19  1311   WBC 7.10   RBC 4.27   HGB 12.4   HCT 38.7      MCV 91   MCH 29.0   MCHC 32.0     Lipid Panel:   No results for input(s): CHOL, LDLCALC, HDL, TRIG in the last 168 hours.  Coagulation:   Recent Labs   Lab 08/02/19  0518   INR 0.9   APTT <21.0     Platelet Aggregation Study: No results for input(s): PLTAGG, PLTAGINTERP, PLTAGREGLACO, ADPPLTAGGREG in the last 168 hours.  Hgb A1C:   No results for input(s): HGBA1C in the last 168 hours.      Diagnostic Results     Brain Imaging     CT Head 8/5/19 pending    07/27/19 MRI Brain w/ w/o contrast:    1. Intermediate restricted diffusion signal in  the left basal ganglia with faint enhancement and low associated ADC signal is most consistent with a subacute infarct     07/28/19 MRI C, T spine demyelinating w/ w/o contrast:  1.  There is a nonspecific, nonenhancing faint high STIR signal focus in the left paramedian ventral aspect of the medulla. This could reflect a demyelinating plaque or an acute lacunar infarction.  2. No cord signal abnormality in the thoracic spine.  3. There is a focal left subarticular disc protrusion causing mild narrowing of the left lateral recess of the canal.     07/31/19 MRI Brain w/ w/o contrast:    1. Likely old left posterior putaminal infarct with encephalomalacia.  Subacute infarct of the caudate head and anterior and extreme posterior left putamen.  2. Non-specific slight increased tonsillar ectopia with mild crowding at the foramen magnum compared to study from 07/27/2019 preserved rounding of the inferior tonsillar tips.  3. Stable subtle nonspecific sulcal FLAIR signal abnormality in the occipital lobes bilaterally.      Vessel Imaging     IR Cerebral Angiogram 8/2/19  Abnormal left M1 segment which tapers to high-grade stenosis.  There are hypertrophied lenticulostriate moyamoya type collaterals from the left M1 segment.  Both anterior cerebral arteries fill from the right, and there is leptomeningeal flow from left PENELOPE to left MCA territory.    MRA Brain WO Contrast 7/31/19  Small left MCA with moyamoya physiology on the left.    07/27/19 CTA head and neck:  1.  There is marked narrowing of the distal portion of the M1 segment of the left middle cerebral artery (see for example sequence 13, image 46) and no visible perfusion of the M2 and M3 branches of the left middle cerebral artery.  2. The left anterior cerebral artery is perfused by the right internal carotid artery via the anterior communicating artery.  This is likely a normal variant.  3. There is no other cervical carotid or intracranial vascular  abnormality.      Cardiac Imaging     07/29/19 TTE with CFD, Shunt Study:  · Normal left ventricular systolic function. The estimated ejection fraction is 55%  · Normal LV diastolic function.  · Normal right ventricular systolic function.  · The estimated PA systolic pressure is 17 mm Hg  · Study is negative for shunt.

## 2019-08-06 NOTE — ASSESSMENT & PLAN NOTE
Upon review of brain imaging, small area of cytotoxic cerebral edema identified in the territory of the Left middle cerebral artery. There is not associated mass effect.   The patients clinical exam remained without any worsening of symptoms which may indicate expansion of the stroke or the area of edema resulting in such clinical change.   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

## 2019-08-06 NOTE — ASSESSMENT & PLAN NOTE
"Pt nausea 2/2 "nerves" as well as reported sensitivity to strong scents.  +nausea but no vomiting  Continue PRN Zofran at d/c  "

## 2019-08-06 NOTE — ASSESSMENT & PLAN NOTE
HA had previously resolved during but pt c/o returned HA on 8/5  Ordered NS bolus, IV Reglan x 1, and repeat CT Head to further assess.   Repeat CT stable. Pt later reporting resolution of HA.  - prn acetaminophen  - Had been taking Fioricet outpatient, instructed pt to limit to < 3 days per week to avoid rebound headache.  Stable for d/c

## 2019-08-06 NOTE — ASSESSMENT & PLAN NOTE
- Continue Baclofen 5 mg bid prn muscle spasms, started at OSH. Titrate up as needed.  Pt denying further complaints in recent days.  Per PCP

## 2019-08-06 NOTE — ASSESSMENT & PLAN NOTE
"Pt becomes very emotional on multiple occasions during interviews  Sister states pt is overwhelmed by the struggles of her illness over recent months and that she is ready to be out of the hospital/go home.  Pt amenable to SSRI; started Prozac 20mg Daily. Later switched to qHS timing as pt states it makes her feel "hazy".   "

## 2019-08-06 NOTE — ASSESSMENT & PLAN NOTE
38 yo previously healthy F with recent subacute L basal ganglia stroke leading to dysarthria and R-sided weakness as well as several weeks of intermittent symptoms of confusion, LLE spasticity, and emotional lability following initial stroke. Work-up notable for vessel imaging initially concerning for vasculitis, later with radiology noting Moyamoya pathology. Pt also noted to have concern for demyelinating plaque vs acute infarct on MRI Cervical spine from outside facility. Pt transferred to [facility] for IR angiogram to evaluate.     Discussed with pt and sister at bedside; angiography with focal tapering of distal M1, consistent with early stages of Bergman bergman disease. Pt denies any hx of prior significant illness though sister reports there was a period right after high school where the patient became very functionally debilitated, requiring assistance to feed and dress self with multiple associated hospitalizations. This spontaneously resolved after ~2 months and the only conclusion they had been given from work-up was that the pt had low potassium for which a daily K supplement was started. (K level remained WNL throughout admission without use of supplementation.)   Cause for focal stenosis or Bergman bergman syndrome remains unclear - accelerated atherosclerosis possible (though pt reports no significant family history) vs underlying autoimmune disease (work-up previously performed at OSH appears negative thus far) vs underlying hematologic disorder with abnormal labs from OSH (some will likely need repeating once pt is outside the acute phase.) Feel that CNS vasculitis is unlikely (focal stenosis, negative LP.) HIV test negative, peripheral smear showed CBC WNL.     Pt was stable for discharge home following resolution of AM HA. She will follow up with Dr. Mercado in clinic in 6-8 weeks.      Antithrombotics for secondary stroke prevention:  Start ASA 325mg Daily, Clopidogrel 75mg Daily  Statins for secondary stroke  prevention/HLD: Atorvastatin 80 mg qd  Aggressive risk factor modification: HLD, Diet, Exercise  Rehab efforts: Outpatient therapy  BP parameters: Infarct: SBP goal 140-160, encouraged pt toward aggressive hydration for adequate cerebral perfusion

## 2019-08-06 NOTE — DISCHARGE SUMMARY
[facility]  Vascular Neurology  Comprehensive Stroke Center  Discharge Summary     Summary:     Admit Date: 8/1/2019  2:54 PM    Discharge Date and Time: 8/5/2019  5:45 PM    Attending Physician: Caridad Benjamin MD    Discharge Provider: Charmaine You PA-C    History of Present Illness: 40 yo F who was previously healthy until June 21st stroke presenting with R hemiparesis and speech difficulty, found to have L basal ganglia stroke.  Started on DAPT and had some PT/OT/SLP.  Began to have some improvement in symptoms but then noted LLE shaking/cramping on 7/1/19, admitted at [facility] (records at bedside) with negative work up for LLE pathology, started on baclofen.  Patient states that she has been having headaches over the past month or two, had been taking NSAIDs, now has prn Fioricet. During this admission to [facility] for the LLE symptoms an MRI Brain was repeated with no new strokes.  CTA was also done and was concerning for vasculitis.  She was started on 5 d IV steroids with some further improvement in R-sided weakness and speech.  Sent home, continued to participate in PT/OT/SLP.  July 27th, patient was brought to [facility] for worsening memory, with family thinking they were part of the [facility] system and would maybe be able to determine what was causing patient's strokes more than [facility].  Another MRI Brain was done which showed only the L BG stroke, but another CTA showed significant narrowing of the distal L M1 and radiology report suggested vasculitis.  Headaches have been present throughout.  [facility] Neurologist started patient on acyclovir empirically while awaiting LP results to protect her from HSV vasculitis.  Noted to have PBA at [facility].  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  Plan for transfer to [facility] for IR angiogram to further work up question of vasculitis vs  Moyamoya vs RCVS.  Patient is seen today at bedside with her niece.  [facility] records are at bedside for review as well.  Both have difficulty recalling specific medications but seem to understand the general plan.  See below for work up from OSH.  Patient feels well today, ASA and plavix were held at OSH for LP.  Can hold for IR angiogram with plan for am procedure and resuming antiplatelets afterward.      Hospital Course (synopsis of major diagnoses, care, treatment, and services provided during the course of the hospital stay): Ms. Ally Ponce was admitted to Vascular Neurology for acute stroke workup. Stroke etiology suspected to be vasculitis at this time. Angiography findings demonstrated focal tapering of the distal M1; no clear underlying cause upon review of rheumatology panel, CSF studies, etc. obtained at outside facility. 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 Pt c/o intermittent headaches; encouraged good PO hydration and strict medication compliance at home.  Patient discharged with recommendations for outpatient therapy. Family at bedside amenable to plan.     Patient with improvement in stroke symptoms since admission. Inpatient acute stroke work up completed and patient stable for discharge. Please see all appropriate medication changes, imaging results, and necessary follow-up below.    Stroke Etiology: Evident Other/Uncommon Causes     STROKE DOCUMENTATION         NIH Scale:  Interval: 7 days or at discharge (whichever comes first)  1a. Level of Consciousness: 0-->Alert, keenly responsive  1b. LOC Questions: 0-->Answers both questions correctly  1c. LOC Commands: 0-->Performs both tasks correctly  2. Best Gaze: 0-->Normal  3. Visual: 0-->No visual loss  4. Facial Palsy: 0-->Normal symmetrical movements  5a. Motor Arm, Left: 0-->No drift, limb holds 90 (or 45) degrees for full 10 secs  5b. Motor Arm, Right: 0-->No drift, limb holds 90 (or 45) degrees for full 10 secs  6a. Motor Leg, Left:  0-->No drift, leg holds 30 degree position for full 5 secs  6b. Motor Leg, Right: 0-->No drift, leg holds 30 degree position for full 5 secs  7. Limb Ataxia: 0-->Absent  8. Sensory: 0-->Normal, no sensory loss  9. Best Language: 0-->No aphasia, normal  10. Dysarthria: 1-->Mild-to-moderate dysarthria, patient slurs at least some words and, at worst, can be understood with some difficulty  11. Extinction and Inattention (formerly Neglect): 0-->No abnormality  Total (NIH Stroke Scale): 1        Modified Calcasieu Score: 2  Kitzmiller Coma Scale:    ABCD2 Score:    [id number] Score:   HAS -BLED Score:   ICH Score:   Hunt & Sarabia Classification:       Assessment/Plan:     Diagnostic Results:      Brain Imaging      CT Head 8/5/19  No evidence of acute intracranial pathology.  Remote left basal ganglia infarct.     07/27/19 MRI Brain w/ w/o contrast:    1. Intermediate restricted diffusion signal in the left basal ganglia with faint enhancement and low associated ADC signal is most consistent with a subacute infarct     07/28/19 MRI C, T spine demyelinating w/ w/o contrast:  1.  There is a nonspecific, nonenhancing faint high STIR signal focus in the left paramedian ventral aspect of the medulla. This could reflect a demyelinating plaque or an acute lacunar infarction.  2. No cord signal abnormality in the thoracic spine.  3. There is a focal left subarticular disc protrusion causing mild narrowing of the left lateral recess of the canal.     07/31/19 MRI Brain w/ w/o contrast:    1. Likely old left posterior putaminal infarct with encephalomalacia.  Subacute infarct of the caudate head and anterior and extreme posterior left putamen.  2. Non-specific slight increased tonsillar ectopia with mild crowding at the foramen magnum compared to study from 07/27/2019 preserved rounding of the inferior tonsillar tips.  3. Stable subtle nonspecific sulcal FLAIR signal abnormality in the occipital lobes bilaterally.        Vessel Imaging       IR Cerebral Angiogram 8/2/19  Abnormal left M1 segment which tapers to high-grade stenosis.  There are hypertrophied lenticulostriate moyamoya type collaterals from the left M1 segment.  Both anterior cerebral arteries fill from the right, and there is leptomeningeal flow from left PENELOPE to left MCA territory.     MRA Brain WO Contrast 7/31/19  Small left MCA with moyamoya physiology on the left.     07/27/19 CTA head and neck:  1.  There is marked narrowing of the distal portion of the M1 segment of the left middle cerebral artery (see for example sequence 13, image 46) and no visible perfusion of the M2 and M3 branches of the left middle cerebral artery.  2. The left anterior cerebral artery is perfused by the right internal carotid artery via the anterior communicating artery.  This is likely a normal variant.  3. There is no other cervical carotid or intracranial vascular abnormality.        Cardiac Imaging      07/29/19 TTE with CFD, Shunt Study:  · Normal left ventricular systolic function. The estimated ejection fraction is 55%  · Normal LV diastolic function.  · Normal right ventricular systolic function.  · The estimated PA systolic pressure is 17 mm Hg  · Study is negative for shunt.      Interventions: Diagnostic Angiography    Complications: None    Disposition: Home or Self Care    Final Active Diagnoses:    Diagnosis Date Noted POA    PRINCIPAL PROBLEM:  Thrombotic stroke involving left middle cerebral artery [I63.312] 07/27/2019 Yes    Nausea [R11.0] 08/04/2019 No    Headache [R51] 07/28/2019 Yes    Reactive depression [F32.9] 08/03/2019 No    Unsteady gait [R26.81]  Yes    Cytotoxic cerebral edema [G93.6] 08/03/2019 Yes    Spasticity [R25.2] 07/27/2019 Yes    Dysarthria [R47.1]  Yes    Stroke [I63.9] 08/01/2019 Yes      Problems Resolved During this Admission:    Diagnosis Date Noted Date Resolved POA    Right sided numbness [R20.0] 07/27/2019 08/03/2019 Yes     * Thrombotic stroke  involving left middle cerebral artery  40 yo previously healthy F with recent subacute L basal ganglia stroke leading to dysarthria and R-sided weakness as well as several weeks of intermittent symptoms of confusion, LLE spasticity, and emotional lability following initial stroke. Work-up notable for vessel imaging initially concerning for vasculitis, later with radiology noting Moyamoya pathology. Pt also noted to have concern for demyelinating plaque vs acute infarct on MRI Cervical spine from outside facility. Pt transferred to [facility] for IR angiogram to evaluate.     Discussed with pt and sister at bedside; angiography with focal tapering of distal M1, consistent with early stages of Bergman bergman disease. Pt denies any hx of prior significant illness though sister reports there was a period right after high school where the patient became very functionally debilitated, requiring assistance to feed and dress self with multiple associated hospitalizations. This spontaneously resolved after ~2 months and the only conclusion they had been given from work-up was that the pt had low potassium for which a daily K supplement was started. (K level remained WNL throughout admission without use of supplementation.)   Cause for focal stenosis or Bergman bergman syndrome remains unclear - accelerated atherosclerosis possible (though pt reports no significant family history) vs underlying autoimmune disease (work-up previously performed at OSH appears negative thus far) vs underlying hematologic disorder with abnormal labs from OSH (some will likely need repeating once pt is outside the acute phase.) Feel that CNS vasculitis is unlikely (focal stenosis, negative LP.) HIV test negative, peripheral smear showed CBC WNL.     Pt was stable for discharge home following resolution of AM HA. She will follow up with Dr. Mercado in clinic in 6-8 weeks.      Antithrombotics for secondary stroke prevention:  Start ASA 325mg Daily, Clopidogrel 75mg  "Daily  Statins for secondary stroke prevention/HLD: Atorvastatin 80 mg qd  Aggressive risk factor modification: HLD, Diet, Exercise  Rehab efforts: Outpatient therapy  BP parameters: Infarct: SBP goal 140-160, encouraged pt toward aggressive hydration for adequate cerebral perfusion    Nausea  Pt nausea 2/2 "nerves" as well as reported sensitivity to strong scents.  +nausea but no vomiting  Continue PRN Zofran at d/c    Headache  HA had previously resolved during but pt c/o returned HA on 8/5  Ordered NS bolus, IV Reglan x 1, and repeat CT Head to further assess.   Repeat CT stable. Pt later reporting resolution of HA.  - prn acetaminophen  - Had been taking Fioricet outpatient, instructed pt to limit to < 3 days per week to avoid rebound headache.  Stable for d/c    Reactive depression  Pt becomes very emotional on multiple occasions during interviews  Sister states pt is overwhelmed by the struggles of her illness over recent months and that she is ready to be out of the hospital/go home.  Pt amenable to SSRI; started Prozac 20mg Daily. Later switched to qHS timing as pt states it makes her feel "hazy".     Unsteady gait  -S/p L basal ganglia infarct  PT/OT to evaluate and treat  Pt has been ambulating in halls with sister. PT/OT re-evaluated pt and now feel that she is appropriate for outpatient therapy rather than inpatient rehab.     Cytotoxic cerebral edema  Upon review of brain imaging, small area of cytotoxic cerebral edema identified in the territory of the Left middle cerebral artery. There is not associated mass effect.   The patients clinical exam remained without any worsening of symptoms which may indicate expansion of the stroke or the area of edema resulting in such clinical change.   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    Dysarthria  -S/p L basal ganglia infarct  SLP to evaluate and treat  Dispo outpatient therapy    Spasticity  - Continue Baclofen 5 mg bid prn " muscle spasms, started at OSH. Titrate up as needed.  Pt denying further complaints in recent days.  Per PCP        Recommendations:     Post-discharge complication risks: Falls    Stroke Education given to: patient and family    Follow-up in Stroke Clinic in 6-8 weeks.     Discharge Plan:  Antithrombotics: Aspirin 325mg, Clopidogrel 75mg  Statin: Atorvastatin 80mg  Aggresive risk factor modification:  High Cholesterol  Diet  Exercise    Follow Up:  Follow-up Information     Mariana Mendoza NP In 1 week.    Specialty:  Family Medicine  Why:  MD office was closed on Monday. Call your PCP to set up hospital follow-up appt within 1 week.  Contact information:  [address] [facility] [address]  [phone]             Jose De Jesus Mercado MD In 6 weeks.    Specialty:  Neurology  Why:  Someone from our office will call you to arrange Vascular Neurology follow-up in 6-8 weeks. If nobody calls within 1 week, call number above to schedule an appt.  Contact information:  [address]  [facility] [address]  [phone]                   Patient Instructions:   No discharge procedures on file.    Medications:  Reconciled Home Medications:      Medication List      START taking these medications    FLUoxetine 20 MG capsule  Take 1 capsule (20 mg total) by mouth every evening.     ondansetron 4 MG tablet  Commonly known as:  ZOFRAN  Take 1 tablet (4 mg total) by mouth every 8 (eight) hours as needed.        CHANGE how you take these medications    atorvastatin 80 MG tablet  Commonly known as:  LIPITOR  Take 1 tablet (80 mg total) by mouth once daily.  What changed:    · medication strength  · how much to take  · when to take this     baclofen 5 mg Tab tablet  Commonly known as:  LIORESAL  Take 1 tablet (5 mg total) by mouth 2 (two) times daily as needed.  What changed:    · medication strength  · when to take this  · reasons to take this        CONTINUE taking these medications    aspirin 325  MG EC tablet  Commonly known as:  ECOTRIN  Take 1 tablet (325 mg total) by mouth once daily.     butalbital-acetaminophen-caffeine -40 mg -40 mg per tablet  Commonly known as:  FIORICET, ESGIC  Take 1 tablet by mouth every 4 (four) hours as needed for Pain.     clopidogrel 75 mg tablet  Commonly known as:  PLAVIX  Take 75 mg by mouth once daily.     montelukast 10 mg tablet  Commonly known as:  SINGULAIR  Take 10 mg by mouth every evening.     VITAMIN B-12 50 mcg tablet  Generic drug:  cyanocobalamin (vitamin B-12)  Take 50 mcg by mouth once daily.        STOP taking these medications    potassium gluconate 595 mg (99 mg) Tab            Charmaine You PA-C  Comprehensive Stroke Center  Department of Vascular Neurology   [facility]

## 2019-08-14 ENCOUNTER — TELEPHONE (OUTPATIENT)
Dept: NEUROLOGY | Facility: CLINIC | Age: 39
End: 2019-08-14

## 2019-08-14 NOTE — TELEPHONE ENCOUNTER
----- Message from Jesus Bangura sent at 8/14/2019 11:57 AM CDT -----  Contact: Jeny (Sister) [phone]  Needs Advice    Reason for call: Jeny called to speak to someone regarding scheduling the patient's hospital follow up        Communication Preference:PHONE    Additional Information:

## 2019-08-27 ENCOUNTER — TELEPHONE (OUTPATIENT)
Dept: NEUROLOGY | Facility: CLINIC | Age: 39
End: 2019-08-27

## 2019-08-27 NOTE — TELEPHONE ENCOUNTER
----- Message from Dianna Shukla RN sent at 8/5/2019  4:09 PM CDT -----  Please schedule a neuro followup appt with Dr Mercado in 4-6 weeks. Patient was inpatient and seen by Dr Benjamin, but patient is requesting to followup with Dr Mercado. Please contact patient with date and time of appt.

## 2019-08-28 ENCOUNTER — TELEPHONE (OUTPATIENT)
Dept: NEUROLOGY | Facility: CLINIC | Age: 39
End: 2019-08-28

## 2019-09-03 ENCOUNTER — OFFICE VISIT (OUTPATIENT)
Dept: NEUROLOGY | Facility: CLINIC | Age: 39
End: 2019-09-03
Payer: MEDICAID

## 2019-09-03 VITALS
BODY MASS INDEX: 25.27 KG/M2 | HEIGHT: 64 IN | DIASTOLIC BLOOD PRESSURE: 79 MMHG | WEIGHT: 148 LBS | HEART RATE: 76 BPM | SYSTOLIC BLOOD PRESSURE: 127 MMHG

## 2019-09-03 DIAGNOSIS — R47.1 DYSARTHRIA: ICD-10-CM

## 2019-09-03 DIAGNOSIS — I69.351 HEMIPARESIS OF RIGHT DOMINANT SIDE AS LATE EFFECT OF CEREBRAL INFARCTION: ICD-10-CM

## 2019-09-03 DIAGNOSIS — I67.5 MOYA MOYA DISEASE: Primary | ICD-10-CM

## 2019-09-03 DIAGNOSIS — Z86.73 HISTORY OF ISCHEMIC LEFT MCA STROKE: ICD-10-CM

## 2019-09-03 DIAGNOSIS — R26.81 UNSTEADY GAIT: ICD-10-CM

## 2019-09-03 DIAGNOSIS — G44.89 OTHER HEADACHE SYNDROME: ICD-10-CM

## 2019-09-03 PROCEDURE — 99214 OFFICE O/P EST MOD 30 MIN: CPT | Mod: PBBFAC | Performed by: PSYCHIATRY & NEUROLOGY

## 2019-09-03 PROCEDURE — 99999 PR PBB SHADOW E&M-EST. PATIENT-LVL IV: ICD-10-PCS | Mod: PBBFAC,,, | Performed by: PSYCHIATRY & NEUROLOGY

## 2019-09-03 PROCEDURE — 99215 OFFICE O/P EST HI 40 MIN: CPT | Mod: S$PBB,,, | Performed by: PSYCHIATRY & NEUROLOGY

## 2019-09-03 PROCEDURE — 99215 PR OFFICE/OUTPT VISIT, EST, LEVL V, 40-54 MIN: ICD-10-PCS | Mod: S$PBB,,, | Performed by: PSYCHIATRY & NEUROLOGY

## 2019-09-03 PROCEDURE — 99999 PR PBB SHADOW E&M-EST. PATIENT-LVL IV: CPT | Mod: PBBFAC,,, | Performed by: PSYCHIATRY & NEUROLOGY

## 2019-09-03 NOTE — PROGRESS NOTES
Vascular Neurology  Clinic Note    Reason For Visit (Chief Complaint): L-MCA stroke / Moyamoya    HPI: Ms. Ponce is a 39 y.o. woman, previously healthy, who developed R hemiparesis and speech difficulty on 6/21/19, found to have acute L-BG stroke.  She was transferred from [place] to [facility] [place] early August for headaches and worsening R sided symptoms, and concern for vasculitis.      Briefly, Ms. Ponce represented to [place] in late July for persistent headaches and worsening of R sided weakness. MRI brain was negative for new strokes, but did show stenotic L-MCA that the OSH interpreted as vasculitic changes.  She was started on steroids and underwent LP which was bland (2->0 WBC, P31, Glu70).  She was transferred to [facility] [place]io, which showed tapering to near occlusion of LM1, with numerous lenticulostriate collateral vessels, in a MoyaMoya pattern.  No other vascular abnormalities to suggest vasculitis were seen.   DAPT was resumed and she was treated for nausea and headaches.  She was discharged home with outpatient therapy.    No re-admissions since she was discharged.  Still tolerating medication regimen and staying well hydrated.  Patient and family have noticed some regression over the last week - more off balance, increased headaches, several falls.  She feels nauseous first thing in the morning and takes daily Zofran.   Taking ibuprofen for headaches daily.  Receiving PT/OT outpatient.  Currently having her period, but not bleeding heavily.    Brain Imaging:  MRI Brain 7/27/19:  1. Intermediate restricted diffusion signal in the left basal ganglia with faint enhancement and low associated ADC signal is most consistent with a subacute infarct    DSA 8/2/19:  Abnormal left M1 segment which tapers to high-grade stenosis.  There are hypertrophied lenticulostriate moyamoya type collaterals from the left M1 segment. Both anterior cerebral arteries fill from the right, and there is leptomeningeal flow from left  PENELOPE to left MCA territory.    MRI C-spine 7/28/19:  There is a nonspecific, nonenhancing faint high STIR signal focus in the left paramedian ventral aspect of the medulla.  This could reflect a demyelinating plaque or an acute lacunar infarction.    Cardiac Evaluation:  TTE 7/29/19:  LVEF 55%  NL LV diastolic dysfunction  Neg bubble    Relevant Labwork:  Recent Labs   Lab 07/27/19  1226 07/28/19  0555   Hemoglobin A1C 5.5  --    LDL Cholesterol  --  74.6   HDL  --  45   Triglycerides  --  77   Cholesterol  --  135     RPR neg  HIV neg    Results for JAILENE MARTINEZ (MRN [id number]) as of 9/4/2019 15:22   Ref. Range 8/1/2019 09:59   HSV 1 and/or 2 IgG, CSF Latest Ref Range: <=0.89 IV 1.12 (H)   HSV 1 and/or 2 IgM, CSF Latest Ref Range: <=0.89 IV 0.12   HSV PCR Result Unknown Not Detected   HSV PCR Source Unknown CSF   Varicella Zoster by PCR Result Unknown Not Detected   Varicella Zoster PCR Source Unknown CSF       Results for JAILENE MARTINEZ (MRN [id number]) as of 9/4/2019 15:22   Ref. Range 8/1/2019 05:19   Olig Bands Interpretation, CSF Unknown See Note   IgG Index, CSF Latest Ref Range: 0.28 - 0.66 ratio 0.59   IgG, CSF Latest Ref Range: 0.0 - 6.0 mg/dL 1.7   Albumin, CSF Latest Ref Range: 0 - 35 mg/dL 12   IGG/ALBUMIN RATIO, CSF Latest Ref Range: 0.09 - 0.25 ratio 0.14   IgG Synthetic Rate Latest Ref Range: <=8.0 mg/d <0.0   Albumin, Serum Latest Ref Range: 3500 - 5200 mg/dL 4180   Oligoclonal Bands Number, CSF Latest Ref Range: 0 - 1 Bands 0     CSF studies   Ref. Range 8/1/2019 09:55 8/1/2019 10:02   COLOR CSF Latest Ref Range: Colorless  Colorless Colorless   Heme Aliquot Latest Units: mL 3.0 7.0   Appearance, CSF Latest Ref Range: Clear  Clear Clear   WBC, CSF Latest Ref Range: 0 - 5 /cu mm 2 0   RBC, CSF Latest Ref Range: 0 /cu mm 150 (A) 292 (A)   Glucose, CSF Latest Ref Range: 40 - 70 mg/dL 70    Protein, CSF Ref Range: 12 - 60 mg/dL 31      Rheum Labs   Ref. Range 7/27/2019 19:08   ds DNA Ab Latest Ref Range:  None Detected  None Detected   Anti-Histone Antibody Latest Ref Range: 0.0 - 0.9 Units 0.2   SSA 60 (Ro) JORJE Antibody Latest Ref Range: 0 - 40 AU/mL 0   Seo (JORJE) Ab, IgG Latest Ref Range: 0 - 40 AU/mL 0   Myeloperoxidase Ab 0 - 19 AU/mL 0   ANCA Proteinase 3 0 - 19 AU/mL 0   AL Screen Latest Ref Range: None Detected  None Detected   SSA Antibody Latest Ref Range: 0 - 40 AU/mL 0   SSB Antibody Latest Ref Range: 0 - 40 AU/mL 2   Complement (C-3) Latest Ref Range: 50 - 180 mg/dL 121   Complement (C-4) Latest Ref Range: 11 - 44 mg/dL 40   Compl, Total (CH50) Latest Ref Range: 60 - 144  Units 122   IgE Latest Ref Range: 0 - 100 IU/mL <35   Rheumatoid Factor Latest Ref Range: 0.0 - 15.0 IU/mL <8.6   Ribosomal P Ab Latest Ref Range: 0 - 40 AU/mL 0   RNP Antibody Latest Ref Range: 0 - 40 AU/mL 2         I independently viewed the above imaging studies and  I reviewed the reports of the above imaging.  I reviewed the above labwork.    Review of Systems  Msk: negative for muscle pain  Skin: negative for pruritis  Neuro: + HA, nausea  All others negative    Past Medical History  Past Medical History:   Diagnosis Date    High cholesterol      Family History  No family history stroke or neurological disease.    Social History  Completed high school.  Was working mowing lawns prior to stroke.  Non-smoker.    Medication List with Changes/Refills   Current Medications    ASPIRIN (ECOTRIN) 325 MG EC TABLET    Take 1 tablet (325 mg total) by mouth once daily.    ATORVASTATIN (LIPITOR) 80 MG TABLET    Take 1 tablet (80 mg total) by mouth once daily.    BACLOFEN (LIORESAL) 5 MG TAB TABLET    Take 1 tablet (5 mg total) by mouth 2 (two) times daily as needed.    CLOPIDOGREL (PLAVIX) 75 MG TABLET    Take 75 mg by mouth once daily.    CYANOCOBALAMIN, VITAMIN B-12, (VITAMIN B-12) 50 MCG TABLET    Take 50 mcg by mouth once daily.    FLUOXETINE 20 MG CAPSULE    Take 1 capsule (20 mg total) by mouth every evening.    MONTELUKAST  "(SINGULAIR) 10 MG TABLET    Take 10 mg by mouth every evening.    ONDANSETRON (ZOFRAN) 4 MG TABLET    Take 1 tablet (4 mg total) by mouth every 8 (eight) hours as needed.   Discontinued Medications    BUTALBITAL-ACETAMINOPHEN-CAFFEINE -40 MG (FIORICET, ESGIC) -40 MG PER TABLET    Take 1 tablet by mouth every 4 (four) hours as needed for Pain.       EXAM  Vital Signs:Blood pressure 127/79, pulse 76, height 5' 4" (1.626 m), weight 67.1 kg (148 lb).  General: well appearing without discomfort   Neck: no carotid bruits  CV: RRR, nL S1&S2  Resp: breathing comfortably, no wheezing  Ext: wwp, no pedal edema    Mental Status: Alert and oriented, normal attention, speech fluent and prosodic, naming and repetition intact, follows multistep embedded commands, no e/o neglect or extinction  Cranial Nerves: L pupil slightly larger, both reactive, EOMI, VFF, sensation intact, L facial?, mildly disarthric, SCM/trap 5/5  Motor: Orbits R arm, finger taps slower on R  R Shoulder abductor 5/5, 4+/5 elbow flex/ex, 5/5   R hip flexor 4+/5, knee flex/ex 5/5  Sensory: intact light touch bilaterally  Coordination: no ataxia on finger-to-nose  Gait & Stance: hemiparetic  DTR: brisk on R    NIHSS - 2    ___________________  ASSESSMENT & PLAN  Ms. Ponce is a 39 y.o. woman, previously healthy, who developed R hemiparesis and speech difficulty on 6/21/19, found to have acute L-BG stroke.  Cerebral angiogram with narrowing of L proximal MCA and robust lenticulostriate collateral network, conerncing for Moyamoya disease.  No other rheumatological or hematological process to explain focal stenosis.  Unclear significance of small medullary lesion (seen on CT spine).  Will continue DAPT, statin, and encourage good PO hydration.  Given slight regression this week (though stable exam on my assessment), will repeat MRI dickson to ensure no new infarct.    - MRI brain.  - Continue PT/OT.  - Continue aspirin and plavix for secondary stroke " prevention.  - Continue atorvastatin 80 mg for secondary stroke prevention.  - Magnesium/Riboflvain for headaches.  - Katina for nausea.  - Referral to Neurosurgery for possible bypass or EDAS.  - RTC 3-4 mos.    Problem List Items Addressed This Visit        Unprioritized    Dysarthria    Unsteady gait    Headache    History of ischemic left MCA stroke    Hemiparesis of right dominant side as late effect of cerebral infarction    Bergman bergman disease - Primary    Relevant Orders    MRI Brain Without Contrast    Ambulatory consult to Physical Therapy    Ambulatory consult to Occupational Therapy    Ambulatory consult to Speech Therapy    Ambulatory Referral to Neurosurgery          Caridad Benjamin MD  Vascular Neurology

## 2019-09-03 NOTE — PATIENT INSTRUCTIONS
- Call [facility]: [phone].  [address] [facility]  [place] [address]  - Continue aspirin and plavix for stroke prevention.  - Continue atorvastatin 80 mg daily for stroke prevention.  - Have MRI brain done.  - Switch to tylenol as needed for headache.  - Try taking daily magnesium oxide 400 mg daily and riboflavin (vitamin B2) 400 mg daily for headache.  - Try ginger tea or ginger chews for nausea.  - Return to clinic in 3 mos.

## 2019-09-04 PROBLEM — I69.351 HEMIPARESIS OF RIGHT DOMINANT SIDE AS LATE EFFECT OF CEREBRAL INFARCTION: Status: ACTIVE | Noted: 2019-09-04

## 2019-09-04 PROBLEM — I63.9 STROKE: Status: RESOLVED | Noted: 2019-08-01 | Resolved: 2019-09-04

## 2019-09-04 PROBLEM — Z86.73 HISTORY OF ISCHEMIC LEFT MCA STROKE: Status: ACTIVE | Noted: 2019-09-04

## 2019-09-04 PROBLEM — I63.312 THROMBOTIC STROKE INVOLVING LEFT MIDDLE CEREBRAL ARTERY: Status: RESOLVED | Noted: 2019-07-27 | Resolved: 2019-09-04

## 2019-09-04 PROBLEM — G93.6 CYTOTOXIC CEREBRAL EDEMA: Status: RESOLVED | Noted: 2019-08-03 | Resolved: 2019-09-04

## 2019-09-04 PROBLEM — I67.5 MOYA MOYA DISEASE: Status: ACTIVE | Noted: 2019-09-04

## 2019-09-09 ENCOUNTER — HOSPITAL ENCOUNTER (OUTPATIENT)
Dept: RADIOLOGY | Facility: HOSPITAL | Age: 39
Discharge: HOME | End: 2019-09-09
Attending: PSYCHIATRY & NEUROLOGY
Payer: MEDICAID

## 2019-09-09 DIAGNOSIS — I67.5 MOYA MOYA DISEASE: ICD-10-CM

## 2019-09-09 PROCEDURE — 70551 MRI BRAIN STEM W/O DYE: CPT | Mod: 26,,, | Performed by: RADIOLOGY

## 2019-09-09 PROCEDURE — 70551 MRI BRAIN STEM W/O DYE: CPT | Mod: TC

## 2019-09-09 PROCEDURE — 70551 MRI BRAIN WITHOUT CONTRAST: ICD-10-PCS | Mod: 26,,, | Performed by: RADIOLOGY

## 2019-09-13 ENCOUNTER — TELEPHONE (OUTPATIENT)
Dept: NEUROLOGY | Facility: HOSPITAL | Age: 39
End: 2019-09-13

## 2019-09-23 ENCOUNTER — OFFICE VISIT (OUTPATIENT)
Dept: NEUROSURGERY | Facility: CLINIC | Age: 39
End: 2019-09-23
Payer: MEDICAID

## 2019-09-23 VITALS
SYSTOLIC BLOOD PRESSURE: 114 MMHG | TEMPERATURE: 98 F | HEART RATE: 66 BPM | HEIGHT: 64 IN | WEIGHT: 148 LBS | DIASTOLIC BLOOD PRESSURE: 75 MMHG | BODY MASS INDEX: 25.27 KG/M2

## 2019-09-23 DIAGNOSIS — I63.512 CEREBRAL INFARCTION DUE TO OCCLUSION OF LEFT MIDDLE CEREBRAL ARTERY: Primary | ICD-10-CM

## 2019-09-23 PROCEDURE — 99204 PR OFFICE/OUTPT VISIT, NEW, LEVL IV, 45-59 MIN: ICD-10-PCS | Mod: S$PBB,,, | Performed by: NEUROLOGICAL SURGERY

## 2019-09-23 PROCEDURE — 99204 OFFICE O/P NEW MOD 45 MIN: CPT | Mod: S$PBB,,, | Performed by: NEUROLOGICAL SURGERY

## 2019-09-23 PROCEDURE — 99213 OFFICE O/P EST LOW 20 MIN: CPT | Mod: PBBFAC | Performed by: NEUROLOGICAL SURGERY

## 2019-09-23 PROCEDURE — 99999 PR PBB SHADOW E&M-EST. PATIENT-LVL III: ICD-10-PCS | Mod: PBBFAC,,, | Performed by: NEUROLOGICAL SURGERY

## 2019-09-23 PROCEDURE — 99999 PR PBB SHADOW E&M-EST. PATIENT-LVL III: CPT | Mod: PBBFAC,,, | Performed by: NEUROLOGICAL SURGERY

## 2019-09-23 RX ORDER — PANTOPRAZOLE SODIUM 40 MG/1
40 TABLET, DELAYED RELEASE ORAL DAILY PRN
Refills: 2 | COMMUNITY
Start: 2019-09-10

## 2019-09-23 RX ORDER — BACLOFEN 10 MG/1
5 TABLET ORAL 2 TIMES DAILY PRN
Refills: 1 | Status: ON HOLD | COMMUNITY
Start: 2019-09-10 | End: 2023-07-07 | Stop reason: HOSPADM

## 2019-09-23 NOTE — LETTER
September 23, 2019      Caridad Benjamin MD  [address]  [facility] [address]           [facility] 7th Fl  [address]  [facility] [address]  Phone: [phone]          Patient: Ally Ponce   MR Number: [id number]   YOB: 1980   Date of Visit: 9/23/2019       Dear Dr. Caridad Benjamin:    Thank you for referring Ally Ponce to me for evaluation. Attached you will find relevant portions of my assessment and plan of care.    If you have questions, please do not hesitate to call me. I look forward to following Ally Ponce along with you.    Sincerely,    Ortega Seo MD    Enclosure  CC:  No Recipients    If you would like to receive this communication electronically, please contact [email or web address] or [phone] to request more information on [employer] access.    For providers and/or their staff who would like to refer a patient to [facility], please contact us through our one-stop-shop provider referral line, [facility], at [phone].    If you feel you have received this communication in error or would no longer like to receive these types of communications, please e-mail [email or web address]

## 2019-09-23 NOTE — PROGRESS NOTES
This office note has been dictated.    Ally Ponce is seen in neurosurgical consultation at the office this morning.    She is a 39-year-old lady in apparently previous good health who on 06/21/19,   awoke from sleep with speech difficulty and right-sided weakness.  She was taken   to [facility] in [place] and diagnosed with a left hemisphere stroke.    She stabilized and could be discharged to home, but then returned a week or so   later with shaking and tremors of the left extremities and difficulty with   balance.  A followup MRI showed the previous infarct, but no specific new   findings.  She has persisted with symptoms, perhaps gradually improving   right-sided weakness, but has gait imbalance, difficulty speaking and tendency   to fall.  MRI of the entire neuraxis was subsequently done and showed the   previous known left basal ganglia infarction and question of a small area of   demyelinization in the medulla.  Angiography was then done here showing a normal   right internal carotid circulation, but occlusion of the superior M2 segment   with lenticulostriate collateralization.  The patient's symptoms have continued   and she was referred to Neurosurgery for consideration of revascularization.    Past medical history is otherwise negative.  She has been treated with aspirin   and Plavix for stroke prevention and baclofen for spasticity.  She is not   working now.  She was doing gardening work, mowing lawns.    On physical examination she is a well-developed, well-nourished white lady who   is awake and cooperative.  Family member provided much of the history.    Neurologic examination of the head is unremarkable.  The superficial temporal   artery pulses are full.  Eyes show full extraocular movements.  Pupils are equal   and reactive to light.  Fundi show clear disk margins.  She saw fingers moving   in both peripheral fields.  Hearing is generally intact to finger rubbing.  The   neck is supple.   On neurological examination, she is speaking reasonably well   generally in rather short sentences.  Memory is fair, but has apparently been a   symptom at home with memory difficulties.  Finger-to-nose is mildly abnormal   bilaterally with terminal tremor.  When she closed her eyes to rest, she fell   backwards on the examining table.  Gait is quite ataxic.  She did not actually   fall to the ground.  Cranial nerves are otherwise intact.  She has normal facial   sensation and movement.  The tongue protrudes in midline.  Strength is mildly   decreased in the right extremities compared to the left.  Sensation is grossly   preserved.  Deep tendon reflexes are generally hyperactive throughout.    The multiple above studies were reviewed.  The cerebral angiogram done here on   08/02/19, shows a normal right carotid injection with filling of all the   branches of the right middle cerebral artery and filling both anterior cerebral   arteries.  The left-sided injection shows a hypoplastic A1, but no clear   stenosis at the internal carotid artery bifurcation.  The proximal M1 fills out   normally then tapers and there appears to be complete occlusion of one M2   branch.  There is a prominent lenticulostriate branch.  The left posterior   communicating artery is dominant to the posterior cerebral artery.    IMPRESSION:  Left middle cerebral artery branch occlusion.    RECOMMENDATIONS:  It is unclear whether this represents true moyamoya disease.    The superficial temporal artery is seen briefly on the internal carotid   injection.  After discussion with Neuroradiology, we will have angiography   repeated including the external circulation and consider a recommendation for a   revascularization procedure.        STEPHANIE  dd: 09/23/2019 11:11:53 (CDT)  td: 09/24/2019 00:05:35 (CDT)  Doc ID   #[id number]  Job ID #[id number]    CC: Ally Ponce

## 2019-10-03 ENCOUNTER — TELEPHONE (OUTPATIENT)
Dept: NEUROSURGERY | Facility: CLINIC | Age: 39
End: 2019-10-03

## 2019-10-03 NOTE — TELEPHONE ENCOUNTER
----- Message from Arturo Ingram sent at 10/3/2019 10:40 AM CDT -----  Contact: Jeny (sister) @ [phone]  Jeny is calling to schedule angiogram

## 2019-10-03 NOTE — TELEPHONE ENCOUNTER
JEREMY Fermin, sent Lidia Jaimes in IR another message to sched. She will contact me if she has not heard from the  by mid next week.

## 2019-10-28 ENCOUNTER — TELEPHONE (OUTPATIENT)
Dept: INTERVENTIONAL RADIOLOGY/VASCULAR | Facility: HOSPITAL | Age: 39
End: 2019-10-28

## 2019-10-28 DIAGNOSIS — I63.512 CEREBRAL INFARCTION DUE TO OCCLUSION OF LEFT MIDDLE CEREBRAL ARTERY: Primary | ICD-10-CM

## 2019-10-28 RX ORDER — HEPARIN SODIUM 1000 [USP'U]/ML
3000 INJECTION, SOLUTION INTRAVENOUS; SUBCUTANEOUS ONCE
Status: CANCELLED | OUTPATIENT
Start: 2019-10-28 | End: 2019-10-28

## 2019-10-28 RX ORDER — FENTANYL CITRATE 50 UG/ML
50 INJECTION, SOLUTION INTRAMUSCULAR; INTRAVENOUS
Status: CANCELLED | OUTPATIENT
Start: 2019-10-28

## 2019-10-28 RX ORDER — MIDAZOLAM HYDROCHLORIDE 1 MG/ML
1 INJECTION INTRAMUSCULAR; INTRAVENOUS
Status: CANCELLED | OUTPATIENT
Start: 2019-10-28

## 2019-10-29 ENCOUNTER — HOSPITAL ENCOUNTER (OUTPATIENT)
Facility: HOSPITAL | Age: 39
Discharge: HOME | End: 2019-10-29
Attending: NEUROLOGICAL SURGERY | Admitting: NEUROLOGICAL SURGERY
Payer: MEDICAID

## 2019-10-29 VITALS
WEIGHT: 154 LBS | HEART RATE: 64 BPM | HEIGHT: 64 IN | TEMPERATURE: 98 F | OXYGEN SATURATION: 100 % | BODY MASS INDEX: 26.29 KG/M2 | SYSTOLIC BLOOD PRESSURE: 104 MMHG | DIASTOLIC BLOOD PRESSURE: 68 MMHG | RESPIRATION RATE: 18 BRPM

## 2019-10-29 DIAGNOSIS — I63.512 CEREBRAL INFARCTION DUE TO OCCLUSION OF LEFT MIDDLE CEREBRAL ARTERY: ICD-10-CM

## 2019-10-29 LAB
B-HCG UR QL: NEGATIVE
CTP QC/QA: YES

## 2019-10-29 PROCEDURE — 81025 URINE PREGNANCY TEST: CPT | Performed by: NEUROLOGICAL SURGERY

## 2019-10-29 PROCEDURE — 25000003 PHARM REV CODE 250: Performed by: STUDENT IN AN ORGANIZED HEALTH CARE EDUCATION/TRAINING PROGRAM

## 2019-10-29 PROCEDURE — 63600175 PHARM REV CODE 636 W HCPCS: Performed by: STUDENT IN AN ORGANIZED HEALTH CARE EDUCATION/TRAINING PROGRAM

## 2019-10-29 RX ORDER — FENTANYL CITRATE 50 UG/ML
INJECTION, SOLUTION INTRAMUSCULAR; INTRAVENOUS CODE/TRAUMA/SEDATION MEDICATION
Status: COMPLETED | OUTPATIENT
Start: 2019-10-29 | End: 2019-10-29

## 2019-10-29 RX ORDER — MIDAZOLAM HYDROCHLORIDE 1 MG/ML
INJECTION INTRAMUSCULAR; INTRAVENOUS CODE/TRAUMA/SEDATION MEDICATION
Status: COMPLETED | OUTPATIENT
Start: 2019-10-29 | End: 2019-10-29

## 2019-10-29 RX ORDER — ACETAMINOPHEN 325 MG/1
650 TABLET ORAL ONCE
Status: COMPLETED | OUTPATIENT
Start: 2019-10-29 | End: 2019-10-29

## 2019-10-29 RX ORDER — SODIUM CHLORIDE 9 MG/ML
INJECTION, SOLUTION INTRAVENOUS CONTINUOUS
Status: DISCONTINUED | OUTPATIENT
Start: 2019-10-29 | End: 2019-10-29 | Stop reason: HOSPADM

## 2019-10-29 RX ADMIN — MIDAZOLAM HYDROCHLORIDE 1 MG: 1 INJECTION, SOLUTION INTRAMUSCULAR; INTRAVENOUS at 11:10

## 2019-10-29 RX ADMIN — ACETAMINOPHEN 650 MG: 325 TABLET ORAL at 01:10

## 2019-10-29 RX ADMIN — FENTANYL CITRATE 50 MCG: 50 INJECTION, SOLUTION INTRAMUSCULAR; INTRAVENOUS at 11:10

## 2019-10-29 NOTE — H&P
Radiology History & Physical      SUBJECTIVE:     Chief Complaint: follow up cerebral angio     History of Present Illness:  Ally Ponce is a 39 y.o. female who presents for follow up cerebral angiogram in patient with fluctuating symptoms of right arm and leg weakness and aphasia. Today she does not have any weakness, numbness.      Past Medical History:   Diagnosis Date    High cholesterol     Moyamoya      Past Surgical History:   Procedure Laterality Date    TUBAL LIGATION  2005       Home Meds:   Prior to Admission medications    Medication Sig Start Date End Date Taking? Authorizing Provider   aspirin (ECOTRIN) 325 MG EC tablet Take 1 tablet (325 mg total) by mouth once daily. 8/5/19 8/4/20 Yes Charmaine You PA-C   atorvastatin (LIPITOR) 80 MG tablet Take 1 tablet (80 mg total) by mouth once daily. 8/5/19 8/4/20 Yes Charmaine You PA-C   baclofen (LIORESAL) 10 MG tablet Take 5 mg by mouth 2 (two) times daily as needed. 9/10/19  Yes Historical Provider, MD   clopidogrel (PLAVIX) 75 mg tablet Take 75 mg by mouth once daily.   Yes Historical Provider, MD   cyanocobalamin, vitamin B-12, (VITAMIN B-12) 50 mcg tablet Take 50 mcg by mouth once daily.   Yes Historical Provider, MD   FLUoxetine 20 MG capsule Take 1 capsule (20 mg total) by mouth every evening. 8/5/19 8/4/20 Yes Charmaine You PA-C   montelukast (SINGULAIR) 10 mg tablet Take 10 mg by mouth every evening.   Yes Historical Provider, MD   ondansetron (ZOFRAN) 4 MG tablet Take 1 tablet (4 mg total) by mouth every 8 (eight) hours as needed. 8/5/19  Yes Charmaine You PA-C   pantoprazole (PROTONIX) 40 MG tablet TAKE ONE TABLET BY MOUTH DAILY, Instr TRY 14 DAY TRIAL AT FIRST ONSET OF SYMPTONS 9/10/19  Yes Historical Provider, MD     Anticoagulants/Antiplatelets: no anticoagulation    Allergies: Review of patient's allergies indicates:  No Known Allergies  Sedation History:  no adverse reactions    Review of Systems:   Hematological: no known  coagulopathies  Respiratory: no shortness of breath  Cardiovascular: no chest pain  Gastrointestinal: no abdominal pain  Genito-Urinary: no dysuria  Musculoskeletal: negative  Neurological: no TIA or stroke symptoms         OBJECTIVE:     Vital Signs (Most Recent)  Temp: 98.4 °F (36.9 °C) (10/29/19 0933)  Pulse: (!) 58 (10/29/19 0933)  Resp: 16 (10/29/19 0933)  BP: 111/73 (10/29/19 0933)  SpO2: 100 % (10/29/19 0933)    Physical Exam:  ASA: 2  Mallampati: 2    General: no acute distress  Mental Status: alert and oriented to person, place and time  HEENT: normocephalic, atraumatic  Chest: unlabored breathing  Heart: regular heart rate  Abdomen: nondistended  Extremity: moves all extremities    Laboratory  Lab Results   Component Value Date    INR 1.0 10/29/2019       Lab Results   Component Value Date    WBC 7.10 08/03/2019    HGB 12.4 08/03/2019    HCT 38.7 08/03/2019    MCV 91 08/03/2019     08/03/2019      Lab Results   Component Value Date    GLU 93 10/29/2019     10/29/2019    K 4.0 10/29/2019     10/29/2019    CO2 28 10/29/2019    BUN 14 10/29/2019    CREATININE 0.8 10/29/2019    CALCIUM 9.0 10/29/2019    MG 2.0 08/02/2019    ALT 19 10/29/2019    AST 16 10/29/2019    ALBUMIN 3.7 10/29/2019    BILITOT 0.2 10/29/2019       ASSESSMENT/PLAN:     Sedation Plan: moderate sedation  Patient will undergo : follow up cerebral  angiogram.    Renny Stapleton MD  NeuroIR fellow.

## 2019-10-29 NOTE — PROGRESS NOTES
Pt tolerated cerebral angiogram well. VSS. Had small episode of vasovagaling when deploying angioseal.    Angioseal deployed at 1150. Pt can sit up and bend after 1350. Will recover in the ROCU. Report to ROCU RN.

## 2019-10-29 NOTE — DISCHARGE SUMMARY
Radiology Discharge Summary      Hospital Course: No complications    Admit Date: 10/29/2019  Discharge Date: 10/29/2019     Instructions Given to Patient: Yes  Diet: Resume prior diet  Activity: activity as tolerated    Description of Condition on Discharge: Stable  Vital Signs (Most Recent): Temp: 98.4 °F (36.9 °C) (10/29/19 0933)  Pulse: (!) 51 (10/29/19 1150)  Resp: 17 (10/29/19 1150)  BP: (!) 94/59 (10/29/19 1150)  SpO2: 100 % (10/29/19 1150)    Discharge Disposition: Home    Discharge Diagnosis: Left MCA stenosis distal M1 segment     Follow-up: with Dr. Ortega Stapleton MD  NeuroIR fellow.

## 2019-10-29 NOTE — PROGRESS NOTES
PIV removed, bleeding controlled, pressure dressing placed. Discharge instruction, and follow up care reviewed.  Patient verbalized understanding, and denies further questions.  Provided with ROCU and after hours number.  Patient awaiting transport via wheelchair.

## 2019-10-29 NOTE — PROCEDURES
Neurointerventional Radiology Post-Procedure Note    Pre Op Diagnosis: Left MCA stenosis     Post Op Diagnosis: Same as above     Procedure: Cerebral angiogram    Procedure performed by: Felicia GLYNN, Aashish Stapleton MD.    Written Informed Consent Obtained: Yes    Specimen Removed: NO    Estimated Blood Loss: less than 50     Procedure report:     A 5F sheath was placed into the right femoral artery and a 5F James catheter was advanced into the aortic arch.  The bilateral ECA and ICA were subselected and angiography of the brain was performed after injection into each of these vessels.    Preliminary interpretation: Left MCA distal M1 segment stenosis with puff of smoke appearance of the lenticulostriate collaterals.  Please see Imaging report for full details.    A right femoral artery angiogram was performed, the sheath removed and hemostasis achieved using Angioseal 6F.  No hematoma was present at the time of hemostasis.    The patient tolerated the procedure well.     Renny Stapleton MD  NeuroInterventional Radiology Fellow

## 2019-10-29 NOTE — PLAN OF CARE
Plan of care reviewed with patient, patient verbalizes understanding. Pt arrived to  for cerebral angiogram. Pt oriented to unit and staff. Comfort measures utilized. Pt safely transferred from stretcher to procedural table. Fall risk reviewed with patient, fall risk interventions maintained. Safety strap applied, positioner pillows utilized to minimize pressure points. Blankets applied. Pt prepped and draped utilizing standard sterile technique. Patient placed on continuous monitoring, as required by sedation policy. Timeouts completed utilizing standard universal time-out, per department and facility policy. RN to remain at bedside, continuous monitoring maintained. Pt resting comfortably. Denies pain/discomfort. Will continue to monitor. See flow sheets for monitoring, medication administration, and updates.

## 2019-10-29 NOTE — DISCHARGE INSTRUCTIONS
"**After hours and weekends: Call [phone] and ask for "Radiology Resident on call".    For immediate concerns that are not emergent, you may call our radiology clinic at: [phone]    "

## 2019-11-07 PROBLEM — R53.1 WEAKNESS: Status: ACTIVE | Noted: 2019-11-07

## 2019-11-11 ENCOUNTER — OFFICE VISIT (OUTPATIENT)
Dept: NEUROSURGERY | Facility: CLINIC | Age: 39
End: 2019-11-11
Payer: MEDICAID

## 2019-11-11 VITALS
HEART RATE: 69 BPM | BODY MASS INDEX: 26.95 KG/M2 | WEIGHT: 157 LBS | TEMPERATURE: 98 F | SYSTOLIC BLOOD PRESSURE: 118 MMHG | DIASTOLIC BLOOD PRESSURE: 70 MMHG

## 2019-11-11 DIAGNOSIS — I63.512 CEREBRAL INFARCTION DUE TO OCCLUSION OF LEFT MIDDLE CEREBRAL ARTERY: Primary | ICD-10-CM

## 2019-11-11 PROCEDURE — 99213 OFFICE O/P EST LOW 20 MIN: CPT | Mod: S$PBB,,, | Performed by: NEUROLOGICAL SURGERY

## 2019-11-11 PROCEDURE — 99213 PR OFFICE/OUTPT VISIT, EST, LEVL III, 20-29 MIN: ICD-10-PCS | Mod: S$PBB,,, | Performed by: NEUROLOGICAL SURGERY

## 2019-11-11 PROCEDURE — 99999 PR PBB SHADOW E&M-EST. PATIENT-LVL III: ICD-10-PCS | Mod: PBBFAC,,, | Performed by: NEUROLOGICAL SURGERY

## 2019-11-11 PROCEDURE — 99213 OFFICE O/P EST LOW 20 MIN: CPT | Mod: PBBFAC | Performed by: NEUROLOGICAL SURGERY

## 2019-11-11 PROCEDURE — 99999 PR PBB SHADOW E&M-EST. PATIENT-LVL III: CPT | Mod: PBBFAC,,, | Performed by: NEUROLOGICAL SURGERY

## 2019-11-11 NOTE — PROGRESS NOTES
This office note has been dictated.  Ally Ponce was seen in neurosurgical followup at the office today.  She has   continued with physical therapy, but complains of curling of the toes of her   right foot, which is painful for her and continued difficulty walking.  She also   continues to note some speech hesitancy and intermittent tremor in the right   arm.  She has had no new stroke events.  She remains on Plavix and aspirin.    On examination today, she is awake and cooperative.  Speech is generally good   today, somewhat rapid, but the words are well formed.  She has full extraocular   movements and equal pupils.  There is minimal pronator drift of the outstretched   right upper extremity and strength is good in the hand, however, she is still   quite weak in the right leg and had difficulty walking.  Part of this she   relates to the discomfort in her right foot.    Cerebral angiography was done at [facility] on 10/29/19.  The right internal   carotid injection shows normal filling of the middle cerebral artery and both   anterior cerebral arteries.  There is fairly advanced collateral circulation   from the left anterior cerebral artery into the middle cerebral artery   territory.  The left internal carotid injection continues to show an occlusion   of the larger parietal branch of the M2 on the left with diminished filling into   the left parietal area.  This is the area that is somewhat compensated by the   right internal carotid artery injection.  The posterior cerebral artery provides   some collateralization, but less than the right internal carotid.  The left   external carotid injection shows filling of the superficial temporal artery.    This is fairly small.    At this point, she has had no new ischemic events.  I am unsure how much new   blood supply would be increased attempting EC-IC bypass.  For now, I will plan   to have her return in three months with a followup MRI and have MRI perfusion    study done at that time also.        RDS/HN  dd: 11/11/2019 13:13:50 (CST)  td: 11/12/2019 02:58:20 (CST)  Doc ID   #[id number]  Job ID #[id number]    CC: Ally Ponce

## 2019-11-20 ENCOUNTER — TELEPHONE (OUTPATIENT)
Dept: NEUROLOGY | Facility: CLINIC | Age: 39
End: 2019-11-20

## 2019-11-20 NOTE — TELEPHONE ENCOUNTER
----- Message from Reyna Alvarez sent at 11/20/2019  1:47 PM CST -----  Contact: pt @ [phone]  Calling to schedule a follow up appt with Dr. Benjamin. Please call.

## 2019-12-17 PROBLEM — R26.89 BALANCE DISORDER: Status: ACTIVE | Noted: 2019-12-17

## 2019-12-17 PROBLEM — R29.898 DECREASED STRENGTH OF LOWER EXTREMITY: Status: ACTIVE | Noted: 2019-12-17

## 2020-01-10 ENCOUNTER — TELEPHONE (OUTPATIENT)
Dept: NEUROSURGERY | Facility: CLINIC | Age: 40
End: 2020-01-10

## 2020-01-10 DIAGNOSIS — I63.512 CEREBRAL INFARCTION DUE TO OCCLUSION OF LEFT MIDDLE CEREBRAL ARTERY: Primary | ICD-10-CM

## 2020-01-10 DIAGNOSIS — I63.9 CEREBROVASCULAR ACCIDENT (CVA), UNSPECIFIED MECHANISM: ICD-10-CM

## 2020-01-10 NOTE — TELEPHONE ENCOUNTER
----- Message from Perla Faith sent at 1/10/2020  8:44 AM CST -----  Contact: Jeny (sister)  Pt sister is calling to schedule pt f/u appt with Dr. Seo. Pt contact number is [phone].

## 2020-01-28 ENCOUNTER — OFFICE VISIT (OUTPATIENT)
Dept: NEUROLOGY | Facility: CLINIC | Age: 40
End: 2020-01-28
Payer: MEDICAID

## 2020-01-28 VITALS
HEIGHT: 64 IN | DIASTOLIC BLOOD PRESSURE: 69 MMHG | WEIGHT: 157 LBS | BODY MASS INDEX: 26.8 KG/M2 | SYSTOLIC BLOOD PRESSURE: 127 MMHG | HEART RATE: 59 BPM

## 2020-01-28 DIAGNOSIS — G44.89 OTHER HEADACHE SYNDROME: Primary | ICD-10-CM

## 2020-01-28 DIAGNOSIS — R26.81 UNSTEADY GAIT: ICD-10-CM

## 2020-01-28 DIAGNOSIS — I69.351 HEMIPARESIS OF RIGHT DOMINANT SIDE AS LATE EFFECT OF CEREBRAL INFARCTION: ICD-10-CM

## 2020-01-28 DIAGNOSIS — I67.5 MOYA MOYA DISEASE: ICD-10-CM

## 2020-01-28 PROCEDURE — 99999 PR PBB SHADOW E&M-EST. PATIENT-LVL IV: CPT | Mod: PBBFAC,,, | Performed by: PSYCHIATRY & NEUROLOGY

## 2020-01-28 PROCEDURE — 99999 PR PBB SHADOW E&M-EST. PATIENT-LVL IV: ICD-10-PCS | Mod: PBBFAC,,, | Performed by: PSYCHIATRY & NEUROLOGY

## 2020-01-28 PROCEDURE — 99214 OFFICE O/P EST MOD 30 MIN: CPT | Mod: S$PBB,,, | Performed by: PSYCHIATRY & NEUROLOGY

## 2020-01-28 PROCEDURE — 99214 OFFICE O/P EST MOD 30 MIN: CPT | Mod: PBBFAC | Performed by: PSYCHIATRY & NEUROLOGY

## 2020-01-28 PROCEDURE — 99214 PR OFFICE/OUTPT VISIT, EST, LEVL IV, 30-39 MIN: ICD-10-PCS | Mod: S$PBB,,, | Performed by: PSYCHIATRY & NEUROLOGY

## 2020-01-28 RX ORDER — GABAPENTIN 100 MG/1
100 CAPSULE ORAL 3 TIMES DAILY
Qty: 90 CAPSULE | Refills: 3 | Status: SHIPPED | OUTPATIENT
Start: 2020-01-28 | End: 2020-05-04

## 2020-01-28 NOTE — PROGRESS NOTES
Vascular Neurology  Clinic Note    Reason For Visit (Chief Complaint): L-MCA stroke / Moyamoya    HPI: Ms. Ponce is a 39 y.o. woman, previously healthy, who developed R hemiparesis and speech difficulty on 6/21/19, found to have acute L-BG stroke.  She was transferred from [place] to [facility] in early August for headaches and worsening R sided symptoms, and concern for vasculitis.      Briefly, Ms. Ponce represented to [place] in late July for persistent headaches and worsening of R sided weakness. MRI brain was negative for new strokes, but did show stenotic L-MCA that the OSH interpreted as vasculitic changes.  She was started on steroids and underwent LP which was bland (2->0 WBC, P31, Glu70).  She was transferred to [facility] for angio, which showed tapering to near occlusion of LM1, with numerous lenticulostriate collateral vessels, in a MoyaMoya pattern.  No other vascular abnormalities to suggest vasculitis were seen.   DAPT was resumed and she was treated for nausea and headaches.  She was discharged home with outpatient therapy.    Interval History:  Having tightness sensation on R-side of head.  Doesn't feel stable when she's sitting (feels like she's floating).  Also feels totally numb in R side.  Currently has a cold and on her period.  She generally feels worse when she is menstruating. Feels like she is not currently making progress anymore in recovery.  MRI brain was repeated in September - no changes.  She has followup and MR-perfusion scheduled with Dr. Seo next month.    Brain Imaging:  MRI Brain 7/27/19:  1. Intermediate restricted diffusion signal in the left basal ganglia with faint enhancement and low associated ADC signal is most consistent with a subacute infarct    DSA 8/2/19:  Abnormal left M1 segment which tapers to high-grade stenosis.  There are hypertrophied lenticulostriate moyamoya type collaterals from the left M1 segment. Both anterior cerebral arteries fill from the right, and there is  leptomeningeal flow from left PENELOPE to left MCA territory.      Cardiac Evaluation:  TTE 7/29/19:  LVEF 55%  NL LV diastolic dysfunction  Neg bubble    Relevant Labwork:  Recent Labs   Lab 07/27/19  1226  01/28/20  1118   Hemoglobin A1C 5.5  --   --    LDL Cholesterol  --    < > 45.0 L   HDL  --    < > 41   Triglycerides  --    < > 135   Cholesterol  --    < > 113 L    < > = values in this interval not displayed.     RPR neg  HIV neg    Results for JAILENE MARTINEZ (MRN [id number]) as of 9/4/2019 15:22   Ref. Range 8/1/2019 09:59   HSV 1 and/or 2 IgG, CSF Latest Ref Range: <=0.89 IV 1.12 (H)   HSV 1 and/or 2 IgM, CSF Latest Ref Range: <=0.89 IV 0.12   HSV PCR Result Unknown Not Detected   HSV PCR Source Unknown CSF   Varicella Zoster by PCR Result Unknown Not Detected   Varicella Zoster PCR Source Unknown CSF       Results for JAILENE MARTINEZ (MRN [id number]) as of 9/4/2019 15:22   Ref. Range 8/1/2019 05:19   Olig Bands Interpretation, CSF Unknown See Note   IgG Index, CSF Latest Ref Range: 0.28 - 0.66 ratio 0.59   IgG, CSF Latest Ref Range: 0.0 - 6.0 mg/dL 1.7   Albumin, CSF Latest Ref Range: 0 - 35 mg/dL 12   IGG/ALBUMIN RATIO, CSF Latest Ref Range: 0.09 - 0.25 ratio 0.14   IgG Synthetic Rate Latest Ref Range: <=8.0 mg/d <0.0   Albumin, Serum Latest Ref Range: 3500 - 5200 mg/dL 4180   Oligoclonal Bands Number, CSF Latest Ref Range: 0 - 1 Bands 0     CSF studies   Ref. Range 8/1/2019 09:55 8/1/2019 10:02   COLOR CSF Latest Ref Range: Colorless  Colorless Colorless   Heme Aliquot Latest Units: mL 3.0 7.0   Appearance, CSF Latest Ref Range: Clear  Clear Clear   WBC, CSF Latest Ref Range: 0 - 5 /cu mm 2 0   RBC, CSF Latest Ref Range: 0 /cu mm 150 (A) 292 (A)   Glucose, CSF Latest Ref Range: 40 - 70 mg/dL 70    Protein, CSF Ref Range: 12 - 60 mg/dL 31      Rheum Labs   Ref. Range 7/27/2019 19:08   ds DNA Ab Latest Ref Range: None Detected  None Detected   Anti-Histone Antibody Latest Ref Range: 0.0 - 0.9 Units 0.2   SSA 60 (Ro) JORJE  Antibody Latest Ref Range: 0 - 40 AU/mL 0   Seo (JORJE) Ab, IgG Latest Ref Range: 0 - 40 AU/mL 0   Myeloperoxidase Ab 0 - 19 AU/mL 0   ANCA Proteinase 3 0 - 19 AU/mL 0   AL Screen Latest Ref Range: None Detected  None Detected   SSA Antibody Latest Ref Range: 0 - 40 AU/mL 0   SSB Antibody Latest Ref Range: 0 - 40 AU/mL 2   Complement (C-3) Latest Ref Range: 50 - 180 mg/dL 121   Complement (C-4) Latest Ref Range: 11 - 44 mg/dL 40   Compl, Total (CH50) Latest Ref Range: 60 - 144  Units 122   IgE Latest Ref Range: 0 - 100 IU/mL <35   Rheumatoid Factor Latest Ref Range: 0.0 - 15.0 IU/mL <8.6   Ribosomal P Ab Latest Ref Range: 0 - 40 AU/mL 0   RNP Antibody Latest Ref Range: 0 - 40 AU/mL 2         I independently viewed the above imaging studies and  I reviewed the reports of the above imaging.  I reviewed the above labwork.    Review of Systems  Msk: negative for muscle pain  Skin: negative for pruritis  Neuro: + HA  All others negative    Past Medical History  Past Medical History:   Diagnosis Date    Dysarthria     Hemiparesis affecting right side as late effect of cerebrovascular accident (CVA)     High cholesterol     Hyperreflexia of lower extremity     Moyamoya     Stroke     x2     Family History  No family history stroke or neurological disease.    Social History  Completed high school.  Was working mowing lawns prior to stroke.  Non-smoker.    Medication List with Changes/Refills   New Medications    GABAPENTIN (NEURONTIN) 100 MG CAPSULE    Take 1 capsule (100 mg total) by mouth 3 (three) times daily.   Current Medications    ASPIRIN (ECOTRIN) 325 MG EC TABLET    Take 1 tablet (325 mg total) by mouth once daily.    ATORVASTATIN (LIPITOR) 80 MG TABLET    Take 1 tablet (80 mg total) by mouth once daily.    BACLOFEN (LIORESAL) 10 MG TABLET    Take 5 mg by mouth 2 (two) times daily as needed.    CLOPIDOGREL (PLAVIX) 75 MG TABLET    Take 75 mg by mouth once daily.    CYANOCOBALAMIN, VITAMIN B-12, (VITAMIN  "B-12) 50 MCG TABLET    Take 50 mcg by mouth once daily.    FLUOXETINE 20 MG CAPSULE    Take 1 capsule (20 mg total) by mouth every evening.    MONTELUKAST (SINGULAIR) 10 MG TABLET    Take 10 mg by mouth every evening.    ONDANSETRON (ZOFRAN) 4 MG TABLET    Take 1 tablet (4 mg total) by mouth every 8 (eight) hours as needed.    PANTOPRAZOLE (PROTONIX) 40 MG TABLET    TAKE ONE TABLET BY MOUTH DAILY, Instr TRY 14 DAY TRIAL AT FIRST ONSET OF SYMPTONS       EXAM  Vitals:    01/28/20 1023   BP: 127/69   Pulse: (!) 59   Weight: 71.2 kg (157 lb)   Height: 5' 4" (1.626 m)     General: well appearing without discomfort   Neck: no carotid bruits  CV: RRR, nL S1&S2  Resp: breathing comfortably, no wheezing  Ext: wwp, no pedal edema    Mental Status: Alert and oriented, normal attention, speech fluent and prosodic, naming and repetition intact, follows multistep embedded commands, no e/o neglect or extinction  Cranial Nerves: PERRL, EOMI, VFF, sensation intact, L facial?, mildly disarthric, SCM/trap 5/5  Motor: Orbits R arm, finger taps slower on R  R Shoulder abductor 5/5, 4+/5 elbow flex/ex, 5/5   R hip flexor 4+/5, knee flex/ex 4+/5  Sensory: reduced sensation R arm/leg  Coordination: no ataxia on finger-to-nose  Gait & Stance: hemiparetic, needs to hold onto objects for stability sometimes  DTR: brisk on R    NIHSS - 2    ___________________  ASSESSMENT & PLAN  Ms. Ponce is a 39 y.o. woman, previously healthy, who developed R hemiparesis and speech difficulty on 6/21/19, found to have acute L-BG stroke.  Cerebral angiogram with narrowing of L proximal MCA and robust lenticulostriate collateral network, conerncing for Moyamoya disease.  She remains on DAPT.  Had a modest return of motor function, but with unstable gait and frequent headaches.    - Start gabapentin 100 mg qhs for numbness/pain.  Increase as needed.  - Continue aspirin and plavix for secondary stroke prevention.  - Continue atorvastatin 80 mg for secondary " stroke prevention.  - Will recheck lipid panel today, possibly decrease statin.  - F/U with Neurosurgery.  - Consider copper IUD to alleviate symptoms that occur with periods.  - Stay well hydrated with water.  - RTC 6 mos.    Problem List Items Addressed This Visit        Unprioritized    Unsteady gait    Headache - Primary    Hemiparesis of right dominant side as late effect of cerebral infarction    Bergman bergman disease    Relevant Orders    CBC auto differential (Completed)    Lipid panel (Completed)    Ambulatory Referral to Neurology          Caridad Benjamin MD  Vascular Neurology

## 2020-01-28 NOTE — PATIENT INSTRUCTIONS
- Start gabapentin 100 mg at bedtime for numbness and pain.  Can increase as needed.  - Continue aspirin and plavix for stroke prevenetion.  - Continue atorvastatin 80 mg daily for cholesterol.  - Have blood work drawn today.  - Discuss copper IUD with gynecologist.  - Continue therapy.  - Drink lots of water.    Mediterranean Diet Recommendations    · Eat primarily plant-based foods, such as fruits and vegetables, whole grains, legumes (beans) and nuts.  · Limit refined carbohydrates (white pasta, bread, rice).  · Replace butter with healthy fats such as olive oil.  · Use herbs and spices instead of salt to flavor foods.  · Limit red meat and processed meats to no more than a few times a month.  · Avoid sugary sodas, bakery goods, and sweets.  · Eat fish and poultry at least twice a week.  · Get plenty of exercise (150 minutes per week).    Adopted from Chani beal al, NEJM, 2018.

## 2020-02-17 ENCOUNTER — OFFICE VISIT (OUTPATIENT)
Dept: NEUROSURGERY | Facility: CLINIC | Age: 40
End: 2020-02-17
Payer: MEDICAID

## 2020-02-17 ENCOUNTER — HOSPITAL ENCOUNTER (OUTPATIENT)
Dept: RADIOLOGY | Facility: HOSPITAL | Age: 40
Discharge: HOME | End: 2020-02-17
Attending: NEUROLOGICAL SURGERY
Payer: MEDICAID

## 2020-02-17 VITALS
BODY MASS INDEX: 26.95 KG/M2 | DIASTOLIC BLOOD PRESSURE: 55 MMHG | HEART RATE: 66 BPM | HEIGHT: 64 IN | SYSTOLIC BLOOD PRESSURE: 101 MMHG

## 2020-02-17 DIAGNOSIS — I63.512 CEREBRAL INFARCTION DUE TO OCCLUSION OF LEFT MIDDLE CEREBRAL ARTERY: ICD-10-CM

## 2020-02-17 DIAGNOSIS — I63.9 CEREBROVASCULAR ACCIDENT (CVA), UNSPECIFIED MECHANISM: ICD-10-CM

## 2020-02-17 DIAGNOSIS — I63.512 CEREBRAL INFARCTION DUE TO OCCLUSION OF LEFT MIDDLE CEREBRAL ARTERY: Primary | ICD-10-CM

## 2020-02-17 PROCEDURE — 99213 PR OFFICE/OUTPT VISIT, EST, LEVL III, 20-29 MIN: ICD-10-PCS | Mod: S$PBB,,, | Performed by: NEUROLOGICAL SURGERY

## 2020-02-17 PROCEDURE — 25500020 PHARM REV CODE 255: Performed by: NEUROLOGICAL SURGERY

## 2020-02-17 PROCEDURE — 99213 OFFICE O/P EST LOW 20 MIN: CPT | Mod: S$PBB,,, | Performed by: NEUROLOGICAL SURGERY

## 2020-02-17 PROCEDURE — 99999 PR PBB SHADOW E&M-EST. PATIENT-LVL III: CPT | Mod: PBBFAC,,, | Performed by: NEUROLOGICAL SURGERY

## 2020-02-17 PROCEDURE — 99999 PR PBB SHADOW E&M-EST. PATIENT-LVL III: ICD-10-PCS | Mod: PBBFAC,,, | Performed by: NEUROLOGICAL SURGERY

## 2020-02-17 PROCEDURE — A9585 GADOBUTROL INJECTION: HCPCS | Performed by: NEUROLOGICAL SURGERY

## 2020-02-17 PROCEDURE — 70553 MRI BRAIN (TUMOR WITH PERFUSION) W W/O CONTRAST (XPD): ICD-10-PCS | Mod: 26,,, | Performed by: RADIOLOGY

## 2020-02-17 PROCEDURE — 99213 OFFICE O/P EST LOW 20 MIN: CPT | Mod: PBBFAC,25 | Performed by: NEUROLOGICAL SURGERY

## 2020-02-17 PROCEDURE — 70553 MRI BRAIN STEM W/O & W/DYE: CPT | Mod: TC

## 2020-02-17 PROCEDURE — 70553 MRI BRAIN STEM W/O & W/DYE: CPT | Mod: 26,,, | Performed by: RADIOLOGY

## 2020-02-17 RX ORDER — HYDROCODONE BITARTRATE AND ACETAMINOPHEN 5; 325 MG/1; MG/1
1 TABLET ORAL EVERY 6 HOURS PRN
COMMUNITY
Start: 2020-01-22 | End: 2021-12-30

## 2020-02-17 RX ORDER — GADOBUTROL 604.72 MG/ML
10 INJECTION INTRAVENOUS
Status: COMPLETED | OUTPATIENT
Start: 2020-02-17 | End: 2020-02-17

## 2020-02-17 RX ORDER — FLUTICASONE PROPIONATE 50 MCG
1 SPRAY, SUSPENSION (ML) NASAL DAILY PRN
COMMUNITY
Start: 2020-01-28

## 2020-02-17 RX ADMIN — GADOBUTROL 10 ML: 604.72 INJECTION INTRAVENOUS at 11:02

## 2020-02-17 NOTE — PROGRESS NOTES
Ally Ponce returned in neurosurgical followup to the office this afternoon.  She does not feel she has made much progress.  She has continued weakness on the right side and difficulty walking.  She is continuing with physical therapy.  She also has some speech difficulty.    On brief examination today she is alert and interactive.  Speech is generally fluent although somewhat limited.  She shows no pronator drift but alternating hand movements are slower on the right side.  She favors the right lower extremity.    MRI with perfusion studies were done at [facility] today.  Diffusion sequences show no acute infarction.  FLAIR sequence shows the previous stroke in the left insula.  This has shown some healing.  Contrast study shows no abnormal areas of contrast.  Perfusion studies show that cerebral blood flow and cerebral blood volume are maintained in both hemispheres.  Mean transit time and time to peak are somewhat delayed on the left consistent with her known middle cerebral artery occlusion.    The cerebral circulation seems to be adequately compensated at this point.  I do not believe a cerebral revascularization procedure is required.  I will plan to have cerebral angiography repeated this fall which will be 1 year from the time of her stroke.

## 2020-05-04 RX ORDER — GABAPENTIN 100 MG/1
CAPSULE ORAL
Qty: 90 CAPSULE | Refills: 3 | Status: SHIPPED | OUTPATIENT
Start: 2020-05-04 | End: 2020-08-25

## 2020-07-13 ENCOUNTER — OFFICE VISIT (OUTPATIENT)
Dept: NEUROLOGY | Facility: CLINIC | Age: 40
End: 2020-07-13
Payer: MEDICAID

## 2020-07-13 DIAGNOSIS — M79.605 PAIN IN BOTH LOWER EXTREMITIES: Primary | ICD-10-CM

## 2020-07-13 DIAGNOSIS — M79.604 PAIN IN BOTH LOWER EXTREMITIES: Primary | ICD-10-CM

## 2020-07-13 PROCEDURE — 99499 UNLISTED E&M SERVICE: CPT | Mod: 95,,, | Performed by: PSYCHIATRY & NEUROLOGY

## 2020-07-13 PROCEDURE — 99499 NO LOS: ICD-10-PCS | Mod: 95,,, | Performed by: PSYCHIATRY & NEUROLOGY

## 2020-07-14 ENCOUNTER — TELEPHONE (OUTPATIENT)
Dept: NEUROSURGERY | Facility: CLINIC | Age: 40
End: 2020-07-14

## 2020-07-14 DIAGNOSIS — Z01.812 ENCOUNTER FOR PRE-OPERATIVE LABORATORY TESTING: ICD-10-CM

## 2020-07-14 DIAGNOSIS — I63.512 CEREBRAL INFARCTION DUE TO OCCLUSION OF LEFT MIDDLE CEREBRAL ARTERY: Primary | ICD-10-CM

## 2020-07-14 DIAGNOSIS — I63.9 STROKE: ICD-10-CM

## 2020-07-14 NOTE — TELEPHONE ENCOUNTER
----- Message from Arturo Ingram sent at 7/14/2020 12:34 PM CDT -----  Contact: pt @ [phone]  Pt asking to speak w/ you about scheduling an angiogram in August

## 2020-07-15 NOTE — TELEPHONE ENCOUNTER
----- Message from Daphne Crandall sent at 7/15/2020  9:31 AM CDT -----  Contact: [phone]  Pt called in states she to mely with HERLINDA to discuss angiogram

## 2020-07-16 ENCOUNTER — LAB VISIT (OUTPATIENT)
Dept: LAB | Facility: HOSPITAL | Age: 40
End: 2020-07-16
Attending: PSYCHIATRY & NEUROLOGY
Payer: MEDICAID

## 2020-07-16 DIAGNOSIS — M79.604 PAIN IN BOTH LOWER EXTREMITIES: ICD-10-CM

## 2020-07-16 DIAGNOSIS — M79.605 PAIN IN BOTH LOWER EXTREMITIES: ICD-10-CM

## 2020-07-16 LAB
ALBUMIN SERPL BCP-MCNC: 3.9 G/DL (ref 3.5–5.2)
ALP SERPL-CCNC: 76 U/L (ref 55–135)
ALT SERPL W/O P-5'-P-CCNC: 15 U/L (ref 10–44)
ANION GAP SERPL CALC-SCNC: 7 MMOL/L (ref 8–16)
AST SERPL-CCNC: 16 U/L (ref 10–40)
BILIRUB SERPL-MCNC: 0.3 MG/DL (ref 0.1–1)
BUN SERPL-MCNC: 13 MG/DL (ref 6–20)
CALCIUM SERPL-MCNC: 9.3 MG/DL (ref 8.7–10.5)
CALCIUM SERPL-MCNC: 9.3 MG/DL (ref 8.7–10.5)
CHLORIDE SERPL-SCNC: 107 MMOL/L (ref 95–110)
CK SERPL-CCNC: 80 U/L (ref 20–180)
CO2 SERPL-SCNC: 26 MMOL/L (ref 23–29)
CREAT SERPL-MCNC: 0.8 MG/DL (ref 0.5–1.4)
EST. GFR  (AFRICAN AMERICAN): >60 ML/MIN/1.73 M^2
EST. GFR  (NON AFRICAN AMERICAN): >60 ML/MIN/1.73 M^2
GLUCOSE SERPL-MCNC: 85 MG/DL (ref 70–110)
MAGNESIUM SERPL-MCNC: 2 MG/DL (ref 1.6–2.6)
POTASSIUM SERPL-SCNC: 4.4 MMOL/L (ref 3.5–5.1)
PROT SERPL-MCNC: 7.3 G/DL (ref 6–8.4)
SODIUM SERPL-SCNC: 140 MMOL/L (ref 136–145)

## 2020-07-16 PROCEDURE — 80053 COMPREHEN METABOLIC PANEL: CPT

## 2020-07-16 PROCEDURE — 82550 ASSAY OF CK (CPK): CPT

## 2020-07-16 PROCEDURE — 83735 ASSAY OF MAGNESIUM: CPT

## 2020-07-16 PROCEDURE — 36415 COLL VENOUS BLD VENIPUNCTURE: CPT | Mod: PO

## 2020-07-23 ENCOUNTER — PATIENT MESSAGE (OUTPATIENT)
Dept: NEUROLOGY | Facility: HOSPITAL | Age: 40
End: 2020-07-23

## 2020-07-27 NOTE — PROGRESS NOTES
Vascular Neurology Video Visit  Unable to complete visit due to poor video quality      Reason For Visit (Chief Complaint): surgical clearance    HPI: Ms. Ponce is a 40 y.o. woman, previously healthy, who developed R hemiparesis and speech difficulty on 6/21/19, found to have acute L-BG stroke.  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    Patient called today for clearance for hysterectomy with gynecology (Dr. Yehuda Zelaya).  Hysterectomy was recommended due to symptoms of fatigue and weakness during menses.  Has not trialed any non-hormonal birth control for symptoms.  Denies heavy bleeding, anemia.  Has been doing well recently, other than frequent leg pain / cramping.    Plan: Advised patient that surgery for elective purposes (without attempting non-surgical management first), may be too risky.  She is at high risk for tanya-operative stroke given her Leslie Leslie disease.  She does not express a strong desire for this surgery, and agrees to discuss non-surgical treatment options with Ob/Gyn.  In regards to leg pain, will check electrolytes, CPK (as she is on statin).  No change to current medications.    Current Outpatient Medications on File Prior to Visit   Medication Sig Dispense Refill    aspirin (ECOTRIN) 325 MG EC tablet Take 1 tablet (325 mg total) by mouth once daily.  0    atorvastatin (LIPITOR) 80 MG tablet Take 1 tablet (80 mg total) by mouth once daily. 90 tablet 3    baclofen (LIORESAL) 10 MG tablet Take 5 mg by mouth 2 (two) times daily as needed.  1    clopidogrel (PLAVIX) 75 mg tablet Take 75 mg by mouth once daily.      cyanocobalamin, vitamin B-12, (VITAMIN B-12) 50 mcg tablet Take 50 mcg by mouth once daily.      FLUoxetine 20 MG capsule Take 1 capsule (20 mg total) by mouth every evening. 30 capsule 11    fluticasone propionate (FLONASE) 50 mcg/actuation nasal spray USE ONE SPRAY in BOTH nostrils TWICE DAILY      gabapentin (NEURONTIN) 100 MG capsule TAKE ONE CAPSULE BY MOUTH THREE  TIMES DAILY 90 capsule 3    HYDROcodone-acetaminophen (NORCO) 5-325 mg per tablet       montelukast (SINGULAIR) 10 mg tablet Take 10 mg by mouth every evening.      ondansetron (ZOFRAN) 4 MG tablet Take 1 tablet (4 mg total) by mouth every 8 (eight) hours as needed. 90 tablet 0    pantoprazole (PROTONIX) 40 MG tablet TAKE ONE TABLET BY MOUTH DAILY, Instr TRY 14 DAY TRIAL AT FIRST ONSET OF SYMPTONS  2     No current facility-administered medications on file prior to visit.         Caridad Benjamin MD  Vascular Neurology

## 2020-07-29 DIAGNOSIS — R29.818 OTHER SYMPTOMS AND SIGNS INVOLVING THE NERVOUS SYSTEM: ICD-10-CM

## 2020-07-29 DIAGNOSIS — I63.512 CEREBRAL INFARCTION DUE TO OCCLUSION OF LEFT MIDDLE CEREBRAL ARTERY: Primary | ICD-10-CM

## 2020-07-29 NOTE — PROGRESS NOTES
Received call from patient's sister.  In addition to b/l leg pain (which as been going on for some time), she developed R leg numbness yesterday, that has not resolved or improved.  No other new neurological symptoms.  Will obtain STAT outpatient MRI.  Advised her to take patient to ER if any new symptoms develop.    Caridad Benjamin MD  Vascular Neurology

## 2020-07-31 ENCOUNTER — TELEPHONE (OUTPATIENT)
Dept: NEUROLOGY | Facility: CLINIC | Age: 40
End: 2020-07-31

## 2020-07-31 NOTE — TELEPHONE ENCOUNTER
----- Message from Jeffrey Bob sent at 7/31/2020 12:07 PM CDT -----  Contact: Jeny ( sister ) @ [phone]  Caller calling to get an update on the MRI order, pls advise

## 2020-08-07 ENCOUNTER — HOSPITAL ENCOUNTER (OUTPATIENT)
Dept: RADIOLOGY | Facility: HOSPITAL | Age: 40
Discharge: HOME | End: 2020-08-07
Attending: PSYCHIATRY & NEUROLOGY
Payer: MEDICAID

## 2020-08-07 DIAGNOSIS — R29.818 OTHER SYMPTOMS AND SIGNS INVOLVING THE NERVOUS SYSTEM: ICD-10-CM

## 2020-08-07 PROCEDURE — 70551 MRI BRAIN STEM W/O DYE: CPT | Mod: TC,PO

## 2020-08-07 PROCEDURE — 70551 MRI BRAIN STEM W/O DYE: CPT | Mod: 26,,, | Performed by: RADIOLOGY

## 2020-08-07 PROCEDURE — 70551 MRI BRAIN WITHOUT CONTRAST: ICD-10-PCS | Mod: 26,,, | Performed by: RADIOLOGY

## 2020-08-17 ENCOUNTER — TELEPHONE (OUTPATIENT)
Dept: NEUROSURGERY | Facility: CLINIC | Age: 40
End: 2020-08-17

## 2020-08-17 DIAGNOSIS — I63.512 CEREBRAL INFARCTION DUE TO OCCLUSION OF LEFT MIDDLE CEREBRAL ARTERY: Primary | ICD-10-CM

## 2020-08-17 RX ORDER — FENTANYL CITRATE 50 UG/ML
50 INJECTION, SOLUTION INTRAMUSCULAR; INTRAVENOUS
Status: CANCELLED | OUTPATIENT
Start: 2020-08-17

## 2020-08-17 RX ORDER — MIDAZOLAM HYDROCHLORIDE 1 MG/ML
1 INJECTION INTRAMUSCULAR; INTRAVENOUS
Status: CANCELLED | OUTPATIENT
Start: 2020-08-17

## 2020-08-17 RX ORDER — HEPARIN SODIUM 1000 [USP'U]/ML
5000 INJECTION, SOLUTION INTRAVENOUS; SUBCUTANEOUS ONCE
Status: CANCELLED | OUTPATIENT
Start: 2020-08-17 | End: 2020-08-17

## 2020-08-17 NOTE — TELEPHONE ENCOUNTER
----- Message from Reyna Alvarez sent at 8/17/2020 12:50 PM CDT -----  Regarding: pt advic  Contact: pt @ [phone]  Pt asking to speak with someone regarding additional testing she may need prior to her visit. Please call.

## 2020-08-17 NOTE — NURSING
Pre-op call complete.     Pre procedure instructions given for cerebral angiogram. Pt instructed not to eat or drink after midnight. Allergies reviewed. Jeny to provide transport and monitor pt 8 hrs. Pt reports anticoagulation medications, ASA 81 mg & plavix. Home medications reviewed with patient. Pt instructed to check in to second floor radiology/lab desk to have blood work drawn at 9am then to proceed to [facility] waiting area. Pt denied postioning restrictions.  Expected length of stay reviewed.  Covid screening complete.  Pt verbalizes understanding. Questions answered.

## 2020-08-18 ENCOUNTER — HOSPITAL ENCOUNTER (OUTPATIENT)
Facility: HOSPITAL | Age: 40
Discharge: HOME | End: 2020-08-18
Attending: RADIOLOGY | Admitting: RADIOLOGY
Payer: MEDICAID

## 2020-08-18 VITALS
SYSTOLIC BLOOD PRESSURE: 98 MMHG | TEMPERATURE: 98 F | OXYGEN SATURATION: 99 % | WEIGHT: 160 LBS | BODY MASS INDEX: 27.31 KG/M2 | RESPIRATION RATE: 12 BRPM | HEIGHT: 64 IN | HEART RATE: 51 BPM | DIASTOLIC BLOOD PRESSURE: 64 MMHG

## 2020-08-18 DIAGNOSIS — I63.512 CEREBRAL INFARCTION DUE TO OCCLUSION OF LEFT MIDDLE CEREBRAL ARTERY: ICD-10-CM

## 2020-08-18 LAB
B-HCG UR QL: NEGATIVE
CTP QC/QA: YES

## 2020-08-18 PROCEDURE — 81025 URINE PREGNANCY TEST: CPT | Performed by: FAMILY MEDICINE

## 2020-08-18 PROCEDURE — 63600175 PHARM REV CODE 636 W HCPCS: Performed by: RADIOLOGY

## 2020-08-18 RX ORDER — MIDAZOLAM HYDROCHLORIDE 1 MG/ML
INJECTION INTRAMUSCULAR; INTRAVENOUS CODE/TRAUMA/SEDATION MEDICATION
Status: COMPLETED | OUTPATIENT
Start: 2020-08-18 | End: 2020-08-18

## 2020-08-18 RX ORDER — FENTANYL CITRATE 50 UG/ML
INJECTION, SOLUTION INTRAMUSCULAR; INTRAVENOUS CODE/TRAUMA/SEDATION MEDICATION
Status: COMPLETED | OUTPATIENT
Start: 2020-08-18 | End: 2020-08-18

## 2020-08-18 RX ORDER — SODIUM CHLORIDE 9 MG/ML
INJECTION, SOLUTION INTRAVENOUS CONTINUOUS
Status: DISCONTINUED | OUTPATIENT
Start: 2020-08-18 | End: 2020-08-18 | Stop reason: HOSPADM

## 2020-08-18 RX ORDER — SODIUM CHLORIDE 0.9 % (FLUSH) 0.9 %
10 SYRINGE (ML) INJECTION
Status: DISCONTINUED | OUTPATIENT
Start: 2020-08-18 | End: 2020-08-18 | Stop reason: HOSPADM

## 2020-08-18 RX ADMIN — MIDAZOLAM HYDROCHLORIDE 0.5 MG: 1 INJECTION, SOLUTION INTRAMUSCULAR; INTRAVENOUS at 02:08

## 2020-08-18 RX ADMIN — FENTANYL CITRATE 50 MCG: 50 INJECTION, SOLUTION INTRAMUSCULAR; INTRAVENOUS at 02:08

## 2020-08-18 RX ADMIN — MIDAZOLAM HYDROCHLORIDE 1 MG: 1 INJECTION, SOLUTION INTRAMUSCULAR; INTRAVENOUS at 03:08

## 2020-08-18 RX ADMIN — MIDAZOLAM HYDROCHLORIDE 1 MG: 1 INJECTION, SOLUTION INTRAMUSCULAR; INTRAVENOUS at 02:08

## 2020-08-18 RX ADMIN — FENTANYL CITRATE 50 MCG: 50 INJECTION, SOLUTION INTRAMUSCULAR; INTRAVENOUS at 03:08

## 2020-08-18 NOTE — DISCHARGE INSTRUCTIONS
For scheduling: Call Yvette at [phone]    For questions or concerns call: Radiology resident on call [phone].    For immediate concerns that are not emergent, you may call our radiology clinic at: [phone]

## 2020-08-18 NOTE — SEDATION DOCUMENTATION
Hemostasis achieved via right groin with use of angioSeal closure device at  1528. Pt to lay flat for 2 hours until 1728

## 2020-08-18 NOTE — NURSING
Pt received verbal and physical discharge instructions. VSS. Procedure site c/d/i and care instructions provided to the patient. Questions encouraged and answered. Pt discharging home with a caregiver.

## 2020-08-18 NOTE — H&P
Radiology History & Physical      SUBJECTIVE:     Chief Complaint: right leg weakness    History of Present Illness:  Ally Ponce is a 40 y.o. female who presents for diagnostic cerebral angiogram in patient with right leg numbness and weakness with left MCA stenosis.     Past Medical History:   Diagnosis Date    Dysarthria     Hemiparesis affecting right side as late effect of cerebrovascular accident (CVA)     High cholesterol     Hyperreflexia of lower extremity     Moyamoya     Stroke     x2     Past Surgical History:   Procedure Laterality Date    CEREBRAL ANGIOGRAM N/A 10/29/2019    Procedure: ANGIOGRAM-CEREBRAL;  Surgeon: Bernarda Diagnostic Provider;  Location: [facility] OR [address] [place] [address];  Service: Radiology;  Laterality: N/A;  Rm 190/Felicia    TUBAL LIGATION  2005       Home Meds:   Prior to Admission medications    Medication Sig Start Date End Date Taking? Authorizing Provider   aspirin (ECOTRIN) 325 MG EC tablet Take 1 tablet (325 mg total) by mouth once daily. 8/5/19 8/18/20 Yes Charmaine You PA-C   atorvastatin (LIPITOR) 80 MG tablet Take 1 tablet (80 mg total) by mouth once daily. 8/5/19 8/18/20 Yes Charmaine You PA-C   baclofen (LIORESAL) 10 MG tablet Take 5 mg by mouth 2 (two) times daily as needed. 9/10/19  Yes Historical Provider, MD   clopidogrel (PLAVIX) 75 mg tablet Take 75 mg by mouth once daily.   Yes Historical Provider, MD   cyanocobalamin, vitamin B-12, (VITAMIN B-12) 50 mcg tablet Take 50 mcg by mouth once daily.   Yes Historical Provider, MD   FLUoxetine 20 MG capsule Take 1 capsule (20 mg total) by mouth every evening. 8/5/19 8/18/20 Yes Charmaine You PA-C   fluticasone propionate (FLONASE) 50 mcg/actuation nasal spray USE ONE SPRAY in BOTH nostrils TWICE DAILY 1/28/20  Yes Historical Provider, MD   gabapentin (NEURONTIN) 100 MG capsule TAKE ONE CAPSULE BY MOUTH THREE TIMES DAILY 5/4/20  Yes Caridad Benjamin MD   HYDROcodone-acetaminophen (NORCO) 5-325 mg per tablet  1/22/20  Yes  Historical Provider, MD   montelukast (SINGULAIR) 10 mg tablet Take 10 mg by mouth every evening.   Yes Historical Provider, MD   ondansetron (ZOFRAN) 4 MG tablet Take 1 tablet (4 mg total) by mouth every 8 (eight) hours as needed. 8/5/19  Yes Charmaine You PA-C   pantoprazole (PROTONIX) 40 MG tablet TAKE ONE TABLET BY MOUTH DAILY, Instr TRY 14 DAY TRIAL AT FIRST ONSET OF SYMPTONS 9/10/19  Yes Historical Provider, MD     Anticoagulants/Antiplatelets: no anticoagulation    Allergies: Review of patient's allergies indicates:  No Known Allergies  Sedation History:  no adverse reactions    Review of Systems:   Hematological: no known coagulopathies  Respiratory: no shortness of breath  Cardiovascular: no chest pain  Gastrointestinal: no abdominal pain  Genito-Urinary: no dysuria  Musculoskeletal: negative  Neurological: no TIA or stroke symptoms         OBJECTIVE:     Vital Signs (Most Recent)  Temp: 98.2 °F (36.8 °C) (08/18/20 1227)  Pulse: (!) 55 (08/18/20 1525)  Resp: 18 (08/18/20 1525)  BP: 101/62 (08/18/20 1525)  SpO2: 99 % (08/18/20 1525)    Physical Exam:  ASA: 2  Mallampati: 2    General: no acute distress  Mental Status: alert and oriented to person, place and time  HEENT: normocephalic, atraumatic  Chest: unlabored breathing  Heart: regular heart rate  Abdomen: nondistended  Extremity: moves all extremities    Laboratory  Lab Results   Component Value Date    INR 0.9 08/18/2020       Lab Results   Component Value Date    WBC 11.74 08/18/2020    HGB 12.7 08/18/2020    HCT 41.9 08/18/2020    MCV 92 08/18/2020     08/18/2020      Lab Results   Component Value Date    GLU 90 08/18/2020     08/18/2020    K 4.0 08/18/2020     08/18/2020    CO2 23 08/18/2020    BUN 13 08/18/2020    CREATININE 0.8 08/18/2020    CALCIUM 9.2 08/18/2020    MG 2.0 07/16/2020    ALT 14 08/18/2020    AST 13 08/18/2020    ALBUMIN 3.9 08/18/2020    BILITOT 0.3 08/18/2020       ASSESSMENT/PLAN:     Sedation Plan: moderate  sedation  Patient will undergo : diagnostic cerebral angiogram.    Renny Stapleton MD  NeuroIR fellow.

## 2020-08-18 NOTE — PROCEDURES
Neurointerventional Radiology Post-Procedure Note    Pre Op Diagnosis: Left MCA acute ischemic stroke     Post Op Diagnosis: Same as above     Procedure: Cerebral angiogram    Procedure performed by: Felicia GLYNN, Aashish Stapleton MD.    Written Informed Consent Obtained: Yes    Specimen Removed: NO    Estimated Blood Loss: less than 50     Procedure report:     A 5F sheath was placed into the right femoral artery and a 5F James catheter was advanced into the aortic arch.  The bilateral ICA, ECA and vertebral arteries were subselected and angiography of the brain was performed after injection into each of these vessels.    Preliminary interpretation: Left MCA parietal lobe slow flow. Proximal left M1 segment is robust without any stenosis. Please see Imaging report for full details.    A right femoral artery angiogram was performed, the sheath removed and hemostasis achieved using Angioseal. No hematoma was present at the time of hemostasis.    The patient tolerated the procedure well.     Renny Stapleton MD  NeuroInterventional Radiology Fellow

## 2020-08-18 NOTE — DISCHARGE SUMMARY
Radiology Discharge Summary      Hospital Course: No complications    Admit Date: 8/18/2020  Discharge Date: 08/18/2020     Instructions Given to Patient: Yes  Diet: Resume prior diet  Activity: activity as tolerated    Description of Condition on Discharge: Stable  Vital Signs (Most Recent): Temp: 98.2 °F (36.8 °C) (08/18/20 1227)  Pulse: (!) 50 (08/18/20 1530)  Resp: 18 (08/18/20 1530)  BP: 103/65 (08/18/20 1530)  SpO2: 99 % (08/18/20 1530)    Discharge Disposition: Home    Discharge Diagnosis: Left MCA stroke     Follow-up: with Dr. Rayray Stapleton MD  NeuroIR fellow.

## 2020-08-21 ENCOUNTER — TELEPHONE (OUTPATIENT)
Dept: NEUROSURGERY | Facility: CLINIC | Age: 40
End: 2020-08-21

## 2020-08-21 NOTE — TELEPHONE ENCOUNTER
----- Message from Shonna Forman sent at 8/21/2020 10:33 AM CDT -----  Contact: self @ [phone]  Pt is scheduled to f/u on 8-24-20 at 11:00.  Pt is calling to see if she can change the appt to a Virtual appt.  Pls call.

## 2020-08-24 ENCOUNTER — OFFICE VISIT (OUTPATIENT)
Dept: NEUROSURGERY | Facility: CLINIC | Age: 40
End: 2020-08-24
Payer: MEDICAID

## 2020-08-24 DIAGNOSIS — I63.512 CEREBRAL INFARCTION DUE TO OCCLUSION OF LEFT MIDDLE CEREBRAL ARTERY: Primary | ICD-10-CM

## 2020-08-24 PROCEDURE — 99213 OFFICE O/P EST LOW 20 MIN: CPT | Mod: 95,,, | Performed by: NEUROLOGICAL SURGERY

## 2020-08-24 PROCEDURE — 99213 PR OFFICE/OUTPT VISIT, EST, LEVL III, 20-29 MIN: ICD-10-PCS | Mod: 95,,, | Performed by: NEUROLOGICAL SURGERY

## 2020-08-24 NOTE — PROGRESS NOTES
Ally Ponce was seen in neurosurgical follow-up by virtual visit today.  She has continued to have some intermittent numbness of the right lower extremity.  She has had no new symptoms in her arm and has noted no specific change in her speech.  She remains on aspirin and Plavix for stroke prevention.  She continues to be followed by Vascular Neurology.    Examination was limited by virtual visit.  Her speech is clear although her answers are somewhat limited.  There does not seem to be any progression of her right-sided weakness.    Cerebral angiography was done by Dr. Duarte on 8/18/2020.  On the left internal carotid artery injection there is good filling of the left internal carotid and the M1 branch of the left middle cerebral artery.  The inferior M2 is also robust.  The territory of the superior M1 is filled by a prominent lenticulostriate arteries and some back fill from the anterior cerebral artery.  It is noted that she has well-developed scalp branches of the left external carotid artery. The right internal carotid injection is unremarkable.  The right posterior cerebral artery is filled by the right ICA.  The left PCA is not well demonstrated.    The cerebral circulation seems fairly well compensated at this point and she has had no new neurological deficits.  I will plan to have a follow-up CT perfusion study done in about 6 months.  She should call if she has any new neurological events prior to that time.

## 2021-01-12 ENCOUNTER — TELEPHONE (OUTPATIENT)
Dept: NEUROLOGY | Facility: CLINIC | Age: 41
End: 2021-01-12

## 2021-01-13 RX ORDER — METHYLPREDNISOLONE 4 MG/1
TABLET ORAL
Qty: 1 PACKAGE | Refills: 0 | Status: SHIPPED | OUTPATIENT
Start: 2021-01-13 | End: 2021-02-03

## 2021-02-02 ENCOUNTER — OFFICE VISIT (OUTPATIENT)
Dept: NEUROLOGY | Facility: CLINIC | Age: 41
End: 2021-02-02
Payer: MEDICAID

## 2021-02-02 DIAGNOSIS — G44.89 OTHER HEADACHE SYNDROME: Primary | ICD-10-CM

## 2021-02-02 DIAGNOSIS — I67.5 MOYA MOYA DISEASE: ICD-10-CM

## 2021-02-02 DIAGNOSIS — I69.351 HEMIPARESIS OF RIGHT DOMINANT SIDE AS LATE EFFECT OF CEREBRAL INFARCTION: ICD-10-CM

## 2021-02-02 DIAGNOSIS — Z86.73 HISTORY OF ISCHEMIC LEFT MCA STROKE: ICD-10-CM

## 2021-02-02 PROCEDURE — 99213 OFFICE O/P EST LOW 20 MIN: CPT | Mod: 95,,, | Performed by: PSYCHIATRY & NEUROLOGY

## 2021-02-02 PROCEDURE — 99213 PR OFFICE/OUTPT VISIT, EST, LEVL III, 20-29 MIN: ICD-10-PCS | Mod: 95,,, | Performed by: PSYCHIATRY & NEUROLOGY

## 2021-02-05 RX ORDER — POLYETHYLENE GLYCOL 3350 17 G/17G
17 POWDER, FOR SOLUTION ORAL DAILY PRN
Qty: 30 PACKET | Refills: 3 | Status: SHIPPED | OUTPATIENT
Start: 2021-02-05

## 2021-02-05 RX ORDER — AMITRIPTYLINE HYDROCHLORIDE 25 MG/1
25 TABLET, FILM COATED ORAL NIGHTLY
Qty: 30 TABLET | Refills: 3 | Status: SHIPPED | OUTPATIENT
Start: 2021-02-05 | End: 2022-06-14

## 2021-02-16 PROBLEM — I63.512 CEREBRAL INFARCTION DUE TO OCCLUSION OF LEFT MIDDLE CEREBRAL ARTERY: Status: RESOLVED | Noted: 2019-10-29 | Resolved: 2021-02-16

## 2021-06-29 ENCOUNTER — TELEPHONE (OUTPATIENT)
Dept: NEUROLOGY | Facility: CLINIC | Age: 41
End: 2021-06-29

## 2021-06-29 ENCOUNTER — PATIENT MESSAGE (OUTPATIENT)
Dept: NEUROLOGY | Facility: CLINIC | Age: 41
End: 2021-06-29

## 2021-06-29 NOTE — TELEPHONE ENCOUNTER
----- Message from Jeffrey Bob sent at 6/29/2021  8:45 AM CDT -----  Contact: @[phone]  Patient requesting a return call about moving forward with surgery ( hysterectomy ) and medication   ( Plavix ) restrictions, pls call to discuss further

## 2021-06-30 NOTE — TELEPHONE ENCOUNTER
Pt wants to know if  wants Pt to have hysterectomy done. Pt states she is having blood clots, and she feels drained

## 2021-07-01 ENCOUNTER — TELEPHONE (OUTPATIENT)
Dept: NEUROLOGY | Facility: CLINIC | Age: 41
End: 2021-07-01

## 2021-07-02 ENCOUNTER — TELEPHONE (OUTPATIENT)
Dept: NEUROLOGY | Facility: CLINIC | Age: 41
End: 2021-07-02

## 2021-07-15 ENCOUNTER — TELEPHONE (OUTPATIENT)
Dept: OBSTETRICS AND GYNECOLOGY | Facility: CLINIC | Age: 41
End: 2021-07-15

## 2021-08-02 ENCOUNTER — OFFICE VISIT (OUTPATIENT)
Dept: OBSTETRICS AND GYNECOLOGY | Facility: CLINIC | Age: 41
End: 2021-08-02
Payer: MEDICAID

## 2021-08-02 ENCOUNTER — HOSPITAL ENCOUNTER (OUTPATIENT)
Dept: RADIOLOGY | Facility: HOSPITAL | Age: 41
Discharge: HOME | End: 2021-08-02
Attending: SPECIALIST
Payer: MEDICAID

## 2021-08-02 VITALS
BODY MASS INDEX: 26.69 KG/M2 | WEIGHT: 156.31 LBS | RESPIRATION RATE: 16 BRPM | HEIGHT: 64 IN | SYSTOLIC BLOOD PRESSURE: 102 MMHG | DIASTOLIC BLOOD PRESSURE: 60 MMHG

## 2021-08-02 DIAGNOSIS — N92.0 MENORRHAGIA WITH REGULAR CYCLE: ICD-10-CM

## 2021-08-02 DIAGNOSIS — Z01.419 ENCOUNTER FOR ROUTINE GYNECOLOGICAL EXAMINATION WITH PAPANICOLAOU SMEAR OF CERVIX: Primary | ICD-10-CM

## 2021-08-02 PROCEDURE — 76830 US PELVIS COMP WITH TRANSVAG NON-OB (XPD): ICD-10-PCS | Mod: 26,,, | Performed by: RADIOLOGY

## 2021-08-02 PROCEDURE — 99203 PR OFFICE/OUTPT VISIT, NEW, LEVL III, 30-44 MIN: ICD-10-PCS | Mod: S$PBB,,, | Performed by: SPECIALIST

## 2021-08-02 PROCEDURE — 76856 US PELVIS COMP WITH TRANSVAG NON-OB (XPD): ICD-10-PCS | Mod: 26,,, | Performed by: RADIOLOGY

## 2021-08-02 PROCEDURE — 76856 US EXAM PELVIC COMPLETE: CPT | Mod: 26,,, | Performed by: RADIOLOGY

## 2021-08-02 PROCEDURE — 99999 PR PBB SHADOW E&M-EST. PATIENT-LVL IV: CPT | Mod: PBBFAC,,, | Performed by: SPECIALIST

## 2021-08-02 PROCEDURE — 99203 OFFICE O/P NEW LOW 30 MIN: CPT | Mod: S$PBB,,, | Performed by: SPECIALIST

## 2021-08-02 PROCEDURE — 76856 US EXAM PELVIC COMPLETE: CPT | Mod: TC,PO

## 2021-08-02 PROCEDURE — 87624 HPV HI-RISK TYP POOLED RSLT: CPT | Performed by: SPECIALIST

## 2021-08-02 PROCEDURE — 76830 TRANSVAGINAL US NON-OB: CPT | Mod: 26,,, | Performed by: RADIOLOGY

## 2021-08-02 PROCEDURE — 88175 CYTOPATH C/V AUTO FLUID REDO: CPT | Performed by: SPECIALIST

## 2021-08-02 PROCEDURE — 99214 OFFICE O/P EST MOD 30 MIN: CPT | Mod: PBBFAC,PN,25 | Performed by: SPECIALIST

## 2021-08-02 PROCEDURE — 99999 PR PBB SHADOW E&M-EST. PATIENT-LVL IV: ICD-10-PCS | Mod: PBBFAC,,, | Performed by: SPECIALIST

## 2021-08-06 ENCOUNTER — TELEPHONE (OUTPATIENT)
Dept: OBSTETRICS AND GYNECOLOGY | Facility: CLINIC | Age: 41
End: 2021-08-06

## 2021-09-02 ENCOUNTER — PATIENT MESSAGE (OUTPATIENT)
Dept: NEUROLOGY | Facility: CLINIC | Age: 41
End: 2021-09-02

## 2021-09-15 ENCOUNTER — OFFICE VISIT (OUTPATIENT)
Dept: OBSTETRICS AND GYNECOLOGY | Facility: CLINIC | Age: 41
End: 2021-09-15
Payer: MEDICAID

## 2021-09-15 VITALS
WEIGHT: 158.06 LBS | BODY MASS INDEX: 26.98 KG/M2 | HEIGHT: 64 IN | SYSTOLIC BLOOD PRESSURE: 122 MMHG | DIASTOLIC BLOOD PRESSURE: 68 MMHG

## 2021-09-15 DIAGNOSIS — Z01.812 PRE-PROCEDURE LAB EXAM: Primary | ICD-10-CM

## 2021-09-15 DIAGNOSIS — R30.0 BURNING WITH URINATION: ICD-10-CM

## 2021-09-15 DIAGNOSIS — R93.89 THICKENED ENDOMETRIUM: ICD-10-CM

## 2021-09-15 LAB
B-HCG UR QL: NEGATIVE
BILIRUB SERPL-MCNC: NORMAL MG/DL
BLOOD URINE, POC: 50
CLARITY, POC UA: NORMAL
COLOR, POC UA: YELLOW
CTP QC/QA: YES
GLUCOSE UR QL STRIP: NORMAL
KETONES UR QL STRIP: NORMAL
LEUKOCYTE ESTERASE URINE, POC: NORMAL
NITRITE, POC UA: NORMAL
PH, POC UA: 8
PROTEIN, POC: NORMAL
SPECIFIC GRAVITY, POC UA: NORMAL
UROBILINOGEN, POC UA: NORMAL

## 2021-09-15 PROCEDURE — 87088 URINE BACTERIA CULTURE: CPT | Performed by: SPECIALIST

## 2021-09-15 PROCEDURE — 58100 PR BIOPSY OF UTERUS LINING: ICD-10-PCS | Mod: S$PBB,,, | Performed by: SPECIALIST

## 2021-09-15 PROCEDURE — 81002 URINALYSIS NONAUTO W/O SCOPE: CPT | Mod: PBBFAC,PN | Performed by: SPECIALIST

## 2021-09-15 PROCEDURE — 88305 TISSUE EXAM BY PATHOLOGIST: CPT | Performed by: PATHOLOGY

## 2021-09-15 PROCEDURE — 81025 URINE PREGNANCY TEST: CPT | Mod: PBBFAC,PN | Performed by: SPECIALIST

## 2021-09-15 PROCEDURE — 99213 OFFICE O/P EST LOW 20 MIN: CPT | Mod: PBBFAC,PN | Performed by: SPECIALIST

## 2021-09-15 PROCEDURE — 99999 PR PBB SHADOW E&M-EST. PATIENT-LVL III: ICD-10-PCS | Mod: PBBFAC,,, | Performed by: SPECIALIST

## 2021-09-15 PROCEDURE — 99213 PR OFFICE/OUTPT VISIT, EST, LEVL III, 20-29 MIN: ICD-10-PCS | Mod: 25,S$PBB,, | Performed by: SPECIALIST

## 2021-09-15 PROCEDURE — 58100 BIOPSY OF UTERUS LINING: CPT | Mod: S$PBB,,, | Performed by: SPECIALIST

## 2021-09-15 PROCEDURE — 99213 OFFICE O/P EST LOW 20 MIN: CPT | Mod: 25,S$PBB,, | Performed by: SPECIALIST

## 2021-09-15 PROCEDURE — 88305 TISSUE EXAM BY PATHOLOGIST: CPT | Mod: 26,,, | Performed by: PATHOLOGY

## 2021-09-15 PROCEDURE — 87186 SC STD MICRODIL/AGAR DIL: CPT | Performed by: SPECIALIST

## 2021-09-15 PROCEDURE — 58100 BIOPSY OF UTERUS LINING: CPT | Mod: PBBFAC,PN | Performed by: SPECIALIST

## 2021-09-15 PROCEDURE — 87077 CULTURE AEROBIC IDENTIFY: CPT | Performed by: SPECIALIST

## 2021-09-15 PROCEDURE — 88305 TISSUE EXAM BY PATHOLOGIST: ICD-10-PCS | Mod: 26,,, | Performed by: PATHOLOGY

## 2021-09-15 PROCEDURE — 87086 URINE CULTURE/COLONY COUNT: CPT | Performed by: SPECIALIST

## 2021-09-15 PROCEDURE — 99999 PR PBB SHADOW E&M-EST. PATIENT-LVL III: CPT | Mod: PBBFAC,,, | Performed by: SPECIALIST

## 2021-09-18 LAB — BACTERIA UR CULT: ABNORMAL

## 2021-09-21 ENCOUNTER — PATIENT MESSAGE (OUTPATIENT)
Dept: OBSTETRICS AND GYNECOLOGY | Facility: CLINIC | Age: 41
End: 2021-09-21

## 2021-09-21 LAB
FINAL PATHOLOGIC DIAGNOSIS: NORMAL
GROSS: NORMAL
Lab: NORMAL

## 2021-09-22 ENCOUNTER — TELEPHONE (OUTPATIENT)
Dept: OBSTETRICS AND GYNECOLOGY | Facility: CLINIC | Age: 41
End: 2021-09-22

## 2021-09-27 ENCOUNTER — TELEPHONE (OUTPATIENT)
Dept: OBSTETRICS AND GYNECOLOGY | Facility: CLINIC | Age: 41
End: 2021-09-27

## 2021-09-28 ENCOUNTER — TELEPHONE (OUTPATIENT)
Dept: OBSTETRICS AND GYNECOLOGY | Facility: CLINIC | Age: 41
End: 2021-09-28

## 2021-09-28 DIAGNOSIS — Z01.818 PRE-OP TESTING: ICD-10-CM

## 2021-09-28 DIAGNOSIS — R93.89 THICKENED ENDOMETRIUM: Primary | ICD-10-CM

## 2021-10-01 DIAGNOSIS — Z12.31 VISIT FOR SCREENING MAMMOGRAM: Primary | ICD-10-CM

## 2021-10-05 ENCOUNTER — TELEPHONE (OUTPATIENT)
Dept: OBSTETRICS AND GYNECOLOGY | Facility: CLINIC | Age: 41
End: 2021-10-05

## 2021-10-05 DIAGNOSIS — Z12.31 VISIT FOR SCREENING MAMMOGRAM: Primary | ICD-10-CM

## 2021-11-05 ENCOUNTER — OFFICE VISIT (OUTPATIENT)
Dept: OBSTETRICS AND GYNECOLOGY | Facility: CLINIC | Age: 41
End: 2021-11-05
Payer: MEDICAID

## 2021-11-05 VITALS
DIASTOLIC BLOOD PRESSURE: 68 MMHG | SYSTOLIC BLOOD PRESSURE: 114 MMHG | WEIGHT: 161.81 LBS | BODY MASS INDEX: 27.63 KG/M2 | HEIGHT: 64 IN

## 2021-11-05 DIAGNOSIS — Z01.818 PREOP EXAMINATION: Primary | ICD-10-CM

## 2021-11-05 DIAGNOSIS — R93.89 THICKENED ENDOMETRIUM: ICD-10-CM

## 2021-11-05 PROCEDURE — 99999 PR PBB SHADOW E&M-EST. PATIENT-LVL IV: CPT | Mod: PBBFAC,,, | Performed by: SPECIALIST

## 2021-11-05 PROCEDURE — 99213 PR OFFICE/OUTPT VISIT, EST, LEVL III, 20-29 MIN: ICD-10-PCS | Mod: S$PBB,,, | Performed by: SPECIALIST

## 2021-11-05 PROCEDURE — 99214 OFFICE O/P EST MOD 30 MIN: CPT | Mod: PBBFAC,PN | Performed by: SPECIALIST

## 2021-11-05 PROCEDURE — 99213 OFFICE O/P EST LOW 20 MIN: CPT | Mod: S$PBB,,, | Performed by: SPECIALIST

## 2021-11-05 PROCEDURE — 99999 PR PBB SHADOW E&M-EST. PATIENT-LVL IV: ICD-10-PCS | Mod: PBBFAC,,, | Performed by: SPECIALIST

## 2021-11-05 RX ORDER — MUPIROCIN 20 MG/G
OINTMENT TOPICAL
Status: CANCELLED | OUTPATIENT
Start: 2021-11-05

## 2021-11-05 RX ORDER — SODIUM CHLORIDE 9 MG/ML
INJECTION, SOLUTION INTRAVENOUS CONTINUOUS
Status: CANCELLED | OUTPATIENT
Start: 2021-11-05

## 2021-11-08 ENCOUNTER — TELEPHONE (OUTPATIENT)
Dept: OBSTETRICS AND GYNECOLOGY | Facility: CLINIC | Age: 41
End: 2021-11-08

## 2021-11-08 PROBLEM — R93.89 THICKENED ENDOMETRIUM: Status: ACTIVE | Noted: 2021-11-08

## 2021-11-18 ENCOUNTER — OFFICE VISIT (OUTPATIENT)
Dept: OBSTETRICS AND GYNECOLOGY | Facility: CLINIC | Age: 41
End: 2021-11-18
Payer: MEDICAID

## 2021-11-18 VITALS
SYSTOLIC BLOOD PRESSURE: 118 MMHG | HEIGHT: 63 IN | BODY MASS INDEX: 27.81 KG/M2 | WEIGHT: 156.94 LBS | RESPIRATION RATE: 18 BRPM | DIASTOLIC BLOOD PRESSURE: 72 MMHG

## 2021-11-18 DIAGNOSIS — Z09 POSTOPERATIVE EXAMINATION: Primary | ICD-10-CM

## 2021-11-18 PROCEDURE — 99999 PR PBB SHADOW E&M-EST. PATIENT-LVL IV: ICD-10-PCS | Mod: PBBFAC,,, | Performed by: SPECIALIST

## 2021-11-18 PROCEDURE — 99024 PR POST-OP FOLLOW-UP VISIT: ICD-10-PCS | Mod: ,,, | Performed by: SPECIALIST

## 2021-11-18 PROCEDURE — 99024 POSTOP FOLLOW-UP VISIT: CPT | Mod: ,,, | Performed by: SPECIALIST

## 2021-11-18 PROCEDURE — 99999 PR PBB SHADOW E&M-EST. PATIENT-LVL IV: CPT | Mod: PBBFAC,,, | Performed by: SPECIALIST

## 2021-11-18 PROCEDURE — 99214 OFFICE O/P EST MOD 30 MIN: CPT | Mod: PBBFAC,PN | Performed by: SPECIALIST

## 2021-12-30 ENCOUNTER — OFFICE VISIT (OUTPATIENT)
Dept: OBSTETRICS AND GYNECOLOGY | Facility: CLINIC | Age: 41
End: 2021-12-30
Payer: MEDICAID

## 2021-12-30 VITALS
SYSTOLIC BLOOD PRESSURE: 104 MMHG | HEIGHT: 63 IN | BODY MASS INDEX: 28.21 KG/M2 | DIASTOLIC BLOOD PRESSURE: 62 MMHG | WEIGHT: 159.19 LBS

## 2021-12-30 DIAGNOSIS — Z09 POSTOPERATIVE EXAMINATION: Primary | ICD-10-CM

## 2021-12-30 PROCEDURE — 99024 PR POST-OP FOLLOW-UP VISIT: ICD-10-PCS | Mod: ,,, | Performed by: SPECIALIST

## 2021-12-30 PROCEDURE — 99024 POSTOP FOLLOW-UP VISIT: CPT | Mod: ,,, | Performed by: SPECIALIST

## 2021-12-30 PROCEDURE — 3008F BODY MASS INDEX DOCD: CPT | Mod: CPTII,,, | Performed by: SPECIALIST

## 2021-12-30 PROCEDURE — 99999 PR PBB SHADOW E&M-EST. PATIENT-LVL III: CPT | Mod: PBBFAC,,, | Performed by: SPECIALIST

## 2021-12-30 PROCEDURE — 1159F MED LIST DOCD IN RCRD: CPT | Mod: CPTII,,, | Performed by: SPECIALIST

## 2021-12-30 PROCEDURE — 1159F PR MEDICATION LIST DOCUMENTED IN MEDICAL RECORD: ICD-10-PCS | Mod: CPTII,,, | Performed by: SPECIALIST

## 2021-12-30 PROCEDURE — 3078F DIAST BP <80 MM HG: CPT | Mod: CPTII,,, | Performed by: SPECIALIST

## 2021-12-30 PROCEDURE — 3074F PR MOST RECENT SYSTOLIC BLOOD PRESSURE < 130 MM HG: ICD-10-PCS | Mod: CPTII,,, | Performed by: SPECIALIST

## 2021-12-30 PROCEDURE — 99213 OFFICE O/P EST LOW 20 MIN: CPT | Mod: PBBFAC,PN | Performed by: SPECIALIST

## 2021-12-30 PROCEDURE — 3078F PR MOST RECENT DIASTOLIC BLOOD PRESSURE < 80 MM HG: ICD-10-PCS | Mod: CPTII,,, | Performed by: SPECIALIST

## 2021-12-30 PROCEDURE — 99999 PR PBB SHADOW E&M-EST. PATIENT-LVL III: ICD-10-PCS | Mod: PBBFAC,,, | Performed by: SPECIALIST

## 2021-12-30 PROCEDURE — 3008F PR BODY MASS INDEX (BMI) DOCUMENTED: ICD-10-PCS | Mod: CPTII,,, | Performed by: SPECIALIST

## 2021-12-30 PROCEDURE — 3074F SYST BP LT 130 MM HG: CPT | Mod: CPTII,,, | Performed by: SPECIALIST

## 2022-05-04 ENCOUNTER — TELEPHONE (OUTPATIENT)
Dept: NEUROLOGY | Facility: CLINIC | Age: 42
End: 2022-05-04

## 2022-05-04 NOTE — TELEPHONE ENCOUNTER
----- Message from Javier Kong sent at 5/4/2022 11:05 AM CDT -----  Regarding: sched an appt      The Pt states that she is having pain in her head and will need to sched an appt    Ph # [phone]

## 2022-06-14 ENCOUNTER — OFFICE VISIT (OUTPATIENT)
Dept: NEUROLOGY | Facility: CLINIC | Age: 42
End: 2022-06-14
Payer: MEDICAID

## 2022-06-14 VITALS
DIASTOLIC BLOOD PRESSURE: 65 MMHG | WEIGHT: 160.94 LBS | SYSTOLIC BLOOD PRESSURE: 101 MMHG | BODY MASS INDEX: 29.62 KG/M2 | HEIGHT: 62 IN | HEART RATE: 58 BPM

## 2022-06-14 DIAGNOSIS — I69.351 HEMIPARESIS OF RIGHT DOMINANT SIDE AS LATE EFFECT OF CEREBRAL INFARCTION: ICD-10-CM

## 2022-06-14 DIAGNOSIS — R51.9 CHRONIC RIGHT-SIDED HEADACHES: ICD-10-CM

## 2022-06-14 DIAGNOSIS — Z86.73 HISTORY OF ISCHEMIC LEFT MCA STROKE: Primary | ICD-10-CM

## 2022-06-14 DIAGNOSIS — G89.29 CHRONIC RIGHT-SIDED HEADACHES: ICD-10-CM

## 2022-06-14 PROCEDURE — 3008F PR BODY MASS INDEX (BMI) DOCUMENTED: ICD-10-PCS | Mod: CPTII,,, | Performed by: PSYCHIATRY & NEUROLOGY

## 2022-06-14 PROCEDURE — 3074F SYST BP LT 130 MM HG: CPT | Mod: CPTII,,, | Performed by: PSYCHIATRY & NEUROLOGY

## 2022-06-14 PROCEDURE — 99999 PR PBB SHADOW E&M-EST. PATIENT-LVL IV: ICD-10-PCS | Mod: PBBFAC,,, | Performed by: PSYCHIATRY & NEUROLOGY

## 2022-06-14 PROCEDURE — 1159F MED LIST DOCD IN RCRD: CPT | Mod: CPTII,,, | Performed by: PSYCHIATRY & NEUROLOGY

## 2022-06-14 PROCEDURE — 99214 OFFICE O/P EST MOD 30 MIN: CPT | Mod: PBBFAC | Performed by: PSYCHIATRY & NEUROLOGY

## 2022-06-14 PROCEDURE — 99214 PR OFFICE/OUTPT VISIT, EST, LEVL IV, 30-39 MIN: ICD-10-PCS | Mod: S$PBB,,, | Performed by: PSYCHIATRY & NEUROLOGY

## 2022-06-14 PROCEDURE — 3008F BODY MASS INDEX DOCD: CPT | Mod: CPTII,,, | Performed by: PSYCHIATRY & NEUROLOGY

## 2022-06-14 PROCEDURE — 3078F DIAST BP <80 MM HG: CPT | Mod: CPTII,,, | Performed by: PSYCHIATRY & NEUROLOGY

## 2022-06-14 PROCEDURE — 99214 OFFICE O/P EST MOD 30 MIN: CPT | Mod: S$PBB,,, | Performed by: PSYCHIATRY & NEUROLOGY

## 2022-06-14 PROCEDURE — 3078F PR MOST RECENT DIASTOLIC BLOOD PRESSURE < 80 MM HG: ICD-10-PCS | Mod: CPTII,,, | Performed by: PSYCHIATRY & NEUROLOGY

## 2022-06-14 PROCEDURE — 3074F PR MOST RECENT SYSTOLIC BLOOD PRESSURE < 130 MM HG: ICD-10-PCS | Mod: CPTII,,, | Performed by: PSYCHIATRY & NEUROLOGY

## 2022-06-14 PROCEDURE — 1159F PR MEDICATION LIST DOCUMENTED IN MEDICAL RECORD: ICD-10-PCS | Mod: CPTII,,, | Performed by: PSYCHIATRY & NEUROLOGY

## 2022-06-14 PROCEDURE — 99999 PR PBB SHADOW E&M-EST. PATIENT-LVL IV: CPT | Mod: PBBFAC,,, | Performed by: PSYCHIATRY & NEUROLOGY

## 2022-06-14 RX ORDER — MELATONIN 5 MG
5 CAPSULE ORAL NIGHTLY
Qty: 3 CAPSULE | Refills: 3 | Status: SHIPPED | OUTPATIENT
Start: 2022-06-14 | End: 2022-07-14

## 2022-06-14 NOTE — PROGRESS NOTES
Vascular Neurology  Clinic Visit    Reason For Visit (Chief Complaint): headaches    HPI: Ms. Ponce is a 42 y.o. woman, previously healthy, who developed R hemiparesis and speech difficulty on 6/21/19, found to have acute L-BG stroke.  [place] was [place] [facility] [place] for headaches and worsening R sided symptoms, and concern for vasculitis.      Briefly, Ms. Ponce represented to [place] in late July for persistent headaches and worsening of R sided weakness. MRI brain was negative for new strokes, but did show stenotic L-MCA that the OSH interpreted as vasculitic changes.  She was started on steroids and underwent LP which was bland (2->0 WBC, P31, Glu70).  She was [place] [facility] [place] [facility] [place], which showed tapering to near occlusion of LM1, with numerous lenticulostriate collateral vessels, in a MoyaMoya pattern.  No other vascular abnormalities to suggest vasculitis were seen.   DAPT was resumed and she was treated for nausea and headaches.  She was discharged home with outpatient therapy.    Interval History:  Taking tylenol about every other day for sharp pains in R side of head.  Described as brief stabbing like pain.  Taking gabapentin for leg pain and headaches.  No longer taking amitriptyline.  Reports drinking plenty of water.   Mood has been okay.  Slipped recently in bathtub - doesn't like to use her shower chair.  Had hysterectomy without incident.       Brain Imaging:  MRI Brain 7/27/19:  1. Intermediate restricted diffusion signal in the left basal ganglia with faint enhancement and low associated ADC signal is most consistent with a subacute infarct    DSA 8/2/19:  Abnormal left M1 segment which tapers to high-grade stenosis.  There are hypertrophied lenticulostriate moyamoya type collaterals from the left M1 segment. Both anterior cerebral arteries fill from the right, and there is leptomeningeal flow from left PENELOPE to left MCA territory.      Cardiac Evaluation:  TTE  7/29/19:  LVEF 55%  NL LV diastolic dysfunction  Neg bubble    Relevant Labwork:  Recent Labs   Lab 07/27/19  1226 07/28/19  0555 01/28/20  1118   Hemoglobin A1C 5.5  --   --    LDL Cholesterol  --    < > 45.0 L   HDL  --    < > 41   Triglycerides  --    < > 135   Cholesterol  --    < > 113 L    < > = values in this interval not displayed.         I independently viewed the above imaging studies and  I reviewed the reports of the above imaging.  I reviewed the above labwork.    Review of Systems  Msk: negative for muscle pain  Skin: negative for pruritis  Neuro: + HA  All others negative    Past Medical History  Past Medical History:   Diagnosis Date    Dysarthria     Hemiparesis affecting right side as late effect of cerebrovascular accident (CVA)     High cholesterol     Hyperreflexia of lower extremity     Moyamoya     Stroke 06/21/2019    Stroke 07/2019     Family History  No family history stroke or neurological disease.    Social History  Completed high school.  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  Non-smoker.    Medication List with Changes/Refills   New Medications    MELATONIN 5 MG CAP    Take 1 capsule (5 mg total) by mouth every evening.   Current Medications    ASPIRIN (ECOTRIN) 325 MG EC TABLET    Take 1 tablet (325 mg total) by mouth once daily.    ATORVASTATIN (LIPITOR) 80 MG TABLET    Take 1 tablet (80 mg total) by mouth once daily.    BACLOFEN (LIORESAL) 10 MG TABLET    Take 5 mg by mouth 2 (two) times daily as needed.    CLOPIDOGREL (PLAVIX) 75 MG TABLET    Take 75 mg by mouth once daily.    CYANOCOBALAMIN, VITAMIN B-12, 50 MCG TABLET    Take 50 mcg by mouth once daily.    FLUOXETINE 20 MG CAPSULE    Take 1 capsule (20 mg total) by mouth every evening.    FLUTICASONE PROPIONATE (FLONASE) 50 MCG/ACTUATION NASAL SPRAY    1 spray by Each Nostril route daily as needed.    MONTELUKAST (SINGULAIR) 10 MG TABLET    Take 10 mg by mouth every evening.    ONDANSETRON (ZOFRAN) 4 MG TABLET    Take  "1 tablet (4 mg total) by mouth every 8 (eight) hours as needed.    PANTOPRAZOLE (PROTONIX) 40 MG TABLET    Take 40 mg by mouth daily as needed.    POLYETHYLENE GLYCOL (GLYCOLAX) 17 GRAM PWPK    Take 17 g by mouth daily as needed (constipation).   Changed and/or Refilled Medications    Modified Medication Previous Medication    GABAPENTIN (NEURONTIN) 100 MG CAPSULE gabapentin (NEURONTIN) 100 MG capsule       Take 1 capsule (100 mg total) by mouth 2 (two) times daily. 1 in the am/ 2 tablets nightly    TAKE ONE CAPSULE BY MOUTH THREE TIMES DAILY   Discontinued Medications    AMITRIPTYLINE (ELAVIL) 25 MG TABLET    Take 1 tablet (25 mg total) by mouth every evening.    OXYCODONE-ACETAMINOPHEN (PERCOCET) 5-325 MG PER TABLET    Take 1 tablet by mouth every 4 (four) hours as needed for Pain.       EXAM  Vital Signs  Pulse: (!) 58  BP: 101/65  BP Location: Right arm  Patient Position: Sitting  Pain Score:   5  Pain Loc: Leg ((R))  Height and Weight  Height: 5' 2" (157.5 cm)  Weight: 73 kg (160 lb 15 oz)  BSA (Calculated - sq m): 1.79 sq meters  BMI (Calculated): 29.4  Weight in (lb) to have BMI = 25: 136.4]    General: well appearing without discomfort   Neck: full ROM  Eyes: conjunctiva clear, no icterus  CV: RRR, nL S1&S2  Resp: breathing comfortably, no wheezing  Skin: tanned skin, no bruises or rashes    Mental Status: Alert and oriented, normal attention, speech fluent and prosodic, naming and repetition intact, follows multistep embedded commands, no e/o neglect or extinction  Cranial Nerves: PERRL, EOMI, VFF, sensation intact, L facial?, mildly disarthric, SCM/trap 5/5  Motor: No orbiting or drift   Sensory: reduced sensation R arm/leg  Coordination: no ataxia on finger-to-nose  Gait & Stance: deferred    ___________________  ASSESSMENT & PLAN  Ms. Ponce is a 39 y.o. 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  Has " "been stable with the exception of chronic headaches, currently described as "ice pick" in quality.  Also due for repeat perfusion imaging.    - Start nightly melatonin for ice pick headaches.  - D/C amitriptyline.  - Continue daytime GBP for leg cramps  - Limit OTC pain relievers.  - Continue DAPT for secondary stroke prevention  - Will recheck lipid panel, likely decr atorvastatin  - MRI perfusion  - Stay well hydrated with water.  - RTC 6 mos.    Problem List Items Addressed This Visit        Unprioritized    History of ischemic left MCA stroke - Primary    Relevant Orders    Lipid Panel    MRI Brain (Tumor with Perfusion) W W/O Contrast (XPD)    CREATININE, SERUM    Hemiparesis of right dominant side as late effect of cerebral infarction    Chronic right-sided headaches          Caridad Benjamin MD  Vascular Neurology        "

## 2022-06-14 NOTE — PATIENT INSTRUCTIONS
"- Continue aspirin and plavix for stroke prevention.  - Continue atorvastatin 80 mg daily.  - Stop nighttime gabapentin.    - Start taking melatonin 5 mg nightly for "stabbing headaches"    Mediterranean Diet Recommendations    Eat primarily plant-based foods, such as fruits and vegetables, whole grains, legumes (beans) and nuts.  Limit refined carbohydrates (white pasta, bread, rice).  Replace butter with healthy fats such as olive oil.  Use herbs and spices instead of salt to flavor foods.  Limit red meat and processed meats to no more than a few times a month.  Avoid sugary sodas, bakery goods, and sweets.  Eat fish and poultry at least twice a week.  Get plenty of exercise (150 minutes per week).    Adopted from Chani beal al, NEJ, 2018.      "

## 2022-06-16 PROBLEM — R51.9 CHRONIC RIGHT-SIDED HEADACHES: Status: ACTIVE | Noted: 2022-06-16

## 2022-06-16 PROBLEM — G89.29 CHRONIC RIGHT-SIDED HEADACHES: Status: ACTIVE | Noted: 2022-06-16

## 2022-06-24 ENCOUNTER — HOSPITAL ENCOUNTER (OUTPATIENT)
Dept: RADIOLOGY | Facility: HOSPITAL | Age: 42
Discharge: HOME | End: 2022-06-24
Attending: PSYCHIATRY & NEUROLOGY
Payer: MEDICAID

## 2022-06-24 DIAGNOSIS — Z86.73 HISTORY OF ISCHEMIC LEFT MCA STROKE: ICD-10-CM

## 2022-06-24 PROCEDURE — 25500020 PHARM REV CODE 255: Mod: PO | Performed by: PSYCHIATRY & NEUROLOGY

## 2022-06-24 PROCEDURE — 70553 MRI BRAIN (TUMOR WITH PERFUSION) W W/O CONTRAST (XPD): ICD-10-PCS | Mod: 26,,, | Performed by: RADIOLOGY

## 2022-06-24 PROCEDURE — A9585 GADOBUTROL INJECTION: HCPCS | Mod: PO | Performed by: PSYCHIATRY & NEUROLOGY

## 2022-06-24 PROCEDURE — 70553 MRI BRAIN STEM W/O & W/DYE: CPT | Mod: 26,,, | Performed by: RADIOLOGY

## 2022-06-24 PROCEDURE — 70553 MRI BRAIN STEM W/O & W/DYE: CPT | Mod: TC,PO

## 2022-06-24 RX ORDER — GADOBUTROL 604.72 MG/ML
7 INJECTION INTRAVENOUS
Status: COMPLETED | OUTPATIENT
Start: 2022-06-24 | End: 2022-06-24

## 2022-06-24 RX ADMIN — GADOBUTROL 7 ML: 604.72 INJECTION INTRAVENOUS at 02:06

## 2022-07-19 RX ORDER — GABAPENTIN 100 MG/1
CAPSULE ORAL
Qty: 30 CAPSULE | Refills: 1 | Status: SHIPPED | OUTPATIENT
Start: 2022-07-19 | End: 2022-08-19

## 2023-03-22 ENCOUNTER — TELEPHONE (OUTPATIENT)
Dept: NEUROLOGY | Facility: CLINIC | Age: 43
End: 2023-03-22
Payer: MEDICAID

## 2023-03-22 NOTE — TELEPHONE ENCOUNTER
----- Message from Gema Dyer sent at 3/22/2023  1:37 PM CDT -----  Regarding: APPT  Contact: Pt [phone]  Patient called to schedule appt due to imbalanced more falls and tightness in head no janneth dates please call to discuss further

## 2023-03-23 NOTE — TELEPHONE ENCOUNTER
Called pt regarding appt request for f/u. After speaking w Dr. Benjamin, I called pt to schedule appt. Pt scheduled for 3/27 at 10:40am and pt confirmed date/time

## 2023-03-27 ENCOUNTER — OFFICE VISIT (OUTPATIENT)
Dept: NEUROLOGY | Facility: CLINIC | Age: 43
End: 2023-03-27
Payer: MEDICAID

## 2023-03-27 ENCOUNTER — LAB VISIT (OUTPATIENT)
Dept: LAB | Facility: HOSPITAL | Age: 43
End: 2023-03-27
Attending: PSYCHIATRY & NEUROLOGY
Payer: MEDICAID

## 2023-03-27 VITALS
BODY MASS INDEX: 31.16 KG/M2 | SYSTOLIC BLOOD PRESSURE: 100 MMHG | HEIGHT: 62 IN | HEART RATE: 61 BPM | DIASTOLIC BLOOD PRESSURE: 70 MMHG | WEIGHT: 169.31 LBS

## 2023-03-27 DIAGNOSIS — G47.00 INSOMNIA, UNSPECIFIED TYPE: ICD-10-CM

## 2023-03-27 DIAGNOSIS — Z86.73 HISTORY OF ISCHEMIC LEFT MCA STROKE: Primary | ICD-10-CM

## 2023-03-27 DIAGNOSIS — R29.818 OTHER SYMPTOMS AND SIGNS INVOLVING THE NERVOUS SYSTEM: ICD-10-CM

## 2023-03-27 DIAGNOSIS — Z86.73 HISTORY OF ISCHEMIC LEFT MCA STROKE: ICD-10-CM

## 2023-03-27 DIAGNOSIS — F32.9 REACTIVE DEPRESSION: ICD-10-CM

## 2023-03-27 LAB
CREAT SERPL-MCNC: 0.8 MG/DL (ref 0.5–1.4)
EST. GFR  (NO RACE VARIABLE): >60 ML/MIN/1.73 M^2

## 2023-03-27 PROCEDURE — 99999 PR PBB SHADOW E&M-EST. PATIENT-LVL V: ICD-10-PCS | Mod: PBBFAC,,, | Performed by: PSYCHIATRY & NEUROLOGY

## 2023-03-27 PROCEDURE — 99999 PR PBB SHADOW E&M-EST. PATIENT-LVL V: CPT | Mod: PBBFAC,,, | Performed by: PSYCHIATRY & NEUROLOGY

## 2023-03-27 PROCEDURE — 36415 COLL VENOUS BLD VENIPUNCTURE: CPT | Performed by: PSYCHIATRY & NEUROLOGY

## 2023-03-27 PROCEDURE — 99214 PR OFFICE/OUTPT VISIT, EST, LEVL IV, 30-39 MIN: ICD-10-PCS | Mod: S$PBB,,, | Performed by: PSYCHIATRY & NEUROLOGY

## 2023-03-27 PROCEDURE — 99215 OFFICE O/P EST HI 40 MIN: CPT | Mod: PBBFAC | Performed by: PSYCHIATRY & NEUROLOGY

## 2023-03-27 PROCEDURE — 99214 OFFICE O/P EST MOD 30 MIN: CPT | Mod: S$PBB,,, | Performed by: PSYCHIATRY & NEUROLOGY

## 2023-03-27 PROCEDURE — 82565 ASSAY OF CREATININE: CPT | Performed by: PSYCHIATRY & NEUROLOGY

## 2023-03-27 RX ORDER — MAGNESIUM 250 MG
250 TABLET ORAL ONCE
Status: ON HOLD | COMMUNITY
End: 2023-07-07 | Stop reason: HOSPADM

## 2023-03-27 RX ORDER — VITS A,C,E/LUTEIN/MINERALS 300MCG-200
400 TABLET ORAL DAILY
Qty: 30 TABLET | Refills: 3 | Status: SHIPPED | OUTPATIENT
Start: 2023-03-27 | End: 2023-06-01

## 2023-03-27 NOTE — PATIENT INSTRUCTIONS
- Take tylenol or gabapentin as needed for headache.  - Referral to Sleep medicine for sleep study  - Repeat MRI brain  - Drink plenty of water      Mediterranean Diet Recommendations    Eat primarily plant-based foods, such as fruits and vegetables, whole grains, legumes (beans) and nuts.  Limit refined carbohydrates (white pasta, bread, rice).  Replace butter with healthy fats such as olive oil.  Use herbs and spices instead of salt to flavor foods.  Limit red meat and processed meats to no more than a few times a month.  Avoid sugary sodas, bakery goods, and sweets.  Eat fish and poultry at least twice a week.  Get plenty of exercise (150 minutes per week).    Adopted from Chani beal al, NEJM, 2018.

## 2023-03-27 NOTE — PROGRESS NOTES
Vascular Neurology  Clinic Visit    Reason For Visit (Chief Complaint): headaches, Leslie Leslie f/u    HPI: Ms. Ponce is a 43 y.o. woman, previously healthy, who developed R hemiparesis and speech difficulty on 6/21/19, found to have acute L-BG stroke.  She was transferred from [place] to [facility] in early August for headaches and worsening R sided symptoms, and concern for vasculitis.      Briefly, Ms. Ponce represented to [place] in late July for persistent headaches and worsening of R sided weakness. MRI brain was negative for new strokes, but did show stenotic L-MCA that the OSH interpreted as vasculitic changes.  She was started on steroids and underwent LP which was bland (2->0 WBC, P31, Glu70).  She was transferred to [facility] for angio, which showed tapering to near occlusion of LM1, with numerous lenticulostriate collateral vessels, in a MoyaMoya pattern.  No other vascular abnormalities to suggest vasculitis were seen.   DAPT was resumed and she was treated for nausea and headaches.  She was discharged home with outpatient therapy.    Taking tylenol about every other day for sharp pains in R side of head.  Described as brief stabbing like pain.  Taking gabapentin for leg pain and headaches.  No longer taking amitriptyline.  Reports drinking plenty of water.   Mood has been okay.  Slipped recently in bathtub - doesn't like to use her shower chair.  Had hysterectomy without incident.     Interval History:  In a boot for sprained ankle.  Balance has been worse recently - frequent falls.  Difficulty keeping track of train of thought.  No recently infectious illnesses.  Still drinking plenty of water.  Reports HA every other day.  Taking GBP in the morning.  Also still on Prozac.  Difficulty staying asleep.  Wakes up a few hours after going to sleep.  Doesn't feel rested.  Doesn't know if she snores.  Doesn't doze off during the day.    Brain Imaging:  MRI Brain w/ Perfusion 6/24/22:  1. No evidence of an acute  intracranial abnormality or significant interval detrimental change as compared to the prior study.  2. Stable chronic left basal ganglia/corona radiata infarct.  No new infarct or hemorrhage.  3. MR perfusion again demonstrates findings suggestive of compensated left MCA stenosis, with increased time to peak and mean transit time and without significant alteration of cerebral blood flow or volume.      Cardiac Evaluation:  TTE 7/29/19:  LVEF 55%  NL LV diastolic dysfunction  Neg bubble    Relevant Labwork:            I independently viewed the above imaging studies and  I reviewed the reports of the above imaging.  I reviewed the above labwork.    Review of Systems  Msk: negative for muscle pain  Skin: negative for pruritis  Neuro: + HA, imbalance  All others negative    Past Medical History  Past Medical History:   Diagnosis Date    Dysarthria     Hemiparesis affecting right side as late effect of cerebrovascular accident (CVA)     High cholesterol     Hyperreflexia of lower extremity     Moyamoya     Stroke 06/21/2019    Stroke 07/2019     Family History  No family history stroke or neurological disease.    Social History  Completed high school.  Was working mowing lawns prior to stroke.  Non-smoker.    Medication List with Changes/Refills   New Medications    MAGNESIUM OXIDE 200 MG MAGNESIUM TAB    Take 400 mg by mouth once daily.   Current Medications    ASPIRIN (ECOTRIN) 325 MG EC TABLET    Take 1 tablet (325 mg total) by mouth once daily.    ATORVASTATIN (LIPITOR) 80 MG TABLET    Take 1 tablet (80 mg total) by mouth once daily.    BACLOFEN (LIORESAL) 10 MG TABLET    Take 5 mg by mouth 2 (two) times daily as needed.    CALCIUM CARBONATE (CALCIUM 300 ORAL)    Take 50 mg by mouth Daily.    CLOPIDOGREL (PLAVIX) 75 MG TABLET    Take 75 mg by mouth once daily.    CYANOCOBALAMIN, VITAMIN B-12, 50 MCG TABLET    Take 50 mcg by mouth once daily.    FLUOXETINE 20 MG CAPSULE    Take 1 capsule (20 mg total) by mouth every  "evening.    FLUTICASONE PROPIONATE (FLONASE) 50 MCG/ACTUATION NASAL SPRAY    1 spray by Each Nostril route daily as needed.    GABAPENTIN (NEURONTIN) 100 MG CAPSULE    TAKE ONE CAPSULE BY MOUTH THREE TIMES DAILY    MAGNESIUM 250 MG TAB    Take 250 mg by mouth once.    MONTELUKAST (SINGULAIR) 10 MG TABLET    Take 10 mg by mouth every evening.    MULTIVITAMIN TAB    Take 1 tablet by mouth once daily.    ONDANSETRON (ZOFRAN) 4 MG TABLET    Take 1 tablet (4 mg total) by mouth every 8 (eight) hours as needed.    PANTOPRAZOLE (PROTONIX) 40 MG TABLET    Take 40 mg by mouth daily as needed.    POLYETHYLENE GLYCOL (GLYCOLAX) 17 GRAM PWPK    Take 17 g by mouth daily as needed (constipation).       EXAM  Vital Signs  Pulse: 61  BP: 100/70  Pain Score:   6  Pain Loc: Head  Height and Weight  Height: 5' 2" (157.5 cm)  Weight: 76.8 kg (169 lb 5 oz)  BSA (Calculated - sq m): 1.83 sq meters  BMI (Calculated): 31  Weight in (lb) to have BMI = 25: 136.4]    General: well appearing without discomfort   Neck: full ROM  Eyes: conjunctiva clear, no icterus  CV: RRR, nL S1&S2  Resp: breathing comfortably, no wheezing  Ext: L leg in walking boot    Mental Status: Alert and oriented, normal attention, speech fluent and prosodic, naming and repetition intact, follows multistep embedded commands, no e/o neglect or extinction  Cranial Nerves: PERRL, EOMI, VFF, sensation reduced R face, L facial, mildly disarthric, SCM/trap 5/5  Motor: Orbits R arm  Giveway weakness L should abd - remainder 5/5 throughout  Sensory: reduced sensation R arm/leg  Coordination: no ataxia on finger-to-nose  Gait & Stance: deferred    ___________________  ASSESSMENT & PLAN  Ms. Ponce is a 39 y.o. 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   Currently experiencing more imbalance and  L facial droop looks more prominent on exam.  Will repeat imaging.    - MRI/MRA w/ " Perfusion  - Referral to Sleep Medicine for sleep study  - Limit OTC pain relievers.  - Daily magnesium for headache prevention  - Continue DAPT for secondary stroke prevention  - Referral to Psychology for stress management techniques and counseling  - Stay well hydrated with water.  - RTC 6 mos.    Problem List Items Addressed This Visit          Unprioritized    Reactive depression    Relevant Orders    Ambulatory consult to Psychology    History of ischemic left MCA stroke - Primary    Relevant Orders    MRI Brain (Tumor with Perfusion) W W/O Contrast (XPD)    CREATININE, SERUM (Completed)     Other Visit Diagnoses       Insomnia, unspecified type        Relevant Orders    Ambulatory referral/consult to Sleep Disorders    Other symptoms and signs involving the nervous system        Relevant Orders    MRA Brain              Caridad Benjamin MD  Vascular Neurology

## 2023-03-29 ENCOUNTER — TELEPHONE (OUTPATIENT)
Dept: NEUROLOGY | Facility: CLINIC | Age: 43
End: 2023-03-29
Payer: MEDICAID

## 2023-03-29 NOTE — TELEPHONE ENCOUNTER
"Returned Laurita's call. She reports patient was fine when she returned home after their appointment (2/27) with Dr. Benjamin. Today, Laurita sounded nervous over the phone and describes a sudden decline from patient's baseline. Yesterday (2/28), Laurita called patient who was very confused over the phone and  "stumbling over words"; patient was not sure what was "going on in her surroundings", so Laurita asked patient to text. Texts were missing small words and syllables. Patient felt very dizzy, tired, off balance, and brain fog.    This morning, Laurita reports patient was still "stumbling over words" and struggled to keep up with conversations. Laurita requested advice on what to do. Discussed Dr. Benjamin will be informed. Discussed signs and symptoms of a stroke. Discussed it is possible patient could be having/have had a stroke. Strongly advised Laurita bring patient to ED to be evaluated.   Laurita informed me the MRIs Dr. Benjamin ordered were pushed back to May.       "

## 2023-03-29 NOTE — TELEPHONE ENCOUNTER
----- Message from Fatemeh Cantu sent at 3/29/2023  9:53 AM CDT -----  Regarding: pt advice  Contact: Laurita @[phone]  Laurita pt  sister calling in regards to pt having dizziness and hasn't been able to walk around, slur speech. Pls discuss

## 2023-03-30 ENCOUNTER — PATIENT MESSAGE (OUTPATIENT)
Dept: NEUROLOGY | Facility: CLINIC | Age: 43
End: 2023-03-30
Payer: MEDICAID

## 2023-03-30 NOTE — TELEPHONE ENCOUNTER
Called pt and rescheduled MRI appts for 4/13 at 1:15pm. Pt confirmed date/time and asked that I send appt reminder via mail. I explained to pt that she need to arrive 30 min before and the whole procedure will take approximately an hour and a half with both MRIs. Pt verbalized understanding.

## 2023-04-13 ENCOUNTER — HOSPITAL ENCOUNTER (OUTPATIENT)
Dept: RADIOLOGY | Facility: HOSPITAL | Age: 43
Discharge: HOME | End: 2023-04-13
Attending: PSYCHIATRY & NEUROLOGY
Payer: MEDICAID

## 2023-04-13 DIAGNOSIS — R29.818 OTHER SYMPTOMS AND SIGNS INVOLVING THE NERVOUS SYSTEM: ICD-10-CM

## 2023-04-13 DIAGNOSIS — Z86.73 HISTORY OF ISCHEMIC LEFT MCA STROKE: ICD-10-CM

## 2023-04-13 PROCEDURE — A9585 GADOBUTROL INJECTION: HCPCS | Performed by: PSYCHIATRY & NEUROLOGY

## 2023-04-13 PROCEDURE — 25500020 PHARM REV CODE 255: Performed by: PSYCHIATRY & NEUROLOGY

## 2023-04-13 PROCEDURE — 70553 MRI BRAIN STEM W/O & W/DYE: CPT | Mod: TC

## 2023-04-13 PROCEDURE — 70544 MR ANGIOGRAPHY HEAD W/O DYE: CPT | Mod: 59,TC

## 2023-04-13 RX ORDER — GADOBUTROL 604.72 MG/ML
10 INJECTION INTRAVENOUS
Status: COMPLETED | OUTPATIENT
Start: 2023-04-13 | End: 2023-04-13

## 2023-04-13 RX ADMIN — GADOBUTROL 10 ML: 604.72 INJECTION INTRAVENOUS at 03:04

## 2023-06-01 RX ORDER — LANOLIN ALCOHOL/MO/W.PET/CERES
CREAM (GRAM) TOPICAL
Qty: 30 TABLET | Refills: 5 | Status: SHIPPED | OUTPATIENT
Start: 2023-06-01 | End: 2023-11-14

## 2023-06-07 ENCOUNTER — HOSPITAL ENCOUNTER (OUTPATIENT)
Dept: RADIOLOGY | Facility: HOSPITAL | Age: 43
Discharge: HOME | End: 2023-06-07
Attending: SPECIALIST
Payer: MEDICAID

## 2023-06-07 ENCOUNTER — OFFICE VISIT (OUTPATIENT)
Dept: OBSTETRICS AND GYNECOLOGY | Facility: CLINIC | Age: 43
End: 2023-06-07
Payer: MEDICAID

## 2023-06-07 VITALS
WEIGHT: 152.56 LBS | HEART RATE: 57 BPM | DIASTOLIC BLOOD PRESSURE: 70 MMHG | SYSTOLIC BLOOD PRESSURE: 109 MMHG | BODY MASS INDEX: 28.07 KG/M2 | HEIGHT: 62 IN | RESPIRATION RATE: 18 BRPM

## 2023-06-07 DIAGNOSIS — Z12.31 SCREENING MAMMOGRAM, ENCOUNTER FOR: Primary | ICD-10-CM

## 2023-06-07 DIAGNOSIS — Z12.31 SCREENING MAMMOGRAM, ENCOUNTER FOR: ICD-10-CM

## 2023-06-07 PROCEDURE — 99213 OFFICE O/P EST LOW 20 MIN: CPT | Mod: S$PBB,,, | Performed by: SPECIALIST

## 2023-06-07 PROCEDURE — 99213 PR OFFICE/OUTPT VISIT, EST, LEVL III, 20-29 MIN: ICD-10-PCS | Mod: S$PBB,,, | Performed by: SPECIALIST

## 2023-06-07 PROCEDURE — 99999 PR PBB SHADOW E&M-EST. PATIENT-LVL III: CPT | Mod: PBBFAC,,, | Performed by: SPECIALIST

## 2023-06-07 PROCEDURE — 3008F BODY MASS INDEX DOCD: CPT | Mod: CPTII,,, | Performed by: SPECIALIST

## 2023-06-07 PROCEDURE — 77063 BREAST TOMOSYNTHESIS BI: CPT | Mod: 26,,, | Performed by: RADIOLOGY

## 2023-06-07 PROCEDURE — 99999 PR PBB SHADOW E&M-EST. PATIENT-LVL III: ICD-10-PCS | Mod: PBBFAC,,, | Performed by: SPECIALIST

## 2023-06-07 PROCEDURE — 3078F DIAST BP <80 MM HG: CPT | Mod: CPTII,,, | Performed by: SPECIALIST

## 2023-06-07 PROCEDURE — 77063 MAMMO DIGITAL SCREENING BILAT WITH TOMO: ICD-10-PCS | Mod: 26,,, | Performed by: RADIOLOGY

## 2023-06-07 PROCEDURE — 77067 MAMMO DIGITAL SCREENING BILAT WITH TOMO: ICD-10-PCS | Mod: 26,,, | Performed by: RADIOLOGY

## 2023-06-07 PROCEDURE — 3074F PR MOST RECENT SYSTOLIC BLOOD PRESSURE < 130 MM HG: ICD-10-PCS | Mod: CPTII,,, | Performed by: SPECIALIST

## 2023-06-07 PROCEDURE — 3008F PR BODY MASS INDEX (BMI) DOCUMENTED: ICD-10-PCS | Mod: CPTII,,, | Performed by: SPECIALIST

## 2023-06-07 PROCEDURE — 77067 SCR MAMMO BI INCL CAD: CPT | Mod: 26,,, | Performed by: RADIOLOGY

## 2023-06-07 PROCEDURE — 77067 SCR MAMMO BI INCL CAD: CPT | Mod: TC,PN

## 2023-06-07 PROCEDURE — 3074F SYST BP LT 130 MM HG: CPT | Mod: CPTII,,, | Performed by: SPECIALIST

## 2023-06-07 PROCEDURE — 3078F PR MOST RECENT DIASTOLIC BLOOD PRESSURE < 80 MM HG: ICD-10-PCS | Mod: CPTII,,, | Performed by: SPECIALIST

## 2023-06-07 PROCEDURE — 99213 OFFICE O/P EST LOW 20 MIN: CPT | Mod: PBBFAC,PN | Performed by: SPECIALIST

## 2023-06-07 NOTE — PROGRESS NOTES
42 yo WF presents for annual gyn eval and screening mammogram No overt gyn complaints  Past Medical History:   Diagnosis Date    Dysarthria     Hemiparesis affecting right side as late effect of cerebrovascular accident (CVA)     High cholesterol     Hyperreflexia of lower extremity     Moyamoya     Stroke 06/21/2019    Stroke 07/2019       Past Surgical History:   Procedure Laterality Date    CEREBRAL ANGIOGRAM N/A 10/29/2019    Procedure: ANGIOGRAM-CEREBRAL;  Surgeon: [facility] Provider;  Location: [facility] OR 2ND FLR;  Service: Radiology;  Laterality: N/A;  Rm 190/[place]    CEREBRAL ANGIOGRAM N/A 8/18/2020    Procedure: ANGIOGRAM-CEREBRAL;  Surgeon: [facility] Diagnostic Provider;  Location: [facility] OR 2ND FLR;  Service: Radiology;  Laterality: N/A;  Rm190/[place]    HYSTERECTOMY      LAPAROSCOPIC SALPINGECTOMY Bilateral 11/8/2021    Procedure: SALPINGECTOMY, LAPAROSCOPIC;  Surgeon: Koko Calle MD;  Location: [facility] OR;  Service: OB/GYN;  Laterality: Bilateral;    LAPAROSCOPIC TOTAL HYSTERECTOMY N/A 11/8/2021    Procedure: HYSTERECTOMY, TOTAL, LAPAROSCOPIC;  Surgeon: Koko Calle MD;  Location: [facility] OR;  Service: OB/GYN;  Laterality: N/A;    TUBAL LIGATION  2005       Family History   Problem Relation Age of Onset    Hyperlipidemia Mother     Stroke Father     Diabetes Father     Heart disease Father     Hyperlipidemia Sister     Hypertension Sister     Cancer Brother         throat cancer    Asthma Brother     Breast cancer Neg Hx        Social History     Socioeconomic History    Marital status: Legally    Tobacco Use    Smoking status: Never    Smokeless tobacco: Never   Substance and Sexual Activity    Alcohol use: Never    Drug use: Never    Sexual activity: Not Currently     Birth control/protection: See Surgical Hx       Current Outpatient Medications   Medication Sig Dispense Refill    aspirin (ECOTRIN) 325 MG EC tablet Take 1 tablet (325 mg total) by mouth once daily.  0    atorvastatin  (LIPITOR) 80 MG tablet Take 1 tablet (80 mg total) by mouth once daily. 90 tablet 3    baclofen (LIORESAL) 10 MG tablet Take 5 mg by mouth 2 (two) times daily as needed.  1    calcium carbonate (CALCIUM 300 ORAL) Take 50 mg by mouth Daily.      clopidogrel (PLAVIX) 75 mg tablet Take 75 mg by mouth once daily.      cyanocobalamin, vitamin B-12, 50 mcg tablet Take 50 mcg by mouth once daily.      FLUoxetine 20 MG capsule Take 1 capsule (20 mg total) by mouth every evening. 30 capsule 11    fluticasone propionate (FLONASE) 50 mcg/actuation nasal spray 1 spray by Each Nostril route daily as needed.      gabapentin (NEURONTIN) 100 MG capsule TAKE ONE CAPSULE BY MOUTH THREE TIMES DAILY 30 capsule 1    magnesium 250 mg Tab Take 250 mg by mouth once.      magnesium oxide (MAG-OX) 400 mg (241.3 mg magnesium) tablet TAKE ONE TABLET BY MOUTH ONCE DAILY 30 tablet 5    montelukast (SINGULAIR) 10 mg tablet Take 10 mg by mouth every evening.      multivitamin Tab Take 1 tablet by mouth once daily.      ondansetron (ZOFRAN) 4 MG tablet Take 1 tablet (4 mg total) by mouth every 8 (eight) hours as needed. 90 tablet 0    pantoprazole (PROTONIX) 40 MG tablet Take 40 mg by mouth daily as needed.  2    polyethylene glycol (GLYCOLAX) 17 gram PwPk Take 17 g by mouth daily as needed (constipation). (Patient not taking: Reported on 6/14/2022) 30 packet 3     No current facility-administered medications for this visit.       Review of patient's allergies indicates:  No Known Allergies    Review of System:   General: no chills, fever, night sweats, weight gain or weight loss  Psychological: no depression or suicidal ideation  Breasts: no new or changing breast lumps, nipple discharge or masses.  Respiratory: no cough, shortness of breath, or wheezing  Cardiovascular: no chest pain or dyspnea on exertion  Gastrointestinal: no abdominal pain, change in bowel habits, or black or bloody stools  Genito-Urinary: no incontinence, urinary  frequency/urgency or vulvar/vaginal symptoms, pelvic pain or abnormal vaginal bleeding.  Musculoskeletal: no gait disturbance or muscular weakness     PE:   APPEARANCE: Well nourished, well developed, in no acute distress.  NECK: Neck symmetric without masses or thyromegaly.  NODES: No inguinal lymph node enlargement.  ABDOMEN: Soft. No tenderness or masses. No hepatosplenomegaly. No hernias.  BREASTS: Symmetrical, no skin changes or visible lesions. No palpable masses, nipple discharge or adenopathy bilaterally.  PELVIC: Normal external female genitalia without lesions. Normal hair distribution. Adequate perineal body, normal urethral meatus. Vagina moist and well rugated without lesions or discharge. No significant cystocele or rectocele. Uterus and cervix surgically absent. Bimanual exam revealed no masses, tenderness or abnormality.    NOTE  NURSING PERSONAL PRESENT FOR ENTIRE PHYSICAL EXAM     Plan  BSE monthly  Screening mammogram today and repeat yearly  Daily calcium and weight bearing exercise  RTO 2 years/prn    I spent a total of 30 minutes on the day of the visit. This includes face to face time and non-face to face time preparing to see the patient (eg, review of tests), obtaining and/or reviewing separately obtained history, documenting clinical information in the electronic or other health record, independently interpreting results and communicating results to the patient/family/caregiver, or care coordinator.

## 2023-06-15 ENCOUNTER — TELEPHONE (OUTPATIENT)
Dept: RADIOLOGY | Facility: HOSPITAL | Age: 43
End: 2023-06-15
Payer: MEDICAID

## 2023-06-16 ENCOUNTER — HOSPITAL ENCOUNTER (OUTPATIENT)
Dept: RADIOLOGY | Facility: HOSPITAL | Age: 43
Discharge: HOME | End: 2023-06-16
Attending: SPECIALIST
Payer: MEDICAID

## 2023-06-16 DIAGNOSIS — R92.8 ABNORMAL MAMMOGRAM OF LEFT BREAST: ICD-10-CM

## 2023-06-16 PROCEDURE — 76642 US BREAST LEFT LIMITED: ICD-10-PCS | Mod: 26,LT,, | Performed by: RADIOLOGY

## 2023-06-16 PROCEDURE — 77061 BREAST TOMOSYNTHESIS UNI: CPT | Mod: TC,PO,LT

## 2023-06-16 PROCEDURE — 76642 ULTRASOUND BREAST LIMITED: CPT | Mod: 26,LT,, | Performed by: RADIOLOGY

## 2023-06-16 PROCEDURE — 77061 MAMMO DIGITAL DIAGNOSTIC LEFT WITH TOMO: ICD-10-PCS | Mod: 26,LT,, | Performed by: RADIOLOGY

## 2023-06-16 PROCEDURE — 77061 BREAST TOMOSYNTHESIS UNI: CPT | Mod: 26,LT,, | Performed by: RADIOLOGY

## 2023-06-16 PROCEDURE — 77065 DX MAMMO INCL CAD UNI: CPT | Mod: 26,LT,, | Performed by: RADIOLOGY

## 2023-06-16 PROCEDURE — 77065 MAMMO DIGITAL DIAGNOSTIC LEFT WITH TOMO: ICD-10-PCS | Mod: 26,LT,, | Performed by: RADIOLOGY

## 2023-06-16 PROCEDURE — 76642 ULTRASOUND BREAST LIMITED: CPT | Mod: TC,PO,LT

## 2023-06-29 ENCOUNTER — PATIENT MESSAGE (OUTPATIENT)
Dept: NEUROLOGY | Facility: CLINIC | Age: 43
End: 2023-06-29
Payer: MEDICAID

## 2023-06-29 ENCOUNTER — TELEPHONE (OUTPATIENT)
Dept: NEUROLOGY | Facility: CLINIC | Age: 43
End: 2023-06-29
Payer: MEDICAID

## 2023-06-29 NOTE — TELEPHONE ENCOUNTER
"----- Message from Jennifer Rayray sent at 6/29/2023 10:24 AM CDT -----  Regarding: Appointment  "Type:  Patient Call Back    Who Called:pradeep (Sister)    What is the reqeust in detail:Requesting call back pt was seen in ER on 6/28/2023 and her sister says she can't remember anything and has been having headaches and weakness in right leg. Please advise    Can the clinic reply by [facility]?no    Best Call Back Number:[phone]      Additional Information:            "

## 2023-07-01 ENCOUNTER — HOSPITAL ENCOUNTER (INPATIENT)
Facility: HOSPITAL | Age: 43
LOS: 7 days | Discharge: HOME | DRG: 092 | End: 2023-07-08
Attending: INTERNAL MEDICINE | Admitting: INTERNAL MEDICINE
Payer: MEDICAID

## 2023-07-01 DIAGNOSIS — R53.81 PHYSICAL DECONDITIONING: ICD-10-CM

## 2023-07-01 DIAGNOSIS — I67.5 MOYA MOYA DISEASE: Primary | ICD-10-CM

## 2023-07-01 DIAGNOSIS — R07.9 CHEST PAIN: ICD-10-CM

## 2023-07-01 DIAGNOSIS — R29.2 HYPERREFLEXIA OF LOWER EXTREMITY: ICD-10-CM

## 2023-07-01 DIAGNOSIS — G93.40 ACUTE ENCEPHALOPATHY: ICD-10-CM

## 2023-07-01 DIAGNOSIS — R53.1 WEAKNESS: ICD-10-CM

## 2023-07-01 PROCEDURE — 11000001 HC ACUTE MED/SURG PRIVATE ROOM

## 2023-07-01 PROCEDURE — 99223 1ST HOSP IP/OBS HIGH 75: CPT | Mod: ,,, | Performed by: PHYSICIAN ASSISTANT

## 2023-07-01 PROCEDURE — 25000003 PHARM REV CODE 250: Performed by: PHYSICIAN ASSISTANT

## 2023-07-01 PROCEDURE — G0378 HOSPITAL OBSERVATION PER HR: HCPCS

## 2023-07-01 PROCEDURE — 99223 PR INITIAL HOSPITAL CARE,LEVL III: ICD-10-PCS | Mod: ,,, | Performed by: PHYSICIAN ASSISTANT

## 2023-07-01 PROCEDURE — G0379 DIRECT REFER HOSPITAL OBSERV: HCPCS

## 2023-07-01 RX ORDER — IBUPROFEN 200 MG
24 TABLET ORAL
Status: CANCELLED | OUTPATIENT
Start: 2023-07-01

## 2023-07-01 RX ORDER — TALC
6 POWDER (GRAM) TOPICAL NIGHTLY PRN
Status: CANCELLED | OUTPATIENT
Start: 2023-07-01

## 2023-07-01 RX ORDER — ONDANSETRON 8 MG/1
8 TABLET, ORALLY DISINTEGRATING ORAL EVERY 8 HOURS PRN
Status: CANCELLED | OUTPATIENT
Start: 2023-07-01

## 2023-07-01 RX ORDER — GLUCAGON 1 MG
1 KIT INJECTION
Status: DISCONTINUED | OUTPATIENT
Start: 2023-07-01 | End: 2023-07-08 | Stop reason: HOSPADM

## 2023-07-01 RX ORDER — POLYETHYLENE GLYCOL 3350 17 G/17G
17 POWDER, FOR SOLUTION ORAL DAILY PRN
Status: CANCELLED | OUTPATIENT
Start: 2023-07-01

## 2023-07-01 RX ORDER — PROMETHAZINE HYDROCHLORIDE 25 MG/1
25 TABLET ORAL EVERY 6 HOURS PRN
Status: CANCELLED | OUTPATIENT
Start: 2023-07-01

## 2023-07-01 RX ORDER — IBUPROFEN 200 MG
24 TABLET ORAL
Status: DISCONTINUED | OUTPATIENT
Start: 2023-07-01 | End: 2023-07-08 | Stop reason: HOSPADM

## 2023-07-01 RX ORDER — IBUPROFEN 200 MG
16 TABLET ORAL
Status: CANCELLED | OUTPATIENT
Start: 2023-07-01

## 2023-07-01 RX ORDER — BISACODYL 10 MG
10 SUPPOSITORY, RECTAL RECTAL DAILY PRN
Status: DISCONTINUED | OUTPATIENT
Start: 2023-07-01 | End: 2023-07-08 | Stop reason: HOSPADM

## 2023-07-01 RX ORDER — IBUPROFEN 200 MG
16 TABLET ORAL
Status: DISCONTINUED | OUTPATIENT
Start: 2023-07-01 | End: 2023-07-08 | Stop reason: HOSPADM

## 2023-07-01 RX ORDER — POLYETHYLENE GLYCOL 3350 17 G/17G
17 POWDER, FOR SOLUTION ORAL DAILY PRN
Status: DISCONTINUED | OUTPATIENT
Start: 2023-07-01 | End: 2023-07-08 | Stop reason: HOSPADM

## 2023-07-01 RX ORDER — GLUCAGON 1 MG
1 KIT INJECTION
Status: CANCELLED | OUTPATIENT
Start: 2023-07-01

## 2023-07-01 RX ORDER — ACETAMINOPHEN 325 MG/1
650 TABLET ORAL EVERY 4 HOURS PRN
Status: CANCELLED | OUTPATIENT
Start: 2023-07-01

## 2023-07-01 RX ORDER — ACETAMINOPHEN 325 MG/1
650 TABLET ORAL EVERY 4 HOURS PRN
Status: DISCONTINUED | OUTPATIENT
Start: 2023-07-01 | End: 2023-07-08 | Stop reason: HOSPADM

## 2023-07-01 RX ORDER — PROMETHAZINE HYDROCHLORIDE 25 MG/1
25 TABLET ORAL EVERY 6 HOURS PRN
Status: DISCONTINUED | OUTPATIENT
Start: 2023-07-01 | End: 2023-07-08 | Stop reason: HOSPADM

## 2023-07-01 RX ORDER — BISACODYL 10 MG
10 SUPPOSITORY, RECTAL RECTAL DAILY PRN
Status: CANCELLED | OUTPATIENT
Start: 2023-07-01

## 2023-07-01 RX ORDER — ONDANSETRON 8 MG/1
8 TABLET, ORALLY DISINTEGRATING ORAL EVERY 8 HOURS PRN
Status: DISCONTINUED | OUTPATIENT
Start: 2023-07-01 | End: 2023-07-08 | Stop reason: HOSPADM

## 2023-07-01 RX ORDER — TALC
6 POWDER (GRAM) TOPICAL NIGHTLY PRN
Status: DISCONTINUED | OUTPATIENT
Start: 2023-07-01 | End: 2023-07-08 | Stop reason: HOSPADM

## 2023-07-01 RX ADMIN — Medication 6 MG: at 11:07

## 2023-07-01 NOTE — Clinical Note
A pre-sedation assessment was completed by the physician immediately prior to sedation start. 
A pre-sedation assessment was completed by the physician immediately prior to sedation start. 
Anesthesia Type: RN IV Sedation
none

## 2023-07-01 NOTE — PROVIDER TRANSFER
(Physician in Lead of Transfers)  Outside Transfer Acceptance Note / Regional Referral Center    Upon patient arrival to floor, please send SecureChat to [facility] or call extension [phone] (if no answer, do NOT leave a callback number after the beep, rather please send a SecureChat to [facility]), for Hospital Medicine admit team assignment and for additional admit orders for the patient.  Do not page the attending physician associated with the patient on arrival (this physician may not be on duty at the time of arrival).  Rather, always send a SecureChat to [facility] or call [phone] to reach the triage physician for orders and team assignment.    Referring facility: [facility]   Referring provider: SUGEY BARR  Accepting facility: [facility]  Accepting provider: SAMANTHA MALIK  Reason for transfer: Higher Level of Care   Transfer diagnosis: Encephalopathy  Transfer specialty requested: Neurology  Transfer specialty notified: Yes  Transfer level: NUMBER 1-5: 2  Bed type requested: stepdown  Isolation status: No active isolations   Admission class or status: Obs      Narrative     43-year-old female with history of left basal ganglia stroke 2019, right-sided paresis, bergman bergman disease, hyperlipidemia, chronic right-sided headaches, and unsteady gait presented to [facility] emergency department with confusion and facial fasciculations on July 1.    Previously seen in Neurology Clinic at [facility] on March 27 noting that her balance had been worse with frequent falls.  She had difficulty with her train of thought.  Headaches persisted.  She is maintained on dual antiplatelet therapy.  She had MRI and MRA of the brain on April 13 that showed no acute intracranial pathology, stable appearance of the brain with chronic infarct in the deep white matter basal ganglia on the left, and stable asymmetric increased time to peak and mean transit times within  the deep white matter of the left frontal parietal region.      She subsequently presented to [facility] Emergency Department on June 28 with increased difficulty with walking and dragging her right leg, slurred speech, and mild headache.  Noncontrast MRI of the brain on June 28 showed subcentimeter abnormal signal focus in the bilateral supratentorial white matter which was nonspecific.  Scattered chronic sinus disease was also noted.  She was subsequently discharged home.    In the emergency department at [facility] on July 1, she reported 6 days of intermittent confusion and memory difficulties.  Around June 29 she also noted hearing difficulties and ringing in her years.  Much of the history was obtained from family because the patient has difficulty answering questions fluidly.  No significant headache or vision change noted, and she has been on DAPT.  On exam she had slight decreased strength in the right leg.  She had intermittent rhythmic contraction of the chin and intermittent fasciculations of the left lower eyelid along with nystagmus.  During these episodes she was awake and interactive.  She had no visual field deficit.  She had stable chronic dysarthria.  ED spoke with Neurology at [facility].  Will plan transfer to Hospital Medicine at [facility] in step-down status for neurology evaluation 1 possible continuous EEG.    Sedimentation rate 1, CRP less than 0.2, white blood cells 8.3, hemoglobin 14.5, hematocrit 43.5, platelets 250, TSH 1.161, sodium 141, potassium 3.7, chloride 106, CO2 24, BUN 10, creatinine 0.82, glucose 110, AST 11, ALT 18, urine drug screen negative, urinalysis negative    CT head had no acute intracranial abnormality.    Objective     Vitals: Temperature 98°, pulse 66, respirations 18, blood pressure 127/83, oxygen saturation 99%    Instructions    Admit to Hospital Medicine  Consult Neurology      KAHLIL Clancy MD  Hospital Medicine Staff  Cell:  [phone]

## 2023-07-02 PROBLEM — R25.2 SPASTICITY: Chronic | Status: ACTIVE | Noted: 2019-07-27

## 2023-07-02 PROBLEM — G89.29 CHRONIC RIGHT-SIDED HEADACHES: Chronic | Status: ACTIVE | Noted: 2022-06-16

## 2023-07-02 PROBLEM — R93.89 THICKENED ENDOMETRIUM: Status: RESOLVED | Noted: 2021-11-08 | Resolved: 2023-07-02

## 2023-07-02 PROBLEM — Z86.73 HISTORY OF ISCHEMIC LEFT MCA STROKE: Chronic | Status: ACTIVE | Noted: 2019-09-04

## 2023-07-02 PROBLEM — R51.9 HEADACHE: Status: RESOLVED | Noted: 2019-07-28 | Resolved: 2023-07-02

## 2023-07-02 PROBLEM — I69.351 HEMIPARESIS OF RIGHT DOMINANT SIDE AS LATE EFFECT OF CEREBRAL INFARCTION: Chronic | Status: ACTIVE | Noted: 2019-09-04

## 2023-07-02 PROBLEM — I67.5 MOYA MOYA DISEASE: Chronic | Status: ACTIVE | Noted: 2019-09-04

## 2023-07-02 PROBLEM — R51.9 CHRONIC RIGHT-SIDED HEADACHES: Chronic | Status: ACTIVE | Noted: 2022-06-16

## 2023-07-02 LAB
ALBUMIN SERPL BCP-MCNC: 3.8 G/DL (ref 3.5–5.2)
ALP SERPL-CCNC: 69 U/L (ref 55–135)
ALT SERPL W/O P-5'-P-CCNC: 16 U/L (ref 10–44)
AMMONIA PLAS-SCNC: 34 UMOL/L (ref 10–50)
ANION GAP SERPL CALC-SCNC: 10 MMOL/L (ref 8–16)
AST SERPL-CCNC: 14 U/L (ref 10–40)
BASOPHILS # BLD AUTO: 0.07 K/UL (ref 0–0.2)
BASOPHILS NFR BLD: 0.8 % (ref 0–1.9)
BILIRUB SERPL-MCNC: 0.6 MG/DL (ref 0.1–1)
BUN SERPL-MCNC: 11 MG/DL (ref 6–20)
CALCIUM SERPL-MCNC: 9.3 MG/DL (ref 8.7–10.5)
CHLORIDE SERPL-SCNC: 107 MMOL/L (ref 95–110)
CO2 SERPL-SCNC: 22 MMOL/L (ref 23–29)
CREAT SERPL-MCNC: 0.8 MG/DL (ref 0.5–1.4)
DIFFERENTIAL METHOD: NORMAL
EOSINOPHIL # BLD AUTO: 0.1 K/UL (ref 0–0.5)
EOSINOPHIL NFR BLD: 0.9 % (ref 0–8)
ERYTHROCYTE [DISTWIDTH] IN BLOOD BY AUTOMATED COUNT: 12.6 % (ref 11.5–14.5)
EST. GFR  (NO RACE VARIABLE): >60 ML/MIN/1.73 M^2
ESTIMATED AVG GLUCOSE: 105 MG/DL (ref 68–131)
FOLATE SERPL-MCNC: 18.3 NG/ML (ref 4–24)
GLUCOSE SERPL-MCNC: 101 MG/DL (ref 70–110)
HBA1C MFR BLD: 5.3 % (ref 4–5.6)
HCT VFR BLD AUTO: 42.9 % (ref 37–48.5)
HGB BLD-MCNC: 14.3 G/DL (ref 12–16)
IMM GRANULOCYTES # BLD AUTO: 0.01 K/UL (ref 0–0.04)
IMM GRANULOCYTES NFR BLD AUTO: 0.1 % (ref 0–0.5)
LYMPHOCYTES # BLD AUTO: 2.4 K/UL (ref 1–4.8)
LYMPHOCYTES NFR BLD: 27.8 % (ref 18–48)
MAGNESIUM SERPL-MCNC: 1.9 MG/DL (ref 1.6–2.6)
MCH RBC QN AUTO: 29.9 PG (ref 27–31)
MCHC RBC AUTO-ENTMCNC: 33.3 G/DL (ref 32–36)
MCV RBC AUTO: 90 FL (ref 82–98)
MONOCYTES # BLD AUTO: 0.6 K/UL (ref 0.3–1)
MONOCYTES NFR BLD: 7 % (ref 4–15)
NEUTROPHILS # BLD AUTO: 5.5 K/UL (ref 1.8–7.7)
NEUTROPHILS NFR BLD: 63.4 % (ref 38–73)
NRBC BLD-RTO: 0 /100 WBC
PHOSPHATE SERPL-MCNC: 3.9 MG/DL (ref 2.7–4.5)
PLATELET # BLD AUTO: 263 K/UL (ref 150–450)
PMV BLD AUTO: 10.8 FL (ref 9.2–12.9)
POTASSIUM SERPL-SCNC: 4 MMOL/L (ref 3.5–5.1)
PROT SERPL-MCNC: 7.1 G/DL (ref 6–8.4)
RBC # BLD AUTO: 4.79 M/UL (ref 4–5.4)
SODIUM SERPL-SCNC: 139 MMOL/L (ref 136–145)
VIT B12 SERPL-MCNC: >2000 PG/ML (ref 210–950)
WBC # BLD AUTO: 8.73 K/UL (ref 3.9–12.7)

## 2023-07-02 PROCEDURE — 83735 ASSAY OF MAGNESIUM: CPT | Performed by: PHYSICIAN ASSISTANT

## 2023-07-02 PROCEDURE — 25000003 PHARM REV CODE 250: Performed by: PHYSICIAN ASSISTANT

## 2023-07-02 PROCEDURE — 84425 ASSAY OF VITAMIN B-1: CPT | Performed by: PHYSICIAN ASSISTANT

## 2023-07-02 PROCEDURE — 99233 SBSQ HOSP IP/OBS HIGH 50: CPT | Mod: ,,, | Performed by: HOSPITALIST

## 2023-07-02 PROCEDURE — 99233 PR SUBSEQUENT HOSPITAL CARE,LEVL III: ICD-10-PCS | Mod: ,,, | Performed by: HOSPITALIST

## 2023-07-02 PROCEDURE — 95714 VEEG EA 12-26 HR UNMNTR: CPT

## 2023-07-02 PROCEDURE — G0378 HOSPITAL OBSERVATION PER HR: HCPCS

## 2023-07-02 PROCEDURE — 86592 SYPHILIS TEST NON-TREP QUAL: CPT | Performed by: PHYSICIAN ASSISTANT

## 2023-07-02 PROCEDURE — 85025 COMPLETE CBC W/AUTO DIFF WBC: CPT | Performed by: PHYSICIAN ASSISTANT

## 2023-07-02 PROCEDURE — 83036 HEMOGLOBIN GLYCOSYLATED A1C: CPT | Performed by: PHYSICIAN ASSISTANT

## 2023-07-02 PROCEDURE — 95720 PR EEG, W/VIDEO, CONT RECORD, I&R, >12<26 HRS: ICD-10-PCS | Mod: ,,, | Performed by: STUDENT IN AN ORGANIZED HEALTH CARE EDUCATION/TRAINING PROGRAM

## 2023-07-02 PROCEDURE — 82140 ASSAY OF AMMONIA: CPT | Performed by: PHYSICIAN ASSISTANT

## 2023-07-02 PROCEDURE — 82746 ASSAY OF FOLIC ACID SERUM: CPT | Performed by: PHYSICIAN ASSISTANT

## 2023-07-02 PROCEDURE — 95700 EEG CONT REC W/VID EEG TECH: CPT

## 2023-07-02 PROCEDURE — 82607 VITAMIN B-12: CPT | Performed by: PHYSICIAN ASSISTANT

## 2023-07-02 PROCEDURE — 11000001 HC ACUTE MED/SURG PRIVATE ROOM

## 2023-07-02 PROCEDURE — 99233 SBSQ HOSP IP/OBS HIGH 50: CPT | Mod: ,,, | Performed by: PSYCHIATRY & NEUROLOGY

## 2023-07-02 PROCEDURE — 80053 COMPREHEN METABOLIC PANEL: CPT | Performed by: PHYSICIAN ASSISTANT

## 2023-07-02 PROCEDURE — 84100 ASSAY OF PHOSPHORUS: CPT | Performed by: PHYSICIAN ASSISTANT

## 2023-07-02 PROCEDURE — 95720 EEG PHY/QHP EA INCR W/VEEG: CPT | Mod: ,,, | Performed by: STUDENT IN AN ORGANIZED HEALTH CARE EDUCATION/TRAINING PROGRAM

## 2023-07-02 PROCEDURE — 99233 PR SUBSEQUENT HOSPITAL CARE,LEVL III: ICD-10-PCS | Mod: ,,, | Performed by: PSYCHIATRY & NEUROLOGY

## 2023-07-02 RX ORDER — MONTELUKAST SODIUM 10 MG/1
10 TABLET ORAL NIGHTLY
Status: DISCONTINUED | OUTPATIENT
Start: 2023-07-02 | End: 2023-07-08 | Stop reason: HOSPADM

## 2023-07-02 RX ORDER — ASPIRIN 325 MG
325 TABLET, DELAYED RELEASE (ENTERIC COATED) ORAL DAILY
Status: DISCONTINUED | OUTPATIENT
Start: 2023-07-02 | End: 2023-07-08 | Stop reason: HOSPADM

## 2023-07-02 RX ORDER — BACLOFEN 10 MG/1
10 TABLET ORAL 2 TIMES DAILY
Status: DISCONTINUED | OUTPATIENT
Start: 2023-07-02 | End: 2023-07-08 | Stop reason: HOSPADM

## 2023-07-02 RX ORDER — FLUOXETINE HYDROCHLORIDE 20 MG/1
20 CAPSULE ORAL NIGHTLY
Status: DISCONTINUED | OUTPATIENT
Start: 2023-07-02 | End: 2023-07-08 | Stop reason: HOSPADM

## 2023-07-02 RX ORDER — CLOPIDOGREL BISULFATE 75 MG/1
75 TABLET ORAL DAILY
Status: DISCONTINUED | OUTPATIENT
Start: 2023-07-02 | End: 2023-07-08 | Stop reason: HOSPADM

## 2023-07-02 RX ORDER — GABAPENTIN 100 MG/1
100 CAPSULE ORAL DAILY
Status: DISCONTINUED | OUTPATIENT
Start: 2023-07-02 | End: 2023-07-08 | Stop reason: HOSPADM

## 2023-07-02 RX ORDER — ATORVASTATIN CALCIUM 40 MG/1
80 TABLET, FILM COATED ORAL DAILY
Status: DISCONTINUED | OUTPATIENT
Start: 2023-07-02 | End: 2023-07-08 | Stop reason: HOSPADM

## 2023-07-02 RX ADMIN — GABAPENTIN 100 MG: 100 CAPSULE ORAL at 05:07

## 2023-07-02 RX ADMIN — MONTELUKAST 10 MG: 10 TABLET, FILM COATED ORAL at 01:07

## 2023-07-02 RX ADMIN — BACLOFEN 10 MG: 10 TABLET ORAL at 08:07

## 2023-07-02 RX ADMIN — BACLOFEN 10 MG: 10 TABLET ORAL at 09:07

## 2023-07-02 RX ADMIN — ASPIRIN 325 MG: 325 TABLET, COATED ORAL at 08:07

## 2023-07-02 RX ADMIN — MONTELUKAST 10 MG: 10 TABLET, FILM COATED ORAL at 09:07

## 2023-07-02 RX ADMIN — CLOPIDOGREL BISULFATE 75 MG: 75 TABLET ORAL at 08:07

## 2023-07-02 RX ADMIN — BACLOFEN 10 MG: 10 TABLET ORAL at 01:07

## 2023-07-02 RX ADMIN — Medication 6 MG: at 09:07

## 2023-07-02 RX ADMIN — FLUOXETINE 20 MG: 20 CAPSULE ORAL at 09:07

## 2023-07-02 RX ADMIN — ATORVASTATIN CALCIUM 80 MG: 40 TABLET, FILM COATED ORAL at 08:07

## 2023-07-02 RX ADMIN — FLUOXETINE 20 MG: 20 CAPSULE ORAL at 01:07

## 2023-07-02 NOTE — ASSESSMENT & PLAN NOTE
"Patient is a 43 year old female hx of Leslie Leslie, chronic FOSTER who presents with altered mental status. Was mowing grass in the heat on Tuesday and has felt fatigued and "off" ever since then. Was noted to have facial twitching by family member. Continues to have rhythmic twitching of her mentalis muscle on exam today. MRI Brain w/o acute changes. MRA from April of this year with focal moderate to severe stenosis in the distal left MCA at the level of the MCA bifurcation with additional stenoses/irregularity extending to the proximal M2 branches. Consider progression of Leslie Leslie versus seizure (underlying structural abnormalities, recent heat exposure, ? Recent GI illness).    Recommendations:  -EEG pending, case discussed with epilepsy staff  -Consider further evaluation of intracranial vessels given hx of Leslie Leslie; Discussed with [facility] staff  -Seizure precautions  "

## 2023-07-02 NOTE — HPI
"Ms. Ponce is a 43 year old female pmh of L MCA stroke w/ residual hemiparesis, bergman bergman disease, recurrent falls, and headaches who presents with AMS. Initially presented to OSH on 7/1 and reported approximately 6 days of confusion and memory problems. Patient and friend report that symptoms started on Tuesday of last week. The patient does not recall most of the day Tuesday. Friend states that she mowed two lawns and then became very fatigued and out of it. She was also complaining of an upset stomach, lightheadedness, and dizziness. Denies syncope, vomiting or diarrhea. Since then the patient has not felt like herself. Feels like she is having "brain fog." Has been intermittently agitated and confused. Went to two ERs during this time but has not improved. Does not feel like she has returned to her baseline. She also has been having a headache on the R side of her head (states she hit her head on a car door on Monday) along with tinnitus and decreased hearing on the left. Patient reportedly had facial twitching while at an outside ED. The patient does not recall the twitching and the friend present was not there to see the twitching.  Outside workup was largely wnl. MRI done which showed stable appearing prior stroke along with subcentimeter abnormal signal foci in the bilateral supratentorial white matter. Patient transferred to [facility] for further eval.  "

## 2023-07-02 NOTE — PLAN OF CARE
Problem: Adult Inpatient Plan of Care  Goal: Optimal Comfort and Wellbeing  Outcome: Ongoing, Progressing  Goal: Readiness for Transition of Care  Outcome: Ongoing, Progressing     Problem: Adult Inpatient Plan of Care  Goal: Optimal Comfort and Wellbeing  Outcome: Ongoing, Progressing     Problem: Adult Inpatient Plan of Care  Goal: Readiness for Transition of Care  Outcome: Ongoing, Progressing       POC reviewed with patient and her sister at the bedside. Verbalization of understanding voiced. Questions and concerns addressed. Precautions maintained. Patient progressing towards goals. No events noted at present. Vital signs all shift. See flow sheet. Bed low and locked with call light within reach. Patients' sister remains at the bedside. POC ongoing.

## 2023-07-02 NOTE — PLAN OF CARE
Patient AAOx3, mom at bedside. Patient single parent of mentally disabled child. PCP correct on facesheet. Patient denies owning any DME. Family will provide transportation home.    07/02/23 0942   Discharge Assessment   Assessment Type Discharge Planning Assessment   Confirmed/corrected address, phone number and insurance Yes   Confirmed Demographics Correct on Facesheet   Source of Information patient;family   Communicated BLAS with patient/caregiver Date not available/Unable to determine   People in Home child(jayjay), dependent;parent(s)   Do you expect to return to your current living situation? Yes   Do you have help at home or someone to help you manage your care at home? Yes   Prior to hospitilization cognitive status: Alert/Oriented   Current cognitive status: Alert/Oriented   Equipment Currently Used at Home none   Readmission within 30 days? No   Do you currently have service(s) that help you manage your care at home? No   Do you take prescription medications? Yes   Do you have prescription coverage? Yes   Do you have any problems affording any of your prescribed medications? No   Is the patient taking medications as prescribed? yes   How do you get to doctors appointments? family or friend will provide   Are you on dialysis? No   Do you take coumadin? No   Discharge Plan A Home with family   Discharge Plan B Home   DME Needed Upon Discharge  none   Discharge Plan discussed with: Patient;Parent(s)   Transition of Care Barriers None   Physical Activity   On average, how many days per week do you engage in moderate to strenuous exercise (like a brisk walk)? 0 days   On average, how many minutes do you engage in exercise at this level? 0 min   Financial Resource Strain   How hard is it for you to pay for the very basics like food, housing, medical care, and heating? Somewhat   Housing Stability   In the last 12 months, was there a time when you were not able to pay the mortgage or rent on time? N   In the last  12 months, was there a time when you did not have a steady place to sleep or slept in a shelter (including now)? N   Transportation Needs   In the past 12 months, has lack of transportation kept you from medical appointments or from getting medications? no   In the past 12 months, has lack of transportation kept you from meetings, work, or from getting things needed for daily living? No   Food Insecurity   Within the past 12 months, you worried that your food would run out before you got the money to buy more. Never true   Within the past 12 months, the food you bought just didn't last and you didn't have money to get more. Never true   Stress   Do you feel stress - tense, restless, nervous, or anxious, or unable to sleep at night because your mind is troubled all the time - these days? Very much   Social Connections   In a typical week, how many times do you talk on the phone with family, friends, or neighbors? More than 3   How often do you get together with friends or relatives? More than 3   How often do you attend Temple or Confucianism services? More than 4   Do you belong to any clubs or organizations such as Temple groups, unions, fraternal or athletic groups, or school groups? No   How often do you attend meetings of the clubs or organizations you belong to? Never   Are you , , , , never , or living with a partner?    Alcohol Use   Q1: How often do you have a drink containing alcohol? Never   Q2: How many drinks containing alcohol do you have on a typical day when you are drinking? None   Q3: How often do you have six or more drinks on one occasion? Never     [facility] - Neurosurgery ([facility])  Initial Discharge Assessment       Primary Care Provider: Mariana Mendoza NP    Admission Diagnosis: Encephalopathy    Admission Date: 7/1/2023  Expected Discharge Date:     Transition of Care Barriers: None    Payor: MEDICAID / Plan: [facility] / Product Type:  Managed Medicaid /     Extended Emergency Contact Information  Primary Emergency Contact: Jeny Bales  Mobile Phone: [phone]  Relation: Sister    Discharge Plan A: Home with family  Discharge Plan B: Home      [facility] - [address] [place] [id number]  Phone: [phone] Fax: [phone]      Initial Assessment (most recent)       Adult Discharge Assessment - 07/02/23 0942          Discharge Assessment    Assessment Type Discharge Planning Assessment     Confirmed/corrected address, phone number and insurance Yes     Confirmed Demographics Correct on Facesheet     Source of Information patient;family     Communicated BLAS with patient/caregiver Date not available/Unable to determine     People in Home child(jayjay), dependent;parent(s)     Do you expect to return to your current living situation? Yes     Do you have help at home or someone to help you manage your care at home? Yes     Prior to hospitilization cognitive status: Alert/Oriented     Current cognitive status: Alert/Oriented     Equipment Currently Used at Home none     Readmission within 30 days? No     Do you currently have service(s) that help you manage your care at home? No     Do you take prescription medications? Yes     Do you have prescription coverage? Yes     Do you have any problems affording any of your prescribed medications? No     Is the patient taking medications as prescribed? yes     How do you get to doctors appointments? family or friend will provide     Are you on dialysis? No     Do you take coumadin? No     Discharge Plan A Home with family     Discharge Plan B Home     DME Needed Upon Discharge  none     Discharge Plan discussed with: Patient;Parent(s)     Transition of Care Barriers None        Physical Activity    On average, how many days per week do you engage in moderate to strenuous exercise (like a brisk walk)? 0 days     On average, how many minutes do  you engage in exercise at this level? 0 min        Financial Resource Strain    How hard is it for you to pay for the very basics like food, housing, medical care, and heating? Somewhat hard (P)         Housing Stability    In the last 12 months, was there a time when you were not able to pay the mortgage or rent on time? No (P)      In the last 12 months, was there a time when you did not have a steady place to sleep or slept in a shelter (including now)? No (P)         Transportation Needs    In the past 12 months, has lack of transportation kept you from medical appointments or from getting medications? No (P)      In the past 12 months, has lack of transportation kept you from meetings, work, or from getting things needed for daily living? No (P)         Food Insecurity    Within the past 12 months, you worried that your food would run out before you got the money to buy more. Never true (P)      Within the past 12 months, the food you bought just didn't last and you didn't have money to get more. Never true (P)         Stress    Do you feel stress - tense, restless, nervous, or anxious, or unable to sleep at night because your mind is troubled all the time - these days? Very much (P)         Social Connections    In a typical week, how many times do you talk on the phone with family, friends, or neighbors? More than three times a week (P)      How often do you get together with friends or relatives? More than three times a week (P)      How often do you attend Anglican or Scientology services? More than 4 times per year (P)      Do you belong to any clubs or organizations such as Anglican groups, unions, fraternal or athletic groups, or school groups? No (P)      How often do you attend meetings of the clubs or organizations you belong to? Never (P)      Are you , , , , never , or living with a partner?  (P)         Alcohol Use    Q1: How often do you have a drink  containing alcohol? Never (P)      Q2: How many drinks containing alcohol do you have on a typical day when you are drinking? Patient does not drink (P)      Q3: How often do you have six or more drinks on one occasion? Never (P)

## 2023-07-02 NOTE — PROGRESS NOTES
[facility] - Neurosurgery ([facility])  [facility] Medicine  Progress Note    Patient Name: Ally Ponce  MRN: [id number]  Patient Class: OP- Observation   Admission Date: 7/1/2023  Length of Stay: 0 days  Attending Physician: Tono Chilel MD  Primary Care Provider: Mariana Mendoza NP        Subjective:     Principal Problem:Acute encephalopathy        HPI:  Ally Ponce is a 43 year old white woman with Bergman bergman disease, history of left basal ganglia stroke on 7/27/2019 with right hemiparesis, spasticity, dysarthria, unsteady gait, chronic right-sided headaches, hyperlipidemia. She lives in [place], [place]. She is legally . Her neurologist is Dr. Caridad Benjamin.   She was seen at [facility] Emergency Department on 6/28/2023 for slurred speech and mild headaches since 6/25/2023 and intermittent confusion, memory loss, facial twitching, and worsening right lower extremity weakness causing dragging of her right leg since 6/26/2023. Brain MRI showed nonspecific subcentimeter abnormal signal foci in the bilateral supratentorial white matter with a differential of microvascular ischemic changes, focal gliosis, migraine headaches, demyelinating processes, Lyme disease, and vasculitis.    Symptoms persisted. Her sister informed Dr. Benjamin's clinic on 6/29/2023.    She was admitted to [facility] on 7/1/2023 with persistent symptoms, as well as facial fasciculations. On physical examination, she had intermittent rhythmic contractions of the chin and fasciculations of the left lower eyelid with nystagmus. She was awake and interactive while this happened. She had no visual field deficit. She had stable chronic dysarthria. Head CT showed nothing acute. Neurology was contacted at [facility] and transfer was recommended for Neurology evaluation and one possible continuous electroencephalogram. Upon arrival to [facility], she was  admitted to Hospital Medicine Team N. Symptoms had reportedly improved since she was admitted to [facility], but she continued to have delayed speech and it took her longer to think of what she wanted to say. Her dysarthria and right hemiparesis were at baseline.       Overview/Hospital Course:  No notes on file    Interval History: Seen by Neurology. Plan to evaluate for seizures with EEG and to evaluate intracranial vessels with some type of angiogram.    Review of Systems   Constitutional:  Negative for chills and fever.   Neurological:  Positive for weakness and headaches. Negative for syncope.   Objective:     Vital Signs (Most Recent):  Temp: 97.6 °F (36.4 °C) (07/02/23 0816)  Pulse: 68 (07/02/23 0816)  Resp: 18 (07/02/23 0816)  BP: 126/74 (07/02/23 0816)  SpO2: 98 % (07/02/23 0816) Vital Signs (24h Range):  Temp:  [97 °F (36.1 °C)-98 °F (36.7 °C)] 97.6 °F (36.4 °C)  Pulse:  [59-73] 68  Resp:  [14-19] 18  SpO2:  [96 %-98 %] 98 %  BP: (109-126)/(66-74) 126/74        There is no height or weight on file to calculate BMI.  No intake or output data in the 24 hours ending 07/02/23 1039      Physical Exam  Vitals and nursing note reviewed.   Constitutional:       General: She is not in acute distress.     Appearance: She is well-developed. She is not toxic-appearing or diaphoretic.   Pulmonary:      Effort: Pulmonary effort is normal. No respiratory distress.   Skin:     General: Skin is warm and dry.      Coloration: Skin is not jaundiced or pale.   Neurological:      Mental Status: She is alert.      Cranial Nerves: Dysarthria present.      Motor: Weakness present. No seizure activity.   Psychiatric:         Attention and Perception: Attention normal.         Mood and Affect: Affect normal.         Behavior: Behavior is not agitated or aggressive.           Significant Labs: All pertinent labs within the past 24 hours have been reviewed.    Significant Imaging: I have reviewed all pertinent imaging  results/findings within the past 24 hours.      Assessment/Plan:      * Acute encephalopathy  Neurology consulted. EEG. Will get some type of angiogram. Neuro checks. Delirium precautions.    Hemiparesis of right dominant side as late effect of cerebral infarction  History of ischemic left MCA stroke  Bergman bergman disease  Chronic. Continue home aspirin, clopidrogel, and atorvastatin.    Spasticity  Chronic. Stable. Continue home baclofen.    VTE Risk Mitigation (From admission, onward)         Ordered     IP VTE LOW RISK PATIENT  Once         07/01/23 2246     Place sequential compression device  Until discontinued         07/01/23 2246                Discharge Planning   BLAS:      Code Status: Full Code   Is the patient medically ready for discharge?: No    Reason for patient still in hospital (select all that apply): Patient unstable, Patient trending condition, Imaging and Consult recommendations  Discharge Plan A: Home with family                  Tono Chilel MD  Department of Hospital Medicine   [facility] - Neurosurgery ([facility])

## 2023-07-02 NOTE — SUBJECTIVE & OBJECTIVE
Interval History: Seen by Neurology. Plan to evaluate for seizures with EEG and to evaluate intracranial vessels with some type of angiogram.    Review of Systems   Constitutional:  Negative for chills and fever.   Neurological:  Positive for weakness and headaches. Negative for syncope.   Objective:     Vital Signs (Most Recent):  Temp: 97.6 °F (36.4 °C) (07/02/23 0816)  Pulse: 68 (07/02/23 0816)  Resp: 18 (07/02/23 0816)  BP: 126/74 (07/02/23 0816)  SpO2: 98 % (07/02/23 0816) Vital Signs (24h Range):  Temp:  [97 °F (36.1 °C)-98 °F (36.7 °C)] 97.6 °F (36.4 °C)  Pulse:  [59-73] 68  Resp:  [14-19] 18  SpO2:  [96 %-98 %] 98 %  BP: (109-126)/(66-74) 126/74        There is no height or weight on file to calculate BMI.  No intake or output data in the 24 hours ending 07/02/23 1039      Physical Exam  Vitals and nursing note reviewed.   Constitutional:       General: She is not in acute distress.     Appearance: She is well-developed. She is not toxic-appearing or diaphoretic.   Pulmonary:      Effort: Pulmonary effort is normal. No respiratory distress.   Skin:     General: Skin is warm and dry.      Coloration: Skin is not jaundiced or pale.   Neurological:      Mental Status: She is alert.      Cranial Nerves: Dysarthria present.      Motor: Weakness present. No seizure activity.   Psychiatric:         Attention and Perception: Attention normal.         Mood and Affect: Affect normal.         Behavior: Behavior is not agitated or aggressive.           Significant Labs: All pertinent labs within the past 24 hours have been reviewed.    Significant Imaging: I have reviewed all pertinent imaging results/findings within the past 24 hours.

## 2023-07-02 NOTE — NURSING
Nurses Note -- 4 Eyes      7/1/2023   9:14 PM      Skin assessed during: Admit      [x] No Altered Skin Integrity Present    []Prevention Measures Documented      [] Yes- Altered Skin Integrity Present or Discovered   [] LDA Added if Not in Epic (Describe Wound)   [] New Altered Skin Integrity was Present on Admit and Documented in LDA   [] Wound Image Taken    Wound Care Consulted? No    Attending Nurse:  OREN Anton     Second RN/Staff Member:  OREN Gates      Bruising noted to patients right lateral side, patient stated that is where I fell into the door of the [employer] [employer] trailer and I hit my side.

## 2023-07-02 NOTE — SUBJECTIVE & OBJECTIVE
Past Medical History:   Diagnosis Date    Dysarthria     Hemiparesis affecting right side as late effect of cerebrovascular accident (CVA)     High cholesterol     Hyperreflexia of lower extremity     [place]     Stroke 06/21/2019    Stroke 07/2019       Past Surgical History:   Procedure Laterality Date    CEREBRAL ANGIOGRAM N/A 10/29/2019    Procedure: ANGIOGRAM-CEREBRAL;  Surgeon: [facility] Diagnostic Provider;  [facility];  Service: Radiology;  Laterality: N/A;  Rm 190/[place]    CEREBRAL ANGIOGRAM N/A 8/18/2020    Procedure: ANGIOGRAM-CEREBRAL;  Surgeon: [facility] Diagnostic Provider;  Location: [facility] OR [facility];  Service: Radiology;  Laterality: N/A;  [id number]/[place]    HYSTERECTOMY      LAPAROSCOPIC SALPINGECTOMY Bilateral 11/8/2021    Procedure: SALPINGECTOMY, LAPAROSCOPIC;  Surgeon: Koko Calle MD;  Location: [facility] OR;  Service: OB/GYN;  Laterality: Bilateral;    LAPAROSCOPIC TOTAL HYSTERECTOMY N/A 11/8/2021    Procedure: HYSTERECTOMY, TOTAL, LAPAROSCOPIC;  Surgeon: Koko Calle MD;  Location: [facility] OR;  Service: OB/GYN;  Laterality: N/A;    TUBAL LIGATION  2005       Review of patient's allergies indicates:  No Known Allergies    No current facility-administered medications on file prior to encounter.     Current Outpatient Medications on File Prior to Encounter   Medication Sig    aspirin (ECOTRIN) 325 MG EC tablet Take 1 tablet (325 mg total) by mouth once daily.    atorvastatin (LIPITOR) 80 MG tablet Take 1 tablet (80 mg total) by mouth once daily.    baclofen (LIORESAL) 10 MG tablet Take 5 mg by mouth 2 (two) times daily as needed.    calcium carbonate (CALCIUM 300 ORAL) Take 50 mg by mouth Daily.    clopidogrel (PLAVIX) 75 mg tablet Take 75 mg by mouth once daily.    cyanocobalamin, vitamin B-12, 50 mcg tablet Take 50 mcg by mouth once daily.    FLUoxetine 20 MG capsule Take 1 capsule (20 mg total) by mouth every evening.    fluticasone propionate (FLONASE) 50 mcg/actuation nasal spray 1  spray by Each Nostril route daily as needed.    gabapentin (NEURONTIN) 100 MG capsule TAKE ONE CAPSULE BY MOUTH THREE TIMES DAILY    magnesium 250 mg Tab Take 250 mg by mouth once.    magnesium oxide (MAG-OX) 400 mg (241.3 mg magnesium) tablet TAKE ONE TABLET BY MOUTH ONCE DAILY    montelukast (SINGULAIR) 10 mg tablet Take 10 mg by mouth every evening.    multivitamin Tab Take 1 tablet by mouth once daily.    ondansetron (ZOFRAN) 4 MG tablet Take 1 tablet (4 mg total) by mouth every 8 (eight) hours as needed.    pantoprazole (PROTONIX) 40 MG tablet Take 40 mg by mouth daily as needed.    polyethylene glycol (GLYCOLAX) 17 gram PwPk Take 17 g by mouth daily as needed (constipation). (Patient not taking: Reported on 6/14/2022)     Family History       Problem Relation (Age of Onset)    Asthma Brother    Cancer Brother    Diabetes Father    Heart disease Father    Hyperlipidemia Mother, Sister    Hypertension Sister    Stroke Father          Tobacco Use    Smoking status: Never    Smokeless tobacco: Never   Substance and Sexual Activity    Alcohol use: Never    Drug use: Never    Sexual activity: Not Currently     Birth control/protection: See Surgical Hx     Review of Systems   Unable to perform ROS: Mental status change   Objective:     Vital Signs (Most Recent):  Temp: 97 °F (36.1 °C) (07/01/23 2353)  Pulse: (!) 59 (07/01/23 2353)  Resp: 14 (07/01/23 2353)  BP: 109/66 (07/01/23 2353)  SpO2: 96 % (07/01/23 2353) Vital Signs (24h Range):  Temp:  [97 °F (36.1 °C)-98 °F (36.7 °C)] 97 °F (36.1 °C)  Pulse:  [59-73] 59  Resp:  [14-19] 14  SpO2:  [96 %-98 %] 96 %  BP: (109-120)/(66-73) 109/66        There is no height or weight on file to calculate BMI.     Physical Exam  Vitals and nursing note reviewed.   Constitutional:       General: She is not in acute distress.     Appearance: She is well-developed.   HENT:      Head: Normocephalic and atraumatic.      Mouth/Throat:      Pharynx: No oropharyngeal exudate.   Eyes:       General: No scleral icterus.     Conjunctiva/sclera: Conjunctivae normal.   Cardiovascular:      Rate and Rhythm: Normal rate and regular rhythm.      Heart sounds: Normal heart sounds.   Pulmonary:      Effort: Pulmonary effort is normal. No respiratory distress.      Breath sounds: Normal breath sounds. No wheezing.   Abdominal:      General: Bowel sounds are normal. There is no distension.      Palpations: Abdomen is soft.      Tenderness: There is no abdominal tenderness.   Musculoskeletal:         General: No tenderness. Normal range of motion.      Cervical back: Normal range of motion and neck supple.   Lymphadenopathy:      Cervical: No cervical adenopathy.   Skin:     General: Skin is warm and dry.      Capillary Refill: Capillary refill takes less than 2 seconds.      Findings: No rash.   Neurological:      Mental Status: She is alert and oriented to person, place, and time.      Cranial Nerves: Dysarthria (baseline) present.      Sensory: No sensory deficit.      Motor: Weakness (R sided, stable at baseline) present.      Coordination: Coordination normal.   Psychiatric:         Behavior: Behavior normal.         Thought Content: Thought content normal.         Judgment: Judgment normal.              Significant Labs: All pertinent labs within the past 24 hours have been reviewed.  BMP: No results for input(s): GLU, NA, K, CL, CO2, BUN, CREATININE, CALCIUM, MG in the last 48 hours.  CBC:   Recent Labs   Lab 07/01/23  1302   HGB Negative       Significant Imaging: I have reviewed all pertinent imaging results/findings within the past 24 hours.

## 2023-07-02 NOTE — H&P
"[facility] - Neurosurgery ([facility])  Hospital Medicine  History & Physical    Patient Name: Ally Ponce  MRN: [id number]  Patient Class: OP- Observation  Admission Date: 7/1/2023  Attending Physician: Tono Chilel MD   Primary Care Provider: Mariana Mendoza NP         Patient information was obtained from patient, relative(s), past medical records and ER records.     Subjective:     Principal Problem:Acute encephalopathy    Chief Complaint: No chief complaint on file.       HPI: Ally Ponce is a 43 y.o. female with a PMHx of left basal ganglia stroke 2019, right-sided paresis, bergman bergman disease, hyperlipidemia, chronic right-sided headaches, and unsteady gait who presents to [facility] for neuro evaluation for acute encephalopathy and facial tremor.    Per transfer provider's note: "43-year-old female with history of left basal ganglia stroke 2019, right-sided paresis, bergman bergman disease, hyperlipidemia, chronic right-sided headaches, and unsteady gait presented to [facility] emergency department with confusion and facial fasciculations on July 1. She reported 6 days of intermittent confusion and memory difficulties.  Around June 29 she also noted hearing difficulties and ringing in her years.  Much of the history was obtained from family because the patient has difficulty answering questions fluidly.  No significant headache or vision change noted, and she has been on DAPT.  On exam she had slight decreased strength in the right leg.  She had intermittent rhythmic contraction of the chin and intermittent fasciculations of the left lower eyelid along with nystagmus.  During these episodes she was awake and interactive.  She had no visual field deficit.  She had stable chronic dysarthria.  ED spoke with Neurology at [facility].  Will plan transfer to Hospital Medicine at [facility] in step-down status for neurology evaluation 1 possible continuous EEG."    Patient evaluated at bedside upon arrival " to [facility]. Family at bedside assists with history. Patient developed intermittent confusion, memory loss, facial twitching, and intermittent worsening RLE weakness on Monday. She also had delayed speech, increased difficulty with walking and dragging her right leg, worsening slurred speech, and mild headache.  Symptoms have improved since admit to OSH but she still has delayed speech and taker her longer to think of what she wants to say. Her dysarthria and R sided weakness if currently at baseline. Patient is otherwise doing well without further complaints at this time.       Past Medical History:   Diagnosis Date    Dysarthria     Hemiparesis affecting right side as late effect of cerebrovascular accident (CVA)     High cholesterol     Hyperreflexia of lower extremity     Moyamoya     Stroke 06/21/2019    Stroke 07/2019       Past Surgical History:   Procedure Laterality Date    CEREBRAL ANGIOGRAM N/A 10/29/2019    Procedure: ANGIOGRAM-CEREBRAL;  Surgeon: [facility] Diagnostic Provider;  Location: [facility] OR [address] [place] [address];  Service: Radiology;  Laterality: N/A;  Rm 190/Marcella    CEREBRAL ANGIOGRAM N/A 8/18/2020    Procedure: ANGIOGRAM-CEREBRAL;  Surgeon: [facility] Diagnostic Provider;  Location: [facility] OR [address] [place] [address];  Service: Radiology;  Laterality: N/A;  Rm190/Marcella    HYSTERECTOMY      LAPAROSCOPIC SALPINGECTOMY Bilateral 11/8/2021    Procedure: SALPINGECTOMY, LAPAROSCOPIC;  Surgeon: Koko Calle MD;  Location: [facility] OR;  Service: OB/GYN;  Laterality: Bilateral;    LAPAROSCOPIC TOTAL HYSTERECTOMY N/A 11/8/2021    Procedure: HYSTERECTOMY, TOTAL, LAPAROSCOPIC;  Surgeon: Koko Calle MD;  Location: [facility] OR;  Service: OB/GYN;  Laterality: N/A;    TUBAL LIGATION  2005       Review of patient's allergies indicates:  No Known Allergies    No current facility-administered medications on file prior to encounter.     Current Outpatient Medications on File Prior to Encounter   Medication Sig    aspirin (ECOTRIN) 325 MG EC  tablet Take 1 tablet (325 mg total) by mouth once daily.    atorvastatin (LIPITOR) 80 MG tablet Take 1 tablet (80 mg total) by mouth once daily.    baclofen (LIORESAL) 10 MG tablet Take 5 mg by mouth 2 (two) times daily as needed.    calcium carbonate (CALCIUM 300 ORAL) Take 50 mg by mouth Daily.    clopidogrel (PLAVIX) 75 mg tablet Take 75 mg by mouth once daily.    cyanocobalamin, vitamin B-12, 50 mcg tablet Take 50 mcg by mouth once daily.    FLUoxetine 20 MG capsule Take 1 capsule (20 mg total) by mouth every evening.    fluticasone propionate (FLONASE) 50 mcg/actuation nasal spray 1 spray by Each Nostril route daily as needed.    gabapentin (NEURONTIN) 100 MG capsule TAKE ONE CAPSULE BY MOUTH THREE TIMES DAILY    magnesium 250 mg Tab Take 250 mg by mouth once.    magnesium oxide (MAG-OX) 400 mg (241.3 mg magnesium) tablet TAKE ONE TABLET BY MOUTH ONCE DAILY    montelukast (SINGULAIR) 10 mg tablet Take 10 mg by mouth every evening.    multivitamin Tab Take 1 tablet by mouth once daily.    ondansetron (ZOFRAN) 4 MG tablet Take 1 tablet (4 mg total) by mouth every 8 (eight) hours as needed.    pantoprazole (PROTONIX) 40 MG tablet Take 40 mg by mouth daily as needed.    polyethylene glycol (GLYCOLAX) 17 gram PwPk Take 17 g by mouth daily as needed (constipation). (Patient not taking: Reported on 6/14/2022)     Family History       Problem Relation (Age of Onset)    Asthma Brother    Cancer Brother    Diabetes Father    Heart disease Father    Hyperlipidemia Mother, Sister    Hypertension Sister    Stroke Father          Tobacco Use    Smoking status: Never    Smokeless tobacco: Never   Substance and Sexual Activity    Alcohol use: Never    Drug use: Never    Sexual activity: Not Currently     Birth control/protection: See Surgical Hx     Review of Systems   Unable to perform ROS: Mental status change   Objective:     Vital Signs (Most Recent):  Temp: 97 °F (36.1 °C) (07/01/23 2353)  Pulse: (!)  59 (07/01/23 2353)  Resp: 14 (07/01/23 2353)  BP: 109/66 (07/01/23 2353)  SpO2: 96 % (07/01/23 2353) Vital Signs (24h Range):  Temp:  [97 °F (36.1 °C)-98 °F (36.7 °C)] 97 °F (36.1 °C)  Pulse:  [59-73] 59  Resp:  [14-19] 14  SpO2:  [96 %-98 %] 96 %  BP: (109-120)/(66-73) 109/66        There is no height or weight on file to calculate BMI.     Physical Exam  Vitals and nursing note reviewed.   Constitutional:       General: She is not in acute distress.     Appearance: She is well-developed.   HENT:      Head: Normocephalic and atraumatic.      Mouth/Throat:      Pharynx: No oropharyngeal exudate.   Eyes:      General: No scleral icterus.     Conjunctiva/sclera: Conjunctivae normal.   Cardiovascular:      Rate and Rhythm: Normal rate and regular rhythm.      Heart sounds: Normal heart sounds.   Pulmonary:      Effort: Pulmonary effort is normal. No respiratory distress.      Breath sounds: Normal breath sounds. No wheezing.   Abdominal:      General: Bowel sounds are normal. There is no distension.      Palpations: Abdomen is soft.      Tenderness: There is no abdominal tenderness.   Musculoskeletal:         General: No tenderness. Normal range of motion.      Cervical back: Normal range of motion and neck supple.   Lymphadenopathy:      Cervical: No cervical adenopathy.   Skin:     General: Skin is warm and dry.      Capillary Refill: Capillary refill takes less than 2 seconds.      Findings: No rash.   Neurological:      Mental Status: She is alert and oriented to person, place, and time.      Cranial Nerves: Dysarthria (baseline) present.      Sensory: No sensory deficit.      Motor: Weakness (R sided, stable at baseline) present.      Coordination: Coordination normal.   Psychiatric:         Behavior: Behavior normal.         Thought Content: Thought content normal.         Judgment: Judgment normal.              Significant Labs: All pertinent labs within the past 24 hours have been reviewed.  BMP: No results for  input(s): GLU, NA, K, CL, CO2, BUN, CREATININE, CALCIUM, MG in the last 48 hours.  CBC:   Recent Labs   Lab 07/01/23  1302   HGB Negative       Significant Imaging: I have reviewed all pertinent imaging results/findings within the past 24 hours.    Assessment/Plan:     * Acute encephalopathy  Bergman bergman disease   - presents as a transfer from OSH for AMS, facial fasciculations, difficulty walking and HA  - R sided deficits stable at baseline on my exam  - CTH at OSH negative for acute findings  - neuro consulted; appreciate recs  - EEG ordered  - vit levels, RPR in AM  - neuro checks, delirium precautions     Hemiparesis of right dominant side as late effect of cerebral infarction  - stable at baseline  - continue DAPT, statin     Spasticity  - chronic issue  - continue baclofen       VTE Risk Mitigation (From admission, onward)         Ordered     IP VTE LOW RISK PATIENT  Once         07/01/23 2246     Place sequential compression device  Until discontinued         07/01/23 2246                     On 07/02/2023, patient should be placed in hospital observation services under my care in collaboration with Dr. Ced Byrnes.      Merced Luu PA-C  Department of Hospital Medicine  [facility] - Neurosurgery ([facility])

## 2023-07-02 NOTE — ASSESSMENT & PLAN NOTE
History of ischemic left MCA stroke  Bergman bergman disease  Chronic. Continue home aspirin, clopidrogel, and atorvastatin.

## 2023-07-02 NOTE — HPI
Ally Ponce is a 43 year old white woman with Bergman bergman disease, history of left basal ganglia stroke on 7/27/2019 with right hemiparesis, spasticity, dysarthria, unsteady gait, chronic right-sided headaches, hyperlipidemia. She lives in [place], [place]. She is legally . Her neurologist is Dr. Caridad Benjamin.   She was seen at [facility] Emergency Department on 6/28/2023 for slurred speech and mild headaches since 6/25/2023 and intermittent confusion, memory loss, facial twitching, and worsening right lower extremity weakness causing dragging of her right leg since 6/26/2023. Brain MRI showed nonspecific subcentimeter abnormal signal foci in the bilateral supratentorial white matter with a differential of microvascular ischemic changes, focal gliosis, migraine headaches, demyelinating processes, Lyme disease, and vasculitis.    Symptoms persisted. Her sister informed Dr. Benjamin's clinic on 6/29/2023.    She was admitted to [facility] on 7/1/2023 with persistent symptoms, as well as facial fasciculations. On physical examination, she had intermittent rhythmic contractions of the chin and fasciculations of the left lower eyelid with nystagmus. She was awake and interactive while this happened. She had no visual field deficit. She had stable chronic dysarthria. Head CT showed nothing acute. Neurology was contacted at [facility] and transfer was recommended for Neurology evaluation and one possible continuous electroencephalogram. Upon arrival to [facility], she was admitted to Hospital Medicine Team N. Symptoms had reportedly improved since she was admitted to [facility], but she continued to have delayed speech and it took her longer to think of what she wanted to say. Her dysarthria and right hemiparesis were at baseline.

## 2023-07-02 NOTE — PROCEDURES
Preliminary EEG Read  Read 16:14-17:06    Background:   The background is symmetric and continuous, with a normal AP gradient and a PDR that reaches up to 8 hz at maximum alertness.  Occasional intermittent generalized theta/delta range slowing is present.  No epileptiform discharges or lateralizing features are noted.        Events:  Patient has rhythmic twitching of chin (appears to be equal involvement of bilateral mentalis muscles) frequently throughout study.      Impression:   Abnormal EEG consistent with a mild diffuse encephalopathy.  The patient has frequent clinical events of rhythmic chin movements which are not associated with epileptic activity on the EEG.  These events could be from a subcortical process - more of a movement disorder than a cortically mediated/epileptic process.      Dede Rodrigues MD  [facility]   Department of Neurology/Epilepsy

## 2023-07-02 NOTE — ASSESSMENT & PLAN NOTE
Bergman bergman disease   - presents as a transfer from OSH for AMS, facial fasciculations, difficulty walking and HA  - R sided deficits stable at baseline on my exam  - CTH at OSH negative for acute findings  - neuro consulted; appreciate recs  - EEG ordered  - vit levels, RPR in AM  - neuro checks, delirium precautions

## 2023-07-02 NOTE — CONSULTS
"[facility] - Neurosurgery ([facility])  Neurology  Consult Note    Patient Name: Ally Ponce  MRN: [id number]  Admission Date: 7/1/2023  Hospital Length of Stay: 0 days  Code Status: Full Code   Attending Provider: Tono Chilel MD   Consulting Provider: Aashish Ordonez MD  Primary Care Physician: Mariana Mendoza NP  Principal Problem:Acute encephalopathy    Inpatient consult to Neurology  Consult performed by: Aashish Ordonez MD  Consult ordered by: Merced Luu PA-C         Subjective:     Chief Complaint:  AMS     HPI:   Ms. Ponce is a 43 year old female pmh of L MCA stroke w/ residual hemiparesis, bergman bergman disease, recurrent falls, and headaches who presents with AMS. Initially presented to OSH on 7/1 and reported approximately 6 days of confusion and memory problems. Patient and friend report that symptoms started on Tuesday of last week. The patient does not recall most of the day Tuesday. Friend states that she mowed two lawns and then became very fatigued and out of it. She was also complaining of an upset stomach, lightheadedness, and dizziness. Denies syncope, vomiting or diarrhea. Since then the patient has not felt like herself. Feels like she is having "brain fog." Has been intermittently agitated and confused. Went to two ERs during this time but has not improved. Does not feel like she has returned to her baseline. She also has been having a headache on the R side of her head (states she hit her head on a car door on Monday) along with tinnitus and decreased hearing on the left. Patient reportedly had facial twitching while at an outside ED. The patient does not recall the twitching and the friend present was not there to see the twitching.  Outside workup was largely wnl. MRI done which showed stable appearing prior stroke along with subcentimeter abnormal signal foci in the bilateral supratentorial white matter. Patient transferred to [facility] [place] [facility] eval.       Past Medical " History:   Diagnosis Date    Dysarthria     Hemiparesis affecting right side as late effect of cerebrovascular accident (CVA)     High cholesterol     Hyperreflexia of lower extremity     Moyamoya     Stroke 06/21/2019    Stroke 07/2019       Past Surgical History:   Procedure Laterality Date    CEREBRAL ANGIOGRAM N/A 10/29/2019    Procedure: ANGIOGRAM-CEREBRAL;  Surgeon: [facility] Diagnostic Provider;  [facility];  Service: Radiology;  Laterality: N/A;  Rm 190/Felicia    CEREBRAL ANGIOGRAM N/A 08/18/2020    Procedure: ANGIOGRAM-CEREBRAL;  Surgeon: [facility] Diagnostic Provider;  [facility];  Service: Radiology;  Laterality: N/A;  Rm190/[place]    LAPAROSCOPIC SALPINGECTOMY Bilateral 11/08/2021    Procedure: SALPINGECTOMY, LAPAROSCOPIC;  Surgeon: Koko Calle MD;  Location: [facility] OR;  Service: OB/GYN;  Laterality: Bilateral;    LAPAROSCOPIC TOTAL HYSTERECTOMY N/A 11/08/2021    Procedure: HYSTERECTOMY, TOTAL, LAPAROSCOPIC;  Surgeon: Koko Calle MD;  Location: [facility] OR;  Service: OB/GYN;  Laterality: N/A;    TUBAL LIGATION  2005       Review of patient's allergies indicates:  No Known Allergies    Current Neurological Medications: asa/plavix    No current facility-administered medications on file prior to encounter.     Current Outpatient Medications on File Prior to Encounter   Medication Sig    aspirin (ECOTRIN) 325 MG EC tablet Take 1 tablet (325 mg total) by mouth once daily.    atorvastatin (LIPITOR) 80 MG tablet Take 1 tablet (80 mg total) by mouth once daily.    baclofen (LIORESAL) 10 MG tablet Take 5 mg by mouth 2 (two) times daily as needed.    calcium carbonate (CALCIUM 300 ORAL) Take 50 mg by mouth Daily.    clopidogrel (PLAVIX) 75 mg tablet Take 75 mg by mouth once daily.    cyanocobalamin, vitamin B-12, 50 mcg tablet Take 50 mcg by mouth once daily.    FLUoxetine 20 MG capsule Take 1 capsule (20 mg total) by mouth every evening.    fluticasone propionate  (FLONASE) 50 mcg/actuation nasal spray 1 spray by Each Nostril route daily as needed.    gabapentin (NEURONTIN) 100 MG capsule TAKE ONE CAPSULE BY MOUTH THREE TIMES DAILY    magnesium 250 mg Tab Take 250 mg by mouth once.    magnesium oxide (MAG-OX) 400 mg (241.3 mg magnesium) tablet TAKE ONE TABLET BY MOUTH ONCE DAILY    montelukast (SINGULAIR) 10 mg tablet Take 10 mg by mouth every evening.    multivitamin Tab Take 1 tablet by mouth once daily.    ondansetron (ZOFRAN) 4 MG tablet Take 1 tablet (4 mg total) by mouth every 8 (eight) hours as needed.    pantoprazole (PROTONIX) 40 MG tablet Take 40 mg by mouth daily as needed.    polyethylene glycol (GLYCOLAX) 17 gram PwPk Take 17 g by mouth daily as needed (constipation). (Patient not taking: Reported on 6/14/2022)     Family History       Problem Relation (Age of Onset)    Asthma Brother    Cancer Brother    Diabetes Father    Heart disease Father    Hyperlipidemia Mother, Sister    Hypertension Sister    Stroke Father          Tobacco Use    Smoking status: Never    Smokeless tobacco: Never   Substance and Sexual Activity    Alcohol use: Never    Drug use: Never    Sexual activity: Not Currently     Birth control/protection: See Surgical Hx     Review of Systems   Constitutional:  Positive for fatigue. Negative for chills and fever.   Respiratory:  Negative for cough and shortness of breath.    Cardiovascular:  Negative for chest pain.   Gastrointestinal:  Positive for nausea. Negative for abdominal pain, diarrhea and vomiting.   Genitourinary:  Positive for decreased urine volume. Negative for dysuria.   Musculoskeletal:  Positive for gait problem. Negative for back pain.   Skin:  Negative for color change and rash.   Neurological:  Positive for dizziness, light-headedness and headaches. Negative for syncope.   Psychiatric/Behavioral:  Positive for behavioral problems and confusion.    Objective:     Vital Signs (Most Recent):  Temp: 97.6 °F (36.4 °C)  (07/02/23 0816)  Pulse: 68 (07/02/23 0816)  Resp: 18 (07/02/23 0816)  BP: 126/74 (07/02/23 0816)  SpO2: 98 % (07/02/23 0816) Vital Signs (24h Range):  Temp:  [97 °F (36.1 °C)-98 °F (36.7 °C)] 97.6 °F (36.4 °C)  Pulse:  [59-73] 68  Resp:  [14-19] 18  SpO2:  [96 %-98 %] 98 %  BP: (109-126)/(66-74) 126/74        There is no height or weight on file to calculate BMI.     Physical Exam  Constitutional:       General: She is not in acute distress.  Pulmonary:      Effort: Pulmonary effort is normal. No respiratory distress.   Abdominal:      General: Abdomen is flat. There is no distension.   Musculoskeletal:         General: No swelling. Normal range of motion.   Neurological:      Mental Status: She is alert and oriented to person, place, and time.      Cranial Nerves: Cranial nerves 2-12 are intact.      Coordination: Finger-Nose-Finger Test normal.      Comments: Rhythmic contraction of chin/mentalis   Psychiatric:         Mood and Affect: Mood normal.         Behavior: Behavior normal.        NEUROLOGICAL EXAMINATION:     MENTAL STATUS   Oriented to person, place, and time.   Attention: normal. Concentration: normal.   Level of consciousness: alert    CRANIAL NERVES   Cranial nerves II through XII intact.     MOTOR EXAM     Strength   Strength 5/5 except as noted.        Strength 4/5 in BLE flexion/extension  5/5 dorsiflexion/plantar flexion       REFLEXES     Reflexes   Reflexes 2+ except as noted.     SENSORY EXAM   Light touch normal.     GAIT AND COORDINATION      Coordination   Finger to nose coordination: normal    Tremor   Resting tremor: absent    Significant Labs: CBC:   Recent Labs   Lab 07/01/23  1302 07/02/23 0436   WBC  --  8.73   HGB Negative 14.3   HCT  --  42.9   PLT  --  263     CMP:   Recent Labs   Lab 07/02/23 0436         K 4.0      CO2 22*   BUN 11   CREATININE 0.8   CALCIUM 9.3   MG 1.9   PROT 7.1   ALBUMIN 3.8   BILITOT 0.6   ALKPHOS 69   AST 14   ALT 16   ANIONGAP 10  "    All pertinent lab results from the past 24 hours have been reviewed.    Significant Imaging: I have reviewed all pertinent imaging results/findings within the past 24 hours.    Assessment and Plan:     * Acute encephalopathy  Patient is a 43 year old female hx of Leslie Leslie, chronic FOSTER who presents with altered mental status. Was mowing grass in the heat on Tuesday and has felt fatigued and "off" ever since then. Was noted to have facial twitching by family member. Continues to have rhythmic twitching of her mentalis muscle on exam today. MRI Brain w/o acute changes. MRA from April of this year with focal moderate to severe stenosis in the distal left MCA at the level of the MCA bifurcation with additional stenoses/irregularity extending to the proximal M2 branches. Consider progression of Leslie Leslie versus seizure (underlying structural abnormalities, recent heat exposure, ? Recent GI illness).    Recommendations:  -EEG pending, case discussed with epilepsy staff  -Consider further evaluation of intracranial vessels given hx of Leslie Leslie; Discussed with [facility] staff  -Seizure precautions        VTE Risk Mitigation (From admission, onward)         Ordered     IP VTE LOW RISK PATIENT  Once         07/01/23 2246     Place sequential compression device  Until discontinued         07/01/23 2246                Thank you for your consult. I will follow-up with patient. Please contact us if you have any additional questions.    Aashish Ordonez MD  Neurology  [facility] - Neurosurgery (Hospital)  "

## 2023-07-02 NOTE — SUBJECTIVE & OBJECTIVE
Past Medical History:   Diagnosis Date    Dysarthria     Hemiparesis affecting right side as late effect of cerebrovascular accident (CVA)     High cholesterol     Hyperreflexia of lower extremity     [place]     Stroke 06/21/2019    Stroke 07/2019       Past Surgical History:   Procedure Laterality Date    CEREBRAL ANGIOGRAM N/A 10/29/2019    Procedure: ANGIOGRAM-CEREBRAL;  Surgeon: [facility];  [facility];  Service: Radiology;  Laterality: N/A;  Rm 190/Joseph    CEREBRAL ANGIOGRAM N/A 08/18/2020    Procedure: ANGIOGRAM-CEREBRAL;  Surgeon: [facility] Provider;  Location: [facility] OR [facility];  Service: Radiology;  Laterality: N/A;  [id number]    LAPAROSCOPIC SALPINGECTOMY Bilateral 11/08/2021    Procedure: SALPINGECTOMY, LAPAROSCOPIC;  Surgeon: Koko Calle MD;  Location: [facility] OR;  Service: OB/GYN;  Laterality: Bilateral;    LAPAROSCOPIC TOTAL HYSTERECTOMY N/A 11/08/2021    Procedure: HYSTERECTOMY, TOTAL, LAPAROSCOPIC;  Surgeon: Koko Calle MD;  Location: [facility] OR;  Service: OB/GYN;  Laterality: N/A;    TUBAL LIGATION  2005       Review of patient's allergies indicates:  No Known Allergies    Current Neurological Medications: asa/plavix    No current facility-administered medications on file prior to encounter.     Current Outpatient Medications on File Prior to Encounter   Medication Sig    aspirin (ECOTRIN) 325 MG EC tablet Take 1 tablet (325 mg total) by mouth once daily.    atorvastatin (LIPITOR) 80 MG tablet Take 1 tablet (80 mg total) by mouth once daily.    baclofen (LIORESAL) 10 MG tablet Take 5 mg by mouth 2 (two) times daily as needed.    calcium carbonate (CALCIUM 300 ORAL) Take 50 mg by mouth Daily.    clopidogrel (PLAVIX) 75 mg tablet Take 75 mg by mouth once daily.    cyanocobalamin, vitamin B-12, 50 mcg tablet Take 50 mcg by mouth once daily.    FLUoxetine 20 MG capsule Take 1 capsule (20 mg total) by mouth every evening.    fluticasone propionate (FLONASE)  50 mcg/actuation nasal spray 1 spray by Each Nostril route daily as needed.    gabapentin (NEURONTIN) 100 MG capsule TAKE ONE CAPSULE BY MOUTH THREE TIMES DAILY    magnesium 250 mg Tab Take 250 mg by mouth once.    magnesium oxide (MAG-OX) 400 mg (241.3 mg magnesium) tablet TAKE ONE TABLET BY MOUTH ONCE DAILY    montelukast (SINGULAIR) 10 mg tablet Take 10 mg by mouth every evening.    multivitamin Tab Take 1 tablet by mouth once daily.    ondansetron (ZOFRAN) 4 MG tablet Take 1 tablet (4 mg total) by mouth every 8 (eight) hours as needed.    pantoprazole (PROTONIX) 40 MG tablet Take 40 mg by mouth daily as needed.    polyethylene glycol (GLYCOLAX) 17 gram PwPk Take 17 g by mouth daily as needed (constipation). (Patient not taking: Reported on 6/14/2022)     Family History       Problem Relation (Age of Onset)    Asthma Brother    Cancer Brother    Diabetes Father    Heart disease Father    Hyperlipidemia Mother, Sister    Hypertension Sister    Stroke Father          Tobacco Use    Smoking status: Never    Smokeless tobacco: Never   Substance and Sexual Activity    Alcohol use: Never    Drug use: Never    Sexual activity: Not Currently     Birth control/protection: See Surgical Hx     Review of Systems   Constitutional:  Positive for fatigue. Negative for chills and fever.   Respiratory:  Negative for cough and shortness of breath.    Cardiovascular:  Negative for chest pain.   Gastrointestinal:  Positive for nausea. Negative for abdominal pain, diarrhea and vomiting.   Genitourinary:  Positive for decreased urine volume. Negative for dysuria.   Musculoskeletal:  Positive for gait problem. Negative for back pain.   Skin:  Negative for color change and rash.   Neurological:  Positive for dizziness, light-headedness and headaches. Negative for syncope.   Psychiatric/Behavioral:  Positive for behavioral problems and confusion.    Objective:     Vital Signs (Most Recent):  Temp: 97.6 °F (36.4 °C) (07/02/23  0816)  Pulse: 68 (07/02/23 0816)  Resp: 18 (07/02/23 0816)  BP: 126/74 (07/02/23 0816)  SpO2: 98 % (07/02/23 0816) Vital Signs (24h Range):  Temp:  [97 °F (36.1 °C)-98 °F (36.7 °C)] 97.6 °F (36.4 °C)  Pulse:  [59-73] 68  Resp:  [14-19] 18  SpO2:  [96 %-98 %] 98 %  BP: (109-126)/(66-74) 126/74        There is no height or weight on file to calculate BMI.     Physical Exam  Constitutional:       General: She is not in acute distress.  Pulmonary:      Effort: Pulmonary effort is normal. No respiratory distress.   Abdominal:      General: Abdomen is flat. There is no distension.   Musculoskeletal:         General: No swelling. Normal range of motion.   Neurological:      Mental Status: She is alert and oriented to person, place, and time.      Cranial Nerves: Cranial nerves 2-12 are intact.      Coordination: Finger-Nose-Finger Test normal.      Comments: Rhythmic contraction of chin/mentalis   Psychiatric:         Mood and Affect: Mood normal.         Behavior: Behavior normal.        NEUROLOGICAL EXAMINATION:     MENTAL STATUS   Oriented to person, place, and time.   Attention: normal. Concentration: normal.   Level of consciousness: alert    CRANIAL NERVES   Cranial nerves II through XII intact.     MOTOR EXAM     Strength   Strength 5/5 except as noted.        Strength 4/5 in BLE flexion/extension  5/5 dorsiflexion/plantar flexion       REFLEXES     Reflexes   Reflexes 2+ except as noted.     SENSORY EXAM   Light touch normal.     GAIT AND COORDINATION      Coordination   Finger to nose coordination: normal    Tremor   Resting tremor: absent    Significant Labs: CBC:   Recent Labs   Lab 07/01/23  1302 07/02/23  0436   WBC  --  8.73   HGB Negative 14.3   HCT  --  42.9   PLT  --  263     CMP:   Recent Labs   Lab 07/02/23  0436         K 4.0      CO2 22*   BUN 11   CREATININE 0.8   CALCIUM 9.3   MG 1.9   PROT 7.1   ALBUMIN 3.8   BILITOT 0.6   ALKPHOS 69   AST 14   ALT 16   ANIONGAP 10     All  pertinent lab results from the past 24 hours have been reviewed.    Significant Imaging: I have reviewed all pertinent imaging results/findings within the past 24 hours.

## 2023-07-03 LAB
ALBUMIN SERPL BCP-MCNC: 3.7 G/DL (ref 3.5–5.2)
ALP SERPL-CCNC: 71 U/L (ref 55–135)
ALT SERPL W/O P-5'-P-CCNC: 19 U/L (ref 10–44)
ANION GAP SERPL CALC-SCNC: 12 MMOL/L (ref 8–16)
AST SERPL-CCNC: 14 U/L (ref 10–40)
BASOPHILS # BLD AUTO: 0.07 K/UL (ref 0–0.2)
BASOPHILS NFR BLD: 0.8 % (ref 0–1.9)
BILIRUB SERPL-MCNC: 0.5 MG/DL (ref 0.1–1)
BUN SERPL-MCNC: 18 MG/DL (ref 6–20)
CALCIUM SERPL-MCNC: 9.3 MG/DL (ref 8.7–10.5)
CHLORIDE SERPL-SCNC: 106 MMOL/L (ref 95–110)
CO2 SERPL-SCNC: 23 MMOL/L (ref 23–29)
CREAT SERPL-MCNC: 0.8 MG/DL (ref 0.5–1.4)
DIFFERENTIAL METHOD: NORMAL
EOSINOPHIL # BLD AUTO: 0.2 K/UL (ref 0–0.5)
EOSINOPHIL NFR BLD: 2.8 % (ref 0–8)
ERYTHROCYTE [DISTWIDTH] IN BLOOD BY AUTOMATED COUNT: 12.7 % (ref 11.5–14.5)
EST. GFR  (NO RACE VARIABLE): >60 ML/MIN/1.73 M^2
GLUCOSE SERPL-MCNC: 104 MG/DL (ref 70–110)
HCT VFR BLD AUTO: 45.5 % (ref 37–48.5)
HGB BLD-MCNC: 14.9 G/DL (ref 12–16)
IMM GRANULOCYTES # BLD AUTO: 0.02 K/UL (ref 0–0.04)
IMM GRANULOCYTES NFR BLD AUTO: 0.2 % (ref 0–0.5)
LYMPHOCYTES # BLD AUTO: 2.6 K/UL (ref 1–4.8)
LYMPHOCYTES NFR BLD: 31.5 % (ref 18–48)
MAGNESIUM SERPL-MCNC: 1.7 MG/DL (ref 1.6–2.6)
MCH RBC QN AUTO: 30.1 PG (ref 27–31)
MCHC RBC AUTO-ENTMCNC: 32.7 G/DL (ref 32–36)
MCV RBC AUTO: 92 FL (ref 82–98)
MONOCYTES # BLD AUTO: 0.7 K/UL (ref 0.3–1)
MONOCYTES NFR BLD: 8.4 % (ref 4–15)
NEUTROPHILS # BLD AUTO: 4.7 K/UL (ref 1.8–7.7)
NEUTROPHILS NFR BLD: 56.3 % (ref 38–73)
NRBC BLD-RTO: 0 /100 WBC
PHOSPHATE SERPL-MCNC: 3.8 MG/DL (ref 2.7–4.5)
PLATELET # BLD AUTO: 233 K/UL (ref 150–450)
PMV BLD AUTO: 10.9 FL (ref 9.2–12.9)
POTASSIUM SERPL-SCNC: 4.1 MMOL/L (ref 3.5–5.1)
PROT SERPL-MCNC: 7 G/DL (ref 6–8.4)
RBC # BLD AUTO: 4.95 M/UL (ref 4–5.4)
RPR SER QL: NORMAL
SODIUM SERPL-SCNC: 141 MMOL/L (ref 136–145)
WBC # BLD AUTO: 8.26 K/UL (ref 3.9–12.7)

## 2023-07-03 PROCEDURE — 83735 ASSAY OF MAGNESIUM: CPT | Performed by: PHYSICIAN ASSISTANT

## 2023-07-03 PROCEDURE — 11000001 HC ACUTE MED/SURG PRIVATE ROOM

## 2023-07-03 PROCEDURE — 80053 COMPREHEN METABOLIC PANEL: CPT | Performed by: PHYSICIAN ASSISTANT

## 2023-07-03 PROCEDURE — 99233 SBSQ HOSP IP/OBS HIGH 50: CPT | Mod: ,,, | Performed by: NEUROLOGICAL SURGERY

## 2023-07-03 PROCEDURE — 99233 PR SUBSEQUENT HOSPITAL CARE,LEVL III: ICD-10-PCS | Mod: ,,, | Performed by: PSYCHIATRY & NEUROLOGY

## 2023-07-03 PROCEDURE — 36415 COLL VENOUS BLD VENIPUNCTURE: CPT | Performed by: PHYSICIAN ASSISTANT

## 2023-07-03 PROCEDURE — 99233 PR SUBSEQUENT HOSPITAL CARE,LEVL III: ICD-10-PCS | Mod: ,,, | Performed by: NEUROLOGICAL SURGERY

## 2023-07-03 PROCEDURE — 99233 SBSQ HOSP IP/OBS HIGH 50: CPT | Mod: ,,, | Performed by: HOSPITALIST

## 2023-07-03 PROCEDURE — 99233 PR SUBSEQUENT HOSPITAL CARE,LEVL III: ICD-10-PCS | Mod: ,,, | Performed by: HOSPITALIST

## 2023-07-03 PROCEDURE — 85025 COMPLETE CBC W/AUTO DIFF WBC: CPT | Performed by: PHYSICIAN ASSISTANT

## 2023-07-03 PROCEDURE — 84100 ASSAY OF PHOSPHORUS: CPT | Performed by: PHYSICIAN ASSISTANT

## 2023-07-03 PROCEDURE — 99233 SBSQ HOSP IP/OBS HIGH 50: CPT | Mod: ,,, | Performed by: PSYCHIATRY & NEUROLOGY

## 2023-07-03 PROCEDURE — 25000003 PHARM REV CODE 250: Performed by: PHYSICIAN ASSISTANT

## 2023-07-03 RX ADMIN — Medication 6 MG: at 08:07

## 2023-07-03 RX ADMIN — GABAPENTIN 100 MG: 100 CAPSULE ORAL at 05:07

## 2023-07-03 RX ADMIN — ASPIRIN 325 MG: 325 TABLET, COATED ORAL at 08:07

## 2023-07-03 RX ADMIN — BACLOFEN 10 MG: 10 TABLET ORAL at 08:07

## 2023-07-03 RX ADMIN — FLUOXETINE 20 MG: 20 CAPSULE ORAL at 08:07

## 2023-07-03 RX ADMIN — CLOPIDOGREL BISULFATE 75 MG: 75 TABLET ORAL at 08:07

## 2023-07-03 RX ADMIN — ONDANSETRON 8 MG: 8 TABLET, ORALLY DISINTEGRATING ORAL at 08:07

## 2023-07-03 RX ADMIN — ATORVASTATIN CALCIUM 80 MG: 40 TABLET, FILM COATED ORAL at 08:07

## 2023-07-03 RX ADMIN — MONTELUKAST 10 MG: 10 TABLET, FILM COATED ORAL at 08:07

## 2023-07-03 NOTE — CONSULTS
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 Patient was admitted 7/1 for AMS and facial fasciculations concerning for AIS vs. Seizure and states that she has had 1 week of intermittent confusion and memory difficulties prior to her presentation. She denies new fever/nausea/chills/vomiting, LOC/headache, double vision or changes in vision, phono-/photophobia, new weakness/numbness/tingling, gait instability, bowel/bladder incontinence/retention, saddle anesthesia, neck/back pain, neck stiffness. 10-point reivew of systems otherwise negative. Of note she has baseline RSW/RFD & dysarthria from her previous stroke that is unchanged. She takes  and Plavix 75 QD.     Subjective/Objective  Scheduled Meds:   aspirin  325 mg Oral Daily    atorvastatin  80 mg Oral Daily    baclofen  10 mg Oral BID    clopidogreL  75 mg Oral Daily    FLUoxetine  20 mg Oral QHS    gabapentin  100 mg Oral Daily    montelukast  10 mg Oral QHS      Continuous Infusions:  PRN Meds:acetaminophen, bisacodyL, dextrose 10%, dextrose 10%, glucagon (human recombinant), glucose, glucose, melatonin, ondansetron, polyethylene glycol, promethazine     Review of patient's allergies indicates:  No Known Allergies     Objective:      Vital Signs (Most Recent):  Temp: 97.7 °F (36.5 °C) (07/03/23 1216)  Pulse: 68 (07/03/23 1216)  Resp: 18 (07/03/23 1216)  BP: 112/75 (07/03/23  1216)  SpO2: 98 % (07/03/23 1216) Vital Signs (24h Range):  Temp:  [96.2 °F (35.7 °C)-98.6 °F (37 °C)] 97.7 °F (36.5 °C)  Pulse:  [59-76] 68  Resp:  [14-18] 18  SpO2:  [95 %-98 %] 98 %  BP: (109-119)/(69-75) 112/75      Laboratory:  ABGs: No results for input(s): PH, PCO2, PO2, HCO3, POCSATURATED, BE in the last 48 hours.  BMP:       Recent Labs   Lab 07/03/23  0529         K 4.1      CO2 23   BUN 18   CREATININE 0.8   CALCIUM 9.3   MG 1.7      CBC:       Recent Labs   Lab 07/03/23  0529   WBC 8.26   RBC 4.95   HGB 14.9   HCT 45.5      MCV 92   MCH 30.1   MCHC 32.7      Coagulation: No results for input(s): PT, INR, APTT in the last 48 hours.     Diagnostic Results:  All labs & diagnostics reviewed        Scheduled Meds:   aspirin  325 mg Oral Daily    atorvastatin  80 mg Oral Daily    baclofen  10 mg Oral BID    clopidogreL  75 mg Oral Daily    FLUoxetine  20 mg Oral QHS    gabapentin  100 mg Oral Daily    montelukast  10 mg Oral QHS      Continuous Infusions:  PRN Meds:acetaminophen, bisacodyL, dextrose 10%, dextrose 10%, glucagon (human recombinant), glucose, glucose, melatonin, ondansetron, polyethylene glycol, promethazine     Vital signs in last 24 hours:  Temp:  [96.2 °F (35.7 °C)-98.6 °F (37 °C)] 97.7 °F (36.5 °C)  Pulse:  [59-76] 68  Resp:  [14-18] 18  SpO2:  [95 %-98 %] 98 %  BP: (109-119)/(69-75) 112/75     Intake/Output last 3 shifts:  I/O last 3 completed shifts:  In: -   Out: 900 [Urine:900]  Intake/Output this shift:  No intake/output data recorded.     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:     --Patient admitted to floor on telemetry      -q4h neurochecks on floor  --All labs and diagnostics reviewed      -MRI/MRA 4/13: L M1 & A1 narrowing, no significant collateralization      -MRI  6/29: No acute strokes  --SBP permissive per primary  --Maintain strict euvolemia to (+) 1L fluid status  --Na >135  --Continue DAPT; PRU ordered  --PT/OT/OOB  --ADAT per primary team  --Pain control  --Patient booked/consented for cerebral angiogram on Thursday for re-evaluation of Leslie-Leslie disease  --Continue to monitor clinically, notify NSGY immediately with any changes in neuro status     Dispo: Ongoing

## 2023-07-03 NOTE — ASSESSMENT & PLAN NOTE
Neurology consulted. EEG showed encephalopathy. Facial fasciculations did not correlate with any seizure activity. Will get some type of angiogram. Neuro checks. Delirium precautions. Ears can be examined with an otoscope if her acute symptoms resolve and hearing remains abnormal.

## 2023-07-03 NOTE — ASSESSMENT & PLAN NOTE
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), HLD who [place] [facility] [place] concerning for evolution of her Leslie-Leslie disease:    --Patient admitted to floor on telemetry      -q4h neurochecks on floor  --All labs and diagnostics reviewed      -MRI/MRA 4/13: L M1 & A1 narrowing, no significant collateralization      -MRI 6/29: No acute strokes  --SBP permissive per primary  --Maintain strict euvolemia to (+) 1L fluid status  --Na >135  --Continue DAPT; PRU ordered  --PT/OT/OOB  --ADAT per primary team  --Pain control  --Patient booked/consented for cerebral angiogram on Thursday for re-evaluation of Leslie-Leslie disease  --Continue to monitor clinically, notify NSGY immediately with any changes in neuro status    Dispo: Ongoing

## 2023-07-03 NOTE — ASSESSMENT & PLAN NOTE
"Patient is a 43 year old female hx of Leslie Leslie, chronic FOSTER who presents with altered mental status. Was mowing grass in the heat on Tuesday and has felt fatigued and "off" ever since then. Was noted to have facial twitching by family member. Continues to have rhythmic twitching of her mentalis muscle on exam today. MRI Brain w/o acute changes. MRA from April of this year with focal moderate to severe stenosis in the distal left MCA at the level of the MCA bifurcation with additional stenoses/irregularity extending to the proximal M2 branches. Consider complication of Leslie Leslie versus seizure (underlying structural abnormalities, recent heat exposure, ? Recent GI illness).    EEG done and reviewed with epilepsy staff. No epileptiform activity. Showed mild diffuse encephalopathy. No epileptic component of chin movements.  Possible that chin movements are related to movement d/o as chin movements not occurring during sleep per EEG review. This could correlate w/ Leslie Leslie involvement of basal ganglia.     Recommendations:  -Discussed case w/ Neuro IR- consulted for repeat cerebral angio to evaluate Leslie Leslie    "

## 2023-07-03 NOTE — PROGRESS NOTES
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 Patient was admitted 7/1 for AMS and facial fasciculations concerning for AIS vs. Seizure and states that she has had 1 week of intermittent confusion and memory difficulties prior to her presentation. She denies new fever/nausea/chills/vomiting, LOC/headache, double vision or changes in vision, phono-/photophobia, new weakness/numbness/tingling, gait instability, bowel/bladder incontinence/retention, saddle anesthesia, neck/back pain, neck stiffness. 10-point reivew of systems otherwise negative. Of note she has baseline RSW/RFD & dysarthria from her previous stroke that is unchanged. She takes  and Plavix 75 QD.    Subjective/Objective  Scheduled Meds:   aspirin  325 mg Oral Daily    atorvastatin  80 mg Oral Daily    baclofen  10 mg Oral BID    clopidogreL  75 mg Oral Daily    FLUoxetine  20 mg Oral QHS    gabapentin  100 mg Oral Daily    montelukast  10 mg Oral QHS     Continuous Infusions:  PRN Meds:acetaminophen, bisacodyL, dextrose 10%, dextrose 10%, glucagon (human recombinant), glucose, glucose, melatonin, ondansetron, polyethylene glycol, promethazine    Review of patient's allergies indicates:  No Known Allergies    Objective:     Vital Signs (Most Recent):  Temp: 97.7 °F (36.5 °C) (07/03/23 1216)  Pulse: 68 (07/03/23 1216)  Resp: 18 (07/03/23 1216)  BP: 112/75 (07/03/23 1216)  SpO2: 98 % (07/03/23 1216) Vital Signs (24h Range):  Temp:  [96.2 °F (35.7 °C)-98.6 °F (37 °C)] 97.7 °F (36.5 °C)  Pulse:  [59-76] 68  Resp:  [14-18] 18  SpO2:  [95 %-98 %] 98 %  BP: (109-119)/(69-75) 112/75     Laboratory:  ABGs: No results for input(s): PH, PCO2, PO2, HCO3, POCSATURATED, BE in the last 48 hours.  BMP:   Recent Labs   Lab 07/03/23  0529         K 4.1      CO2 23   BUN 18   CREATININE 0.8   CALCIUM 9.3   MG 1.7      CBC:   Recent Labs   Lab 07/03/23  0529   WBC 8.26   RBC 4.95   HGB 14.9   HCT 45.5      MCV 92   MCH 30.1   MCHC 32.7     Coagulation: No results for input(s): PT, INR, APTT in the last 48 hours.    Diagnostic Results:  All labs & diagnostics reviewed      Scheduled Meds:   aspirin  325 mg Oral Daily    atorvastatin  80 mg Oral Daily    baclofen  10 mg Oral BID    clopidogreL  75 mg Oral Daily    FLUoxetine  20 mg Oral QHS    gabapentin  100 mg Oral Daily    montelukast  10 mg Oral QHS     Continuous Infusions:  PRN Meds:acetaminophen, bisacodyL, dextrose 10%, dextrose 10%, glucagon (human recombinant), glucose, glucose, melatonin, ondansetron, polyethylene glycol, promethazine    Vital signs in last 24 hours:  Temp:  [96.2 °F (35.7 °C)-98.6 °F (37 °C)] 97.7 °F (36.5 °C)  Pulse:  [59-76] 68  Resp:  [14-18] 18  SpO2:  [95 %-98 %] 98 %  BP: (109-119)/(69-75) 112/75    Intake/Output last 3 shifts:  I/O last 3 completed shifts:  In: -   Out: 900 [Urine:900]  Intake/Output this shift:  No intake/output data recorded.    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:    --Patient admitted to floor on telemetry      -q4h neurochecks on floor  --All labs and diagnostics reviewed      -MRI/MRA 4/13: L M1 & A1 narrowing, no significant collateralization      -MRI 6/29: No acute strokes  --SBP permissive per primary  --Maintain strict euvolemia to (+) 1L fluid status  --Na >135  --Continue DAPT; PRU ordered  --PT/OT/OOB  --ADAT per primary team  --Pain control  --Patient booked/consented for cerebral angiogram on Thursday for re-evaluation of Bergman-Bergman disease  --Continue to monitor clinically, notify NSGY immediately with any changes in neuro status    Dispo: Ongoing

## 2023-07-03 NOTE — PLAN OF CARE
Problem: Adult Inpatient Plan of Care  Goal: Optimal Comfort and Wellbeing  Outcome: Ongoing, Progressing  Goal: Readiness for Transition of Care  Outcome: Ongoing, Progressing     Problem: Adult Inpatient Plan of Care  Goal: Optimal Comfort and Wellbeing  Outcome: Ongoing, Progressing     Problem: Adult Inpatient Plan of Care  Goal: Readiness for Transition of Care  Outcome: Ongoing, Progressing     Problem: Infection  Goal: Absence of Infection Signs and Symptoms  Outcome: Ongoing, Progressing     Problem: Infection  Goal: Absence of Infection Signs and Symptoms  Outcome: Ongoing, Progressing     POC reviewed with patient and her sister at the bedside. Verbalization of understanding voiced. Questions and concerns addressed. Precautions maintained. Patient progressing towards goals. No events noted at present. EEG remains in progress. Vital signs all shift. See flow sheet. Bed low and locked with call light within reach. Patients' sister remains at the bedside. POC ongoing.

## 2023-07-03 NOTE — PROCEDURES
VIDEO ELECTROENCEPHALOGRAM  REPORT    DATE OF SERVICE: 7/3/23  EEG NUMBER: [id number]  REQUESTED BY:  Merced Luu PA-C  LOCATION OF SERVICE:  [facility]    METHODOLOGY   Electroencephalographic (EEG) recording is with electrodes placed according to the International 10-20 placement system.  Thirty two (32) channels of digital signal (sampling rate of 512/sec) including T1 and T2 was simultaneously recorded from the scalp and may include  EKG, EMG, and/or eye monitors.  Recording band pass was 0.1 to 512 hz.  Digital video recording of the patient is simultaneously recorded with the EEG.  The patient is instructed report clinical symptoms which may occur during the recording session.  EEG and video recording is stored and archived in digital format.  Activation procedures which include photic stimulation, hyperventilation and instructing patients to perform simple task are done in selected patients.   The EEG is displayed on a monitor screen and can be reviewed using different montages.  Computer assisted analysis is employed to detect spike and electrographic seizure activity.   The entire record is submitted for computer analysis.  The entire recording is visually reviewed and the times identified by computer analysis as being spikes or seizures are reviewed again.  Compresses spectral analysis (CSA) is also performed on the activity recorded from each individual channel.  This is displayed as a power display of frequencies from 0 to 30 Hz over time.   The CSA is reviewed looking for asymmetries in power between homologous areas of the scalp and then compared with the original EEG recording.     [employer] software was also utilized in the review of this study.  This software suite analyzes the EEG recording in multiple domains.  Coherence and rhythmicity is computed to identify EEG sections which may contain organized seizures.  Each channel undergoes analysis to detect presence of spike and sharp  waves which have special and morphological characteristic of epileptic activity.  The routine EEG recording is converted from spacial into frequency domain.  This is then displayed comparing homologous areas to identify areas of significant asymmetry.  Algorithm to identify non-cortically generated artifact is used to separate eye movement, EMG and other artifact from the EEG.      ELECTROENCEPHALOGRAM:    DAY 1:  RECORDING TIMES:  Start on 7/2/23 at 16:16  Stop on 7/3/23 at 07:00  Start on 7/3/23 at 07:00  Stop on 7/3/23 at 16:37    A total of 24 hrs and 21 min of EEG recording was obtained.    Indication: 43 year old female with history of Leslie Leslie disease with encephalopathy as well as abnormal facial twitching.  EEG for spell characterization.      State of Consciousness:   Awake and asleep    Background:   The background is symmetric and continuous, with a normal AP gradient and a PDR that reaches up to 8 hz at maximum alertness.    Sleep:   Transition to stage 2 sleep as well as REM sleep is noted, with normal appearing sleep architecture.    Non-epileptiform Abnormalities:  Intermittent slow, generalized: occasional intermittent generalized delta slowing is present, sometimes rhythmic in appearance.        Epileptiform Abnormalities:   None    EKG:   Regular rate and rhythm on single lead EKG    Activating procedures:   - Hyperventilation/photic stimulation not performed  - Patient noted to be alert, oriented to date, location, next holiday at start of record.    Events:   Multiple push button events for episodes of rhythmic appearing chin twitching (appears to be equal involvement of bilateral mentalis muscles).  Per clinical team report, the patient is not aware of this twitching and it does not appear to be distractible. This is seen frequently throughout the record.  However, these movements are not clearly seen during sleep.    Chin movements - no eeg correlate        Impression:   Abnormal awake and  sleep EEG due to the appearance of intermittent generalized slowing.     Clinical interpretation:  This awake and sleep EEG is consistent with a mild diffuse encephalopathy.  The patient has frequent clinical events of rhythmic chin movements which are not associated with epileptic activity on the EEG.  These events are favored to be a type of movement disorder (which is sub cortically mediated and thus not visible on EEG) rather than an epileptic/cortical process.  However, some forms of EPC (epilepsia partialis continua) can present without EEG correlate when it involves a small area of cortex (too small to be seen through skull thickness).  Typically, EPC is unilateral in nature (unilateral clonic movements, unilateral facial twitching) and often seen with an acute structural lesion involving the motor cortex.  Given the bilateral activation of the mentalis muscles, this is less likely to be EPC.  Additionally, the chin movements are not seen during sleep, while EPC typically continues during sleep.  No epileptiform discharges or lateralizing features are noted during this record.      Dede Rodrigues MD  [facility]   Department of Neurology/Epilepsy

## 2023-07-03 NOTE — PROGRESS NOTES
"[facility] - Neurosurgery ([facility])  Neurology  Progress Note    Patient Name: Ally Ponce  MRN: [id number]  Admission Date: 7/1/2023  Hospital Length of Stay: 0 days  Code Status: Full Code   Attending Provider: Tono Chilel MD  Primary Care Physician: Mariana Mendoza NP   Principal Problem:Acute encephalopathy    HPI:   Ms. Ponce is a 43 year old female pmh of L MCA stroke w/ residual hemiparesis, bergman bergman disease, recurrent falls, and headaches who presents with AMS. Initially presented to OSH on 7/1 and reported approximately 6 days of confusion and memory problems. Patient and friend report that symptoms started on Tuesday of last week. The patient does not recall most of the day Tuesday. Friend states that she mowed two lawns and then became very fatigued and out of it. She was also complaining of an upset stomach, lightheadedness, and dizziness. Denies syncope, vomiting or diarrhea. Since then the patient has not felt like herself. Feels like she is having "brain fog." Has been intermittently agitated and confused. Went to two ERs during this time but has not improved. Does not feel like she has returned to her baseline. She also has been having a headache on the R side of her head (states she hit her head on a car door on Monday) along with tinnitus and decreased hearing on the left. Patient reportedly had facial twitching while at an outside ED. The patient does not recall the twitching and the friend present was not there to see the twitching.  Outside workup was largely wnl. MRI done which showed stable appearing prior stroke along with subcentimeter abnormal signal foci in the bilateral supratentorial white matter. [place] [facility] [place].      Subjective:     Interval History: NAEO. Feeling better today. Less chin twitching. EEG w/o seizures. Pending possible repeat cerebral angiogram w/ IR    Current Neurological Medications: asa/plavix    Current Facility-Administered " Medications   Medication Dose Route Frequency Provider Last Rate Last Admin    acetaminophen tablet 650 mg  650 mg Oral Q4H PRN Merced Luu PA-C        aspirin EC tablet 325 mg  325 mg Oral Daily Merced Luu PA-C   325 mg at 07/03/23 0816    atorvastatin tablet 80 mg  80 mg Oral Daily Merced Luu PA-C   80 mg at 07/03/23 0816    baclofen tablet 10 mg  10 mg Oral BID Merced Luu PA-C   10 mg at 07/03/23 0816    bisacodyL suppository 10 mg  10 mg Rectal Daily PRN Merced Luu PA-C        clopidogreL tablet 75 mg  75 mg Oral Daily Merced Luu PA-C   75 mg at 07/03/23 0816    dextrose 10% bolus 125 mL 125 mL  12.5 g Intravenous PRN Merced Luu PA-C        dextrose 10% bolus 250 mL 250 mL  25 g Intravenous PRN Merced Luu PA-C        FLUoxetine capsule 20 mg  20 mg Oral QHS Mecred Luu PA-C   20 mg at 07/02/23 2137    gabapentin capsule 100 mg  100 mg Oral Daily Merced Luu PA-C   100 mg at 07/02/23 1714    glucagon (human recombinant) injection 1 mg  1 mg Intramuscular PRN Merced Luu PA-C        glucose chewable tablet 16 g  16 g Oral PRN Merced Luu PA-C        glucose chewable tablet 24 g  24 g Oral PRN Merced Luu PA-C        melatonin tablet 6 mg  6 mg Oral Nightly PRN Merced Luu PA-C   6 mg at 07/02/23 2137    montelukast tablet 10 mg  10 mg Oral QHS Merced Luu PA-C   10 mg at 07/02/23 2137    ondansetron disintegrating tablet 8 mg  8 mg Oral Q8H PRN Merced Luu PA-C   8 mg at 07/03/23 0845    polyethylene glycol packet 17 g  17 g Oral Daily PRN Merced Luu PA-C        promethazine tablet 25 mg  25 mg Oral Q6H PRN Merced Luu PA-C           Review of Systems   Constitutional:  Positive for fatigue. Negative for chills and fever.   Respiratory:  Negative for cough and shortness of breath.    Cardiovascular:  Negative for chest pain.    Gastrointestinal:  Positive for nausea. Negative for abdominal pain, diarrhea and vomiting.   Genitourinary:  Positive for decreased urine volume. Negative for dysuria.   Musculoskeletal:  Positive for gait problem. Negative for back pain.   Skin:  Negative for color change and rash.   Neurological:  Positive for dizziness, light-headedness and headaches. Negative for syncope.   Psychiatric/Behavioral:  Positive for behavioral problems and confusion.    Objective:     Vital Signs (Most Recent):  Temp: 97.7 °F (36.5 °C) (07/03/23 1216)  Pulse: 68 (07/03/23 1216)  Resp: 18 (07/03/23 1216)  BP: 112/75 (07/03/23 1216)  SpO2: 98 % (07/03/23 1216) Vital Signs (24h Range):  Temp:  [96.2 °F (35.7 °C)-98.6 °F (37 °C)] 97.7 °F (36.5 °C)  Pulse:  [59-76] 68  Resp:  [14-18] 18  SpO2:  [95 %-98 %] 98 %  BP: (109-119)/(69-75) 112/75        There is no height or weight on file to calculate BMI.     Physical Exam  Constitutional:       General: She is not in acute distress.  Pulmonary:      Effort: Pulmonary effort is normal. No respiratory distress.   Abdominal:      General: Abdomen is flat. There is no distension.   Musculoskeletal:         General: No swelling. Normal range of motion.   Neurological:      Mental Status: She is alert and oriented to person, place, and time.      Cranial Nerves: Cranial nerves 2-12 are intact.      Coordination: Finger-Nose-Finger Test normal.      Comments: Rhythmic contraction of chin/mentalis   Psychiatric:         Mood and Affect: Mood normal.         Behavior: Behavior normal.        NEUROLOGICAL EXAMINATION:     MENTAL STATUS   Oriented to person, place, and time.   Attention: normal. Concentration: normal.   Level of consciousness: alert    CRANIAL NERVES   Cranial nerves II through XII intact.     MOTOR EXAM     Strength   Strength 5/5 except as noted.        Strength 4/5 in BLE flexion/extension  5/5 dorsiflexion/plantar flexion       REFLEXES     Reflexes   Reflexes 2+ except as  "noted.     SENSORY EXAM   Light touch normal.     GAIT AND COORDINATION      Coordination   Finger to nose coordination: normal    Tremor   Resting tremor: absent    Significant Labs: CBC:   Recent Labs   Lab 07/02/23  0436 07/03/23  0529   WBC 8.73 8.26   HGB 14.3 14.9   HCT 42.9 45.5    233     CMP:   Recent Labs   Lab 07/02/23  0436 07/03/23  0529    104    141   K 4.0 4.1    106   CO2 22* 23   BUN 11 18   CREATININE 0.8 0.8   CALCIUM 9.3 9.3   MG 1.9 1.7   PROT 7.1 7.0   ALBUMIN 3.8 3.7   BILITOT 0.6 0.5   ALKPHOS 69 71   AST 14 14   ALT 16 19   ANIONGAP 10 12       Significant Imaging: I have reviewed all pertinent imaging results/findings within the past 24 hours.    Assessment and Plan:     * Acute encephalopathy  Patient is a 43 year old female hx of Leslie Leslie, chronic FOSTER who presents with altered mental status. Was mowing grass in the heat on Tuesday and has felt fatigued and "off" ever since then. Was noted to have facial twitching by family member. Continues to have rhythmic twitching of her mentalis muscle on exam today. MRI Brain w/o acute changes. MRA from April of this year with focal moderate to severe stenosis in the distal left MCA at the level of the MCA bifurcation with additional stenoses/irregularity extending to the proximal M2 branches. Consider complication of Leslie Leslie versus seizure (underlying structural abnormalities, recent heat exposure, ? Recent GI illness).    EEG done and reviewed with epilepsy staff. No epileptiform activity. Showed mild diffuse encephalopathy. No epileptic component of chin movements.  Possible that chin movements are related to movement d/o as chin movements not occurring during sleep per EEG review. This could correlate w/ Leslie Leslie involvement of basal ganglia.     Recommendations:  -Discussed case w/ Neuro IR- consulted for repeat cerebral angio to evaluate Leslie Leslie          VTE Risk Mitigation (From admission, onward)         " Ordered     IP VTE LOW RISK PATIENT  Once         07/01/23 2246     Place sequential compression device  Until discontinued         07/01/23 2246                Aashish Ordonez MD  Neurology  [facility] - Neurosurgery ([facility])

## 2023-07-03 NOTE — HPI
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), HLD who presents to [facility] with new symptoms concerning for evolution of her Leslie-Leslie disease. Patient was admitted 7/1 for AMS and facial fasciculations concerning for AIS vs. Seizure and states that she has had 1 week of intermittent confusion and memory difficulties prior to her presentation. She denies new fever/nausea/chills/vomiting, LOC/headache, double vision or changes in vision, phono-/photophobia, new weakness/numbness/tingling, gait instability, bowel/bladder incontinence/retention, saddle anesthesia, neck/back pain, neck stiffness. 10-point reivew of systems otherwise negative. Of note she has baseline RSW/RFD & dysarthria from her previous stroke that is unchanged. She takes  and Plavix 75 QD.

## 2023-07-03 NOTE — HOSPITAL COURSE
EEG showed mild diffuse encephalopathy. She had rhythmic chin movements which were not associated with epileptic activity on EEG. She reported that she had been having decreased hearing that went along with her other recent symptoms but her ears were not examined with an otoscope during her recent emergency department visits. She was scheduled for cerebral angiogram by Interventional Radiology on 7/6/2023.     Interval History:  7/6- /66 VSS. 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 NPO. No new lab.   - Severe numbness right hand, able to lift knees off bed, difficulty w dorsiflexion of the feet    7/7 - cardiac diet, chat w neurology about AEDs.   Cerebral angiogram: Prior described left distal M1/proximal M2 segment stenosis was noted and characterized - appears stable in severity compared to prior / with corresponding delayed rate of perfusion in the posterior frontal / parietal MCA territories - with collateralization through right A1-Acom-left PENELOPE complex.Posterior circulation appears diminutive in caliber compared to standard norms - however stable in pattern compared to prior angio.   - Keppra started   - pt afraid to get out of bed and family wants PT eval. PT/OT consulted, nurse to assist up to chair.     7/8- walked down halls w PT this am. Will dc to home.

## 2023-07-03 NOTE — SUBJECTIVE & OBJECTIVE
Subjective:     Interval History: NAEO. Feeling better today. Less chin twitching. EEG w/o seizures. Pending possible repeat cerebral angiogram w/ IR    Current Neurological Medications: asa/plavix    Current Facility-Administered Medications   Medication Dose Route Frequency Provider Last Rate Last Admin    acetaminophen tablet 650 mg  650 mg Oral Q4H PRN Merced Luu PA-C        aspirin EC tablet 325 mg  325 mg Oral Daily Merced Luu PA-C   325 mg at 07/03/23 0816    atorvastatin tablet 80 mg  80 mg Oral Daily MELVIN HawthorneC   80 mg at 07/03/23 0816    baclofen tablet 10 mg  10 mg Oral BID Merced Luu PA-C   10 mg at 07/03/23 0816    bisacodyL suppository 10 mg  10 mg Rectal Daily PRN Merced Luu PA-C        clopidogreL tablet 75 mg  75 mg Oral Daily Merced Luu PA-C   75 mg at 07/03/23 0816    dextrose 10% bolus 125 mL 125 mL  12.5 g Intravenous PRN Merced Luu PA-C        dextrose 10% bolus 250 mL 250 mL  25 g Intravenous PRN Merced Luu PA-C        FLUoxetine capsule 20 mg  20 mg Oral QHS Merced Luu PA-C   20 mg at 07/02/23 2137    gabapentin capsule 100 mg  100 mg Oral Daily Merced Luu PA-C   100 mg at 07/02/23 1714    glucagon (human recombinant) injection 1 mg  1 mg Intramuscular PRN Merced Luu PA-C        glucose chewable tablet 16 g  16 g Oral PRN Merced Luu PA-C        glucose chewable tablet 24 g  24 g Oral PRN Merced Luu PA-C        melatonin tablet 6 mg  6 mg Oral Nightly PRN Merced Luu PA-C   6 mg at 07/02/23 2137    montelukast tablet 10 mg  10 mg Oral QHS Merced Luu PA-C   10 mg at 07/02/23 2137    ondansetron disintegrating tablet 8 mg  8 mg Oral Q8H PRN Merced Luu PA-C   8 mg at 07/03/23 0845    polyethylene glycol packet 17 g  17 g Oral Daily PRN Merced Luu PA-C        promethazine tablet 25 mg  25 mg Oral Q6H PRN Merced Luu,  ABRAM           Review of Systems   Constitutional:  Positive for fatigue. Negative for chills and fever.   Respiratory:  Negative for cough and shortness of breath.    Cardiovascular:  Negative for chest pain.   Gastrointestinal:  Positive for nausea. Negative for abdominal pain, diarrhea and vomiting.   Genitourinary:  Positive for decreased urine volume. Negative for dysuria.   Musculoskeletal:  Positive for gait problem. Negative for back pain.   Skin:  Negative for color change and rash.   Neurological:  Positive for dizziness, light-headedness and headaches. Negative for syncope.   Psychiatric/Behavioral:  Positive for behavioral problems and confusion.    Objective:     Vital Signs (Most Recent):  Temp: 97.7 °F (36.5 °C) (07/03/23 1216)  Pulse: 68 (07/03/23 1216)  Resp: 18 (07/03/23 1216)  BP: 112/75 (07/03/23 1216)  SpO2: 98 % (07/03/23 1216) Vital Signs (24h Range):  Temp:  [96.2 °F (35.7 °C)-98.6 °F (37 °C)] 97.7 °F (36.5 °C)  Pulse:  [59-76] 68  Resp:  [14-18] 18  SpO2:  [95 %-98 %] 98 %  BP: (109-119)/(69-75) 112/75        There is no height or weight on file to calculate BMI.     Physical Exam  Constitutional:       General: She is not in acute distress.  Pulmonary:      Effort: Pulmonary effort is normal. No respiratory distress.   Abdominal:      General: Abdomen is flat. There is no distension.   Musculoskeletal:         General: No swelling. Normal range of motion.   Neurological:      Mental Status: She is alert and oriented to person, place, and time.      Cranial Nerves: Cranial nerves 2-12 are intact.      Coordination: Finger-Nose-Finger Test normal.      Comments: Rhythmic contraction of chin/mentalis   Psychiatric:         Mood and Affect: Mood normal.         Behavior: Behavior normal.        NEUROLOGICAL EXAMINATION:     MENTAL STATUS   Oriented to person, place, and time.   Attention: normal. Concentration: normal.   Level of consciousness: alert    CRANIAL NERVES   Cranial nerves II  through XII intact.     MOTOR EXAM     Strength   Strength 5/5 except as noted.        Strength 4/5 in BLE flexion/extension  5/5 dorsiflexion/plantar flexion       REFLEXES     Reflexes   Reflexes 2+ except as noted.     SENSORY EXAM   Light touch normal.     GAIT AND COORDINATION      Coordination   Finger to nose coordination: normal    Tremor   Resting tremor: absent    Significant Labs: CBC:   Recent Labs   Lab 07/02/23  0436 07/03/23  0529   WBC 8.73 8.26   HGB 14.3 14.9   HCT 42.9 45.5    233     CMP:   Recent Labs   Lab 07/02/23  0436 07/03/23  0529    104    141   K 4.0 4.1    106   CO2 22* 23   BUN 11 18   CREATININE 0.8 0.8   CALCIUM 9.3 9.3   MG 1.9 1.7   PROT 7.1 7.0   ALBUMIN 3.8 3.7   BILITOT 0.6 0.5   ALKPHOS 69 71   AST 14 14   ALT 16 19   ANIONGAP 10 12       Significant Imaging: I have reviewed all pertinent imaging results/findings within the past 24 hours.

## 2023-07-03 NOTE — PROGRESS NOTES
[facility] - Neurosurgery ([facility])  [facility] Medicine  Progress Note    Patient Name: Ally Ponce  MRN: [id number]  Patient Class: IP- Inpatient   Admission Date: 7/1/2023  Length of Stay: 0 days  Attending Physician: Tono Chilel MD  Primary Care Provider: Mariana Mendoza NP        Subjective:     Principal Problem:Acute encephalopathy        HPI:  Ally Ponce is a 43 year old white woman with Bergman bergman disease, history of left basal ganglia stroke on 7/27/2019 with right hemiparesis, spasticity, dysarthria, unsteady gait, chronic right-sided headaches, hyperlipidemia. She lives in [place], [place]. She is legally . Her neurologist is Dr. Caridad Benjamin.   She was seen at [facility] Department on 6/28/2023 for slurred speech and mild headaches since 6/25/2023 and intermittent confusion, memory loss, facial twitching, and worsening right lower extremity weakness causing dragging of her right leg since 6/26/2023. Brain MRI showed nonspecific subcentimeter abnormal signal foci in the bilateral supratentorial white matter with a differential of microvascular ischemic changes, focal gliosis, migraine headaches, demyelinating processes, Lyme disease, and vasculitis.    Symptoms persisted. Her sister informed Dr. Benjamin's clinic on 6/29/2023.    She was admitted to [facility] on 7/1/2023 with persistent symptoms, as well as facial fasciculations. On physical examination, she had intermittent rhythmic contractions of the chin and fasciculations of the left lower eyelid with nystagmus. She was awake and interactive while this happened. She had no visual field deficit. She had stable chronic dysarthria. Head CT showed nothing acute. Neurology was contacted at [facility] and transfer was recommended for Neurology evaluation and one possible continuous electroencephalogram. Upon arrival to [facility], she was  admitted to Hospital Medicine Team N. Symptoms had reportedly improved since she was admitted to [facility], but she continued to have delayed speech and it took her longer to think of what she wanted to say. Her dysarthria and right hemiparesis were at baseline.       Overview/Hospital Course:  EEG showed mild diffuse encephalopathy. She had rhythmic chin movements which were not associated with epileptic activity on EEG. She reported that she had been having decreased hearing that went along with her other recent symptoms but her ears were not examined with an otoscope during her recent emergency department visits.       Interval History: Neurology suspects her acute presentation is related to her Bergman bergman disease and plans an angiogram.    Review of Systems   Constitutional:  Negative for chills and fever.   HENT:  Positive for hearing loss.    Neurological:  Positive for weakness and headaches. Negative for syncope.   Objective:     Vital Signs (Most Recent):  Temp: 97.7 °F (36.5 °C) (07/03/23 1216)  Pulse: 68 (07/03/23 1216)  Resp: 18 (07/03/23 1216)  BP: 112/75 (07/03/23 1216)  SpO2: 98 % (07/03/23 1216) Vital Signs (24h Range):  Temp:  [96.2 °F (35.7 °C)-98.6 °F (37 °C)] 97.7 °F (36.5 °C)  Pulse:  [59-76] 68  Resp:  [14-18] 18  SpO2:  [95 %-98 %] 98 %  BP: (109-119)/(69-75) 112/75        There is no height or weight on file to calculate BMI.    Intake/Output Summary (Last 24 hours) at 7/3/2023 1332  Last data filed at 7/2/2023 1627  Gross per 24 hour   Intake --   Output 900 ml   Net -900 ml         Physical Exam  Vitals and nursing note reviewed.   Constitutional:       General: She is not in acute distress.     Appearance: She is well-developed. She is not toxic-appearing or diaphoretic.   Pulmonary:      Effort: Pulmonary effort is normal. No respiratory distress.   Skin:     General: Skin is warm and dry.      Coloration: Skin is not jaundiced or pale.   Neurological:      Mental Status: She  is alert.      Cranial Nerves: Dysarthria present.      Motor: Weakness present. No seizure activity.   Psychiatric:         Attention and Perception: Attention normal.         Mood and Affect: Affect normal.         Behavior: Behavior is not agitated or aggressive.           Significant Labs: All pertinent labs within the past 24 hours have been reviewed.    Significant Imaging: I have reviewed all pertinent imaging results/findings within the past 24 hours.      Assessment/Plan:      * Acute encephalopathy  Neurology consulted. EEG showed encephalopathy. Facial fasciculations did not correlate with any seizure activity. Will get some type of angiogram. Neuro checks. Delirium precautions. Ears can be examined with an otoscope if her acute symptoms resolve and hearing remains abnormal.     Hemiparesis of right dominant side as late effect of cerebral infarction  History of ischemic left MCA stroke  Bergman bergman disease  Chronic. Continue home aspirin, clopidrogel, and atorvastatin.    Spasticity  Chronic. Stable. Continue home baclofen.      VTE Risk Mitigation (From admission, onward)         Ordered     IP VTE LOW RISK PATIENT  Once         07/01/23 2246     Place sequential compression device  Until discontinued         07/01/23 2246                Discharge Planning   BLAS:      Code Status: Full Code   Is the patient medically ready for discharge?: No    Reason for patient still in hospital (select all that apply): Patient unstable, Patient trending condition and Consult recommendations  Discharge Plan A: Home with family                  Tono Chilel MD  Department of Hospital Medicine   [facility] - Neurosurgery ([facility])

## 2023-07-03 NOTE — SUBJECTIVE & OBJECTIVE
Interval History: Neurology suspects her acute presentation is related to her Bergman bergman disease and plans an angiogram.    Review of Systems   Constitutional:  Negative for chills and fever.   HENT:  Positive for hearing loss.    Neurological:  Positive for weakness and headaches. Negative for syncope.   Objective:     Vital Signs (Most Recent):  Temp: 97.7 °F (36.5 °C) (07/03/23 1216)  Pulse: 68 (07/03/23 1216)  Resp: 18 (07/03/23 1216)  BP: 112/75 (07/03/23 1216)  SpO2: 98 % (07/03/23 1216) Vital Signs (24h Range):  Temp:  [96.2 °F (35.7 °C)-98.6 °F (37 °C)] 97.7 °F (36.5 °C)  Pulse:  [59-76] 68  Resp:  [14-18] 18  SpO2:  [95 %-98 %] 98 %  BP: (109-119)/(69-75) 112/75        There is no height or weight on file to calculate BMI.    Intake/Output Summary (Last 24 hours) at 7/3/2023 1332  Last data filed at 7/2/2023 1627  Gross per 24 hour   Intake --   Output 900 ml   Net -900 ml         Physical Exam  Vitals and nursing note reviewed.   Constitutional:       General: She is not in acute distress.     Appearance: She is well-developed. She is not toxic-appearing or diaphoretic.   Pulmonary:      Effort: Pulmonary effort is normal. No respiratory distress.   Skin:     General: Skin is warm and dry.      Coloration: Skin is not jaundiced or pale.   Neurological:      Mental Status: She is alert.      Cranial Nerves: Dysarthria present.      Motor: Weakness present. No seizure activity.   Psychiatric:         Attention and Perception: Attention normal.         Mood and Affect: Affect normal.         Behavior: Behavior is not agitated or aggressive.           Significant Labs: All pertinent labs within the past 24 hours have been reviewed.    Significant Imaging: I have reviewed all pertinent imaging results/findings within the past 24 hours.

## 2023-07-03 NOTE — SUBJECTIVE & OBJECTIVE
Scheduled Meds:   aspirin  325 mg Oral Daily    atorvastatin  80 mg Oral Daily    baclofen  10 mg Oral BID    clopidogreL  75 mg Oral Daily    FLUoxetine  20 mg Oral QHS    gabapentin  100 mg Oral Daily    montelukast  10 mg Oral QHS     Continuous Infusions:  PRN Meds:acetaminophen, bisacodyL, dextrose 10%, dextrose 10%, glucagon (human recombinant), glucose, glucose, melatonin, ondansetron, polyethylene glycol, promethazine    Review of patient's allergies indicates:  No Known Allergies    Objective:     Vital Signs (Most Recent):  Temp: 97.7 °F (36.5 °C) (07/03/23 1216)  Pulse: 68 (07/03/23 1216)  Resp: 18 (07/03/23 1216)  BP: 112/75 (07/03/23 1216)  SpO2: 98 % (07/03/23 1216) Vital Signs (24h Range):  Temp:  [96.2 °F (35.7 °C)-98.6 °F (37 °C)] 97.7 °F (36.5 °C)  Pulse:  [59-76] 68  Resp:  [14-18] 18  SpO2:  [95 %-98 %] 98 %  BP: (109-119)/(69-75) 112/75     Laboratory:  ABGs: No results for input(s): PH, PCO2, PO2, HCO3, POCSATURATED, BE in the last 48 hours.  BMP:   Recent Labs   Lab 07/03/23  0529         K 4.1      CO2 23   BUN 18   CREATININE 0.8   CALCIUM 9.3   MG 1.7     CBC:   Recent Labs   Lab 07/03/23  0529   WBC 8.26   RBC 4.95   HGB 14.9   HCT 45.5      MCV 92   MCH 30.1   MCHC 32.7     Coagulation: No results for input(s): PT, INR, APTT in the last 48 hours.    Diagnostic Results:  All labs & diagnostics reviewed

## 2023-07-04 LAB
ALBUMIN SERPL BCP-MCNC: 3.8 G/DL (ref 3.5–5.2)
ALP SERPL-CCNC: 75 U/L (ref 55–135)
ALT SERPL W/O P-5'-P-CCNC: 20 U/L (ref 10–44)
ANION GAP SERPL CALC-SCNC: 8 MMOL/L (ref 8–16)
AST SERPL-CCNC: 18 U/L (ref 10–40)
BASOPHILS # BLD AUTO: 0.07 K/UL (ref 0–0.2)
BASOPHILS NFR BLD: 0.9 % (ref 0–1.9)
BILIRUB SERPL-MCNC: 0.5 MG/DL (ref 0.1–1)
BUN SERPL-MCNC: 14 MG/DL (ref 6–20)
CALCIUM SERPL-MCNC: 9.3 MG/DL (ref 8.7–10.5)
CHLORIDE SERPL-SCNC: 105 MMOL/L (ref 95–110)
CO2 SERPL-SCNC: 26 MMOL/L (ref 23–29)
CREAT SERPL-MCNC: 0.8 MG/DL (ref 0.5–1.4)
DIFFERENTIAL METHOD: NORMAL
EOSINOPHIL # BLD AUTO: 0.2 K/UL (ref 0–0.5)
EOSINOPHIL NFR BLD: 2.5 % (ref 0–8)
ERYTHROCYTE [DISTWIDTH] IN BLOOD BY AUTOMATED COUNT: 12.8 % (ref 11.5–14.5)
EST. GFR  (NO RACE VARIABLE): >60 ML/MIN/1.73 M^2
GLUCOSE SERPL-MCNC: 100 MG/DL (ref 70–110)
HCT VFR BLD AUTO: 46.3 % (ref 37–48.5)
HGB BLD-MCNC: 15.1 G/DL (ref 12–16)
IMM GRANULOCYTES # BLD AUTO: 0.02 K/UL (ref 0–0.04)
IMM GRANULOCYTES NFR BLD AUTO: 0.3 % (ref 0–0.5)
LYMPHOCYTES # BLD AUTO: 2.3 K/UL (ref 1–4.8)
LYMPHOCYTES NFR BLD: 29.9 % (ref 18–48)
MAGNESIUM SERPL-MCNC: 1.8 MG/DL (ref 1.6–2.6)
MCH RBC QN AUTO: 30 PG (ref 27–31)
MCHC RBC AUTO-ENTMCNC: 32.6 G/DL (ref 32–36)
MCV RBC AUTO: 92 FL (ref 82–98)
MONOCYTES # BLD AUTO: 0.7 K/UL (ref 0.3–1)
MONOCYTES NFR BLD: 8.6 % (ref 4–15)
NEUTROPHILS # BLD AUTO: 4.4 K/UL (ref 1.8–7.7)
NEUTROPHILS NFR BLD: 57.8 % (ref 38–73)
NRBC BLD-RTO: 0 /100 WBC
PHOSPHATE SERPL-MCNC: 3.5 MG/DL (ref 2.7–4.5)
PLATELET # BLD AUTO: 242 K/UL (ref 150–450)
PMV BLD AUTO: 10.6 FL (ref 9.2–12.9)
POTASSIUM SERPL-SCNC: 4.5 MMOL/L (ref 3.5–5.1)
PROT SERPL-MCNC: 7.3 G/DL (ref 6–8.4)
RBC # BLD AUTO: 5.03 M/UL (ref 4–5.4)
SODIUM SERPL-SCNC: 139 MMOL/L (ref 136–145)
WBC # BLD AUTO: 7.57 K/UL (ref 3.9–12.7)

## 2023-07-04 PROCEDURE — 99233 PR SUBSEQUENT HOSPITAL CARE,LEVL III: ICD-10-PCS | Mod: ,,, | Performed by: HOSPITALIST

## 2023-07-04 PROCEDURE — 11000001 HC ACUTE MED/SURG PRIVATE ROOM

## 2023-07-04 PROCEDURE — 36415 COLL VENOUS BLD VENIPUNCTURE: CPT | Performed by: PHYSICIAN ASSISTANT

## 2023-07-04 PROCEDURE — 99233 SBSQ HOSP IP/OBS HIGH 50: CPT | Mod: ,,, | Performed by: HOSPITALIST

## 2023-07-04 PROCEDURE — 25000003 PHARM REV CODE 250: Performed by: PHYSICIAN ASSISTANT

## 2023-07-04 PROCEDURE — 83735 ASSAY OF MAGNESIUM: CPT | Performed by: PHYSICIAN ASSISTANT

## 2023-07-04 PROCEDURE — 80053 COMPREHEN METABOLIC PANEL: CPT | Performed by: PHYSICIAN ASSISTANT

## 2023-07-04 PROCEDURE — 84100 ASSAY OF PHOSPHORUS: CPT | Performed by: PHYSICIAN ASSISTANT

## 2023-07-04 PROCEDURE — 85025 COMPLETE CBC W/AUTO DIFF WBC: CPT | Performed by: PHYSICIAN ASSISTANT

## 2023-07-04 RX ADMIN — GABAPENTIN 100 MG: 100 CAPSULE ORAL at 05:07

## 2023-07-04 RX ADMIN — MONTELUKAST 10 MG: 10 TABLET, FILM COATED ORAL at 08:07

## 2023-07-04 RX ADMIN — BACLOFEN 10 MG: 10 TABLET ORAL at 08:07

## 2023-07-04 RX ADMIN — ASPIRIN 325 MG: 325 TABLET, COATED ORAL at 08:07

## 2023-07-04 RX ADMIN — ATORVASTATIN CALCIUM 80 MG: 40 TABLET, FILM COATED ORAL at 08:07

## 2023-07-04 RX ADMIN — Medication 6 MG: at 09:07

## 2023-07-04 RX ADMIN — ONDANSETRON 8 MG: 8 TABLET, ORALLY DISINTEGRATING ORAL at 09:07

## 2023-07-04 RX ADMIN — CLOPIDOGREL BISULFATE 75 MG: 75 TABLET ORAL at 08:07

## 2023-07-04 RX ADMIN — FLUOXETINE 20 MG: 20 CAPSULE ORAL at 08:07

## 2023-07-04 NOTE — SUBJECTIVE & OBJECTIVE
Interval History: Will get cerebral angiogram on Thursday.    Review of Systems   Constitutional:  Negative for chills and fever.   HENT:  Positive for hearing loss.    Neurological:  Positive for weakness and headaches. Negative for syncope.   Objective:     Vital Signs (Most Recent):  Temp: 97.6 °F (36.4 °C) (07/04/23 0813)  Pulse: 64 (07/04/23 0813)  Resp: 18 (07/04/23 0813)  BP: 112/67 (07/04/23 0813)  SpO2: 97 % (07/04/23 0813) Vital Signs (24h Range):  Temp:  [97.6 °F (36.4 °C)-99.3 °F (37.4 °C)] 97.6 °F (36.4 °C)  Pulse:  [56-68] 64  Resp:  [16-18] 18  SpO2:  [97 %-99 %] 97 %  BP: (112-119)/(67-75) 112/67        There is no height or weight on file to calculate BMI.    Intake/Output Summary (Last 24 hours) at 7/4/2023 1011  Last data filed at 7/4/2023 0502  Gross per 24 hour   Intake --   Output 1150 ml   Net -1150 ml           Physical Exam  Vitals and nursing note reviewed.   Constitutional:       General: She is not in acute distress.     Appearance: She is well-developed. She is not toxic-appearing or diaphoretic.   Pulmonary:      Effort: Pulmonary effort is normal. No respiratory distress.   Skin:     General: Skin is warm and dry.      Coloration: Skin is not jaundiced or pale.   Neurological:      Mental Status: She is alert.      Cranial Nerves: Dysarthria present.      Motor: Weakness present. No seizure activity.   Psychiatric:         Attention and Perception: Attention normal.         Mood and Affect: Affect normal.         Behavior: Behavior is not agitated or aggressive.           Significant Labs: All pertinent labs within the past 24 hours have been reviewed.    Significant Imaging: I have reviewed all pertinent imaging results/findings within the past 24 hours.

## 2023-07-04 NOTE — ASSESSMENT & PLAN NOTE
Neurology following. EEG showed encephalopathy. Facial fasciculations did not correlate with any seizure activity. Will get cerebral angiogram on 7/6/2023. Neuro checks. Delirium precautions. Ears can be examined with an otoscope if her acute symptoms resolve and hearing remains abnormal.

## 2023-07-04 NOTE — PROGRESS NOTES
[facility] - Neurosurgery ([facility])  [facility] Medicine  Progress Note    Patient Name: Ally Ponce  MRN: [id number]  Patient Class: IP- Inpatient   Admission Date: 7/1/2023  Length of Stay: 1 days  Attending Physician: Tono Chilel MD  Primary Care Provider: Mariana Mendoza NP        Subjective:     Principal Problem:Acute encephalopathy        HPI:  Ally Ponce is a 43 year old white woman with Bergman bergman disease, history of left basal ganglia stroke on 7/27/2019 with right hemiparesis, spasticity, dysarthria, unsteady gait, chronic right-sided headaches, hyperlipidemia. She lives in [place], [place]. She is legally . Her neurologist is Dr. Caridad Benjamin.   She was seen at [facility] Emergency Department on 6/28/2023 for slurred speech and mild headaches since 6/25/2023 and intermittent confusion, memory loss, facial twitching, and worsening right lower extremity weakness causing dragging of her right leg since 6/26/2023. Brain MRI showed nonspecific subcentimeter abnormal signal foci in the bilateral supratentorial white matter with a differential of microvascular ischemic changes, focal gliosis, migraine headaches, demyelinating processes, Lyme disease, and vasculitis.    Symptoms persisted. Her sister informed Dr. Benjamin's clinic on 6/29/2023.    She was admitted to [facility] on 7/1/2023 with persistent symptoms, as well as facial fasciculations. On physical examination, she had intermittent rhythmic contractions of the chin and fasciculations of the left lower eyelid with nystagmus. She was awake and interactive while this happened. She had no visual field deficit. She had stable chronic dysarthria. Head CT showed nothing acute. Neurology was contacted at [facility] and transfer was recommended for Neurology evaluation and one possible continuous electroencephalogram. Upon arrival to [facility], she was  admitted to Hospital Medicine Team N. Symptoms had reportedly improved since she was admitted to [facility], but she continued to have delayed speech and it took her longer to think of what she wanted to say. Her dysarthria and right hemiparesis were at baseline.       Overview/Hospital Course:  EEG showed mild diffuse encephalopathy. She had rhythmic chin movements which were not associated with epileptic activity on EEG. She reported that she had been having decreased hearing that went along with her other recent symptoms but her ears were not examined with an otoscope during her recent emergency department visits. She was scheduled for cerebral angiogram by Interventional Radiology on 7/6/2023.       Interval History: Will get cerebral angiogram on Thursday.    Review of Systems   Constitutional:  Negative for chills and fever.   HENT:  Positive for hearing loss.    Neurological:  Positive for weakness and headaches. Negative for syncope.   Objective:     Vital Signs (Most Recent):  Temp: 97.6 °F (36.4 °C) (07/04/23 0813)  Pulse: 64 (07/04/23 0813)  Resp: 18 (07/04/23 0813)  BP: 112/67 (07/04/23 0813)  SpO2: 97 % (07/04/23 0813) Vital Signs (24h Range):  Temp:  [97.6 °F (36.4 °C)-99.3 °F (37.4 °C)] 97.6 °F (36.4 °C)  Pulse:  [56-68] 64  Resp:  [16-18] 18  SpO2:  [97 %-99 %] 97 %  BP: (112-119)/(67-75) 112/67        There is no height or weight on file to calculate BMI.    Intake/Output Summary (Last 24 hours) at 7/4/2023 1011  Last data filed at 7/4/2023 0502  Gross per 24 hour   Intake --   Output 1150 ml   Net -1150 ml           Physical Exam  Vitals and nursing note reviewed.   Constitutional:       General: She is not in acute distress.     Appearance: She is well-developed. She is not toxic-appearing or diaphoretic.   Pulmonary:      Effort: Pulmonary effort is normal. No respiratory distress.   Skin:     General: Skin is warm and dry.      Coloration: Skin is not jaundiced or pale.    Neurological:      Mental Status: She is alert.      Cranial Nerves: Dysarthria present.      Motor: Weakness present. No seizure activity.   Psychiatric:         Attention and Perception: Attention normal.         Mood and Affect: Affect normal.         Behavior: Behavior is not agitated or aggressive.           Significant Labs: All pertinent labs within the past 24 hours have been reviewed.    Significant Imaging: I have reviewed all pertinent imaging results/findings within the past 24 hours.      Assessment/Plan:      * Acute encephalopathy  Neurology following. EEG showed encephalopathy. Facial fasciculations did not correlate with any seizure activity. Will get cerebral angiogram on 7/6/2023. Neuro checks. Delirium precautions. Ears can be examined with an otoscope if her acute symptoms resolve and hearing remains abnormal.     Hemiparesis of right dominant side as late effect of cerebral infarction  History of ischemic left MCA stroke  Bergman bergman disease  Chronic. Continue home aspirin, clopidrogel, and atorvastatin.    Spasticity  Chronic. Stable. Continue home baclofen.      VTE Risk Mitigation (From admission, onward)         Ordered     IP VTE LOW RISK PATIENT  Once         07/01/23 2246     Place sequential compression device  Until discontinued         07/01/23 2246                Discharge Planning   BLAS:      Code Status: Full Code   Is the patient medically ready for discharge?: No    Reason for patient still in hospital (select all that apply): Patient trending condition, Treatment and Consult recommendations  Discharge Plan A: Home with family                  Tono Chilel MD  Department of Hospital Medicine   [facility] - Neurosurgery ([facility])

## 2023-07-04 NOTE — PROGRESS NOTES
[facility]  Neurology    No acute or concerning events noted by overnight staff. Vital signs are stable and within normal limits. Patient is conscious and comfortable. Patient reports memory has improved.    Neurological exam stable.     Labs/micro/imaging reviewed.    Pending diagnostics to follow up on include: Angiogram    There are no new recommendations at this time.  For other recommendations please see our previous note completed on: 07/03/2023.  Discussed patient with staff.   Neurology will follow-up with patient. Please contact Neurology for any questions or concerns.       Chris Tierney MD  Neurology  [facility] [place], [place]

## 2023-07-04 NOTE — PLAN OF CARE
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

## 2023-07-05 PROCEDURE — 11000001 HC ACUTE MED/SURG PRIVATE ROOM

## 2023-07-05 PROCEDURE — 99232 PR SUBSEQUENT HOSPITAL CARE,LEVL II: ICD-10-PCS | Mod: ,,, | Performed by: HOSPITALIST

## 2023-07-05 PROCEDURE — 99232 SBSQ HOSP IP/OBS MODERATE 35: CPT | Mod: ,,, | Performed by: HOSPITALIST

## 2023-07-05 PROCEDURE — 25000003 PHARM REV CODE 250: Performed by: PHYSICIAN ASSISTANT

## 2023-07-05 RX ADMIN — ATORVASTATIN CALCIUM 80 MG: 40 TABLET, FILM COATED ORAL at 08:07

## 2023-07-05 RX ADMIN — BACLOFEN 10 MG: 10 TABLET ORAL at 08:07

## 2023-07-05 RX ADMIN — CLOPIDOGREL BISULFATE 75 MG: 75 TABLET ORAL at 08:07

## 2023-07-05 RX ADMIN — GABAPENTIN 100 MG: 100 CAPSULE ORAL at 05:07

## 2023-07-05 RX ADMIN — FLUOXETINE 20 MG: 20 CAPSULE ORAL at 09:07

## 2023-07-05 RX ADMIN — BACLOFEN 10 MG: 10 TABLET ORAL at 09:07

## 2023-07-05 RX ADMIN — ASPIRIN 325 MG: 325 TABLET, COATED ORAL at 08:07

## 2023-07-05 RX ADMIN — Medication 6 MG: at 09:07

## 2023-07-05 RX ADMIN — MONTELUKAST 10 MG: 10 TABLET, FILM COATED ORAL at 09:07

## 2023-07-05 NOTE — PROGRESS NOTES
[facility]  Neurology    No acute or concerning events noted by overnight staff. Vital signs are stable and within normal limits. Patient is conscious and comfortable.      Neurological exam stable.      Labs/micro/imaging reviewed.     Pending diagnostics to follow up on include: Angiogram.     There are no new recommendations at this time.  For other recommendations please see our previous note completed on: 07/03/2023.  Discussed patient with staff.   Neurology will follow-up with patient. Please contact Neurology for any questions or concerns.       Chris Tierney MD  Neurology  [facility] [place], [place]

## 2023-07-05 NOTE — SUBJECTIVE & OBJECTIVE
Interval History: Will get cerebral angiogram on Thursday. NPO after midnight.    Review of Systems   Constitutional:  Negative for chills and fever.   HENT:  Positive for hearing loss.    Neurological:  Positive for weakness and headaches. Negative for syncope.   Objective:     Vital Signs (Most Recent):  Temp: 97.8 °F (36.6 °C) (07/05/23 0723)  Pulse: (!) 59 (07/05/23 0723)  Resp: 18 (07/05/23 0723)  BP: 107/66 (07/05/23 0723)  SpO2: 97 % (07/05/23 0723) Vital Signs (24h Range):  Temp:  [97.8 °F (36.6 °C)-98.4 °F (36.9 °C)] 97.8 °F (36.6 °C)  Pulse:  [59-69] 59  Resp:  [16-18] 18  SpO2:  [96 %-98 %] 97 %  BP: (101-114)/(58-68) 107/66        There is no height or weight on file to calculate BMI.    Intake/Output Summary (Last 24 hours) at 7/5/2023 0955  Last data filed at 7/4/2023 1735  Gross per 24 hour   Intake --   Output 600 ml   Net -600 ml           Physical Exam  Vitals and nursing note reviewed.   Constitutional:       General: She is not in acute distress.     Appearance: She is well-developed. She is not toxic-appearing or diaphoretic.   Pulmonary:      Effort: Pulmonary effort is normal. No respiratory distress.   Neurological:      Mental Status: She is alert.      Cranial Nerves: Dysarthria present.      Motor: Weakness present. No seizure activity.   Psychiatric:         Attention and Perception: Attention normal.         Mood and Affect: Affect normal.         Behavior: Behavior is not agitated or aggressive.           Significant Labs: All pertinent labs within the past 24 hours have been reviewed.    Significant Imaging: I have reviewed all pertinent imaging results/findings within the past 24 hours.

## 2023-07-05 NOTE — PROGRESS NOTES
[facility] - Neurosurgery ([facility])  [facility] Medicine  Progress Note    Patient Name: Ally Ponce  MRN: [id number]  Patient Class: IP- Inpatient   Admission Date: 7/1/2023  Length of Stay: 2 days  Attending Physician: Tono Chilel MD  Primary Care Provider: Mariana Mendoza NP        Subjective:     Principal Problem:Acute encephalopathy        HPI:  Ally Ponce is a 43 year old white woman with Bergman bergman disease, history of left basal ganglia stroke on 7/27/2019 with right hemiparesis, spasticity, dysarthria, unsteady gait, chronic right-sided headaches, hyperlipidemia. She lives in [place], [place]. She is legally . Her neurologist is Dr. Caridad Benjamin.   She was seen at [facility] Emergency Department on 6/28/2023 for slurred speech and mild headaches since 6/25/2023 and intermittent confusion, memory loss, facial twitching, and worsening right lower extremity weakness causing dragging of her right leg since 6/26/2023. Brain MRI showed nonspecific subcentimeter abnormal signal foci in the bilateral supratentorial white matter with a differential of microvascular ischemic changes, focal gliosis, migraine headaches, demyelinating processes, Lyme disease, and vasculitis.    Symptoms persisted. Her sister informed Dr. Benjamin's clinic on 6/29/2023.    She was admitted to [facility] on 7/1/2023 with persistent symptoms, as well as facial fasciculations. On physical examination, she had intermittent rhythmic contractions of the chin and fasciculations of the left lower eyelid with nystagmus. She was awake and interactive while this happened. She had no visual field deficit. She had stable chronic dysarthria. Head CT showed nothing acute. Neurology was contacted at [facility] and transfer was recommended for Neurology evaluation and one possible continuous electroencephalogram. Upon arrival to [facility], she was  admitted to Hospital Medicine Team N. Symptoms had reportedly improved since she was admitted to [facility], but she continued to have delayed speech and it took her longer to think of what she wanted to say. Her dysarthria and right hemiparesis were at baseline.       Overview/Hospital Course:  EEG showed mild diffuse encephalopathy. She had rhythmic chin movements which were not associated with epileptic activity on EEG. She reported that she had been having decreased hearing that went along with her other recent symptoms but her ears were not examined with an otoscope during her recent emergency department visits. She was scheduled for cerebral angiogram by Interventional Radiology on 7/6/2023.       Interval History: Will get cerebral angiogram on Thursday. NPO after midnight.    Review of Systems   Constitutional:  Negative for chills and fever.   HENT:  Positive for hearing loss.    Neurological:  Positive for weakness and headaches. Negative for syncope.   Objective:     Vital Signs (Most Recent):  Temp: 97.8 °F (36.6 °C) (07/05/23 0723)  Pulse: (!) 59 (07/05/23 0723)  Resp: 18 (07/05/23 0723)  BP: 107/66 (07/05/23 0723)  SpO2: 97 % (07/05/23 0723) Vital Signs (24h Range):  Temp:  [97.8 °F (36.6 °C)-98.4 °F (36.9 °C)] 97.8 °F (36.6 °C)  Pulse:  [59-69] 59  Resp:  [16-18] 18  SpO2:  [96 %-98 %] 97 %  BP: (101-114)/(58-68) 107/66        There is no height or weight on file to calculate BMI.    Intake/Output Summary (Last 24 hours) at 7/5/2023 0959  Last data filed at 7/4/2023 1735  Gross per 24 hour   Intake --   Output 600 ml   Net -600 ml           Physical Exam  Vitals and nursing note reviewed.   Constitutional:       General: She is not in acute distress.     Appearance: She is well-developed. She is not toxic-appearing or diaphoretic.   Pulmonary:      Effort: Pulmonary effort is normal. No respiratory distress.   Neurological:      Mental Status: She is alert.      Cranial Nerves: Dysarthria  present.      Motor: Weakness present. No seizure activity.   Psychiatric:         Attention and Perception: Attention normal.         Mood and Affect: Affect normal.         Behavior: Behavior is not agitated or aggressive.           Significant Labs: All pertinent labs within the past 24 hours have been reviewed.    Significant Imaging: I have reviewed all pertinent imaging results/findings within the past 24 hours.      Assessment/Plan:      * Acute encephalopathy  Neurology following. EEG showed encephalopathy. Facial fasciculations did not correlate with any seizure activity. Will get cerebral angiogram on 7/6/2023. Neuro checks. Delirium precautions. Ears can be examined with an otoscope if her acute symptoms resolve and hearing remains abnormal.     Hemiparesis of right dominant side as late effect of cerebral infarction  History of ischemic left MCA stroke  Bergman bergman disease  Chronic. Continue home aspirin, clopidrogel, and atorvastatin.    Spasticity  Chronic. Stable. Continue home baclofen.      VTE Risk Mitigation (From admission, onward)         Ordered     IP VTE LOW RISK PATIENT  Once         07/01/23 2246     Place sequential compression device  Until discontinued         07/01/23 2246                Discharge Planning   BLAS:      Code Status: Full Code   Is the patient medically ready for discharge?: No    Reason for patient still in hospital (select all that apply): Patient trending condition, Consult recommendations and Other (specify) angiogram  Discharge Plan A: Home with family                  Tono Chilel MD  Department of Hospital Medicine   [facility] - Neurosurgery ([facility])

## 2023-07-06 PROCEDURE — 25000003 PHARM REV CODE 250

## 2023-07-06 PROCEDURE — 99233 PR SUBSEQUENT HOSPITAL CARE,LEVL III: ICD-10-PCS | Mod: ,,, | Performed by: PSYCHIATRY & NEUROLOGY

## 2023-07-06 PROCEDURE — 99233 SBSQ HOSP IP/OBS HIGH 50: CPT | Mod: ,,, | Performed by: PSYCHIATRY & NEUROLOGY

## 2023-07-06 PROCEDURE — 25000003 PHARM REV CODE 250: Performed by: PHYSICIAN ASSISTANT

## 2023-07-06 PROCEDURE — 99233 PR SUBSEQUENT HOSPITAL CARE,LEVL III: ICD-10-PCS | Mod: ,,, | Performed by: HOSPITALIST

## 2023-07-06 PROCEDURE — 63600175 PHARM REV CODE 636 W HCPCS: Performed by: STUDENT IN AN ORGANIZED HEALTH CARE EDUCATION/TRAINING PROGRAM

## 2023-07-06 PROCEDURE — 11000001 HC ACUTE MED/SURG PRIVATE ROOM

## 2023-07-06 PROCEDURE — 25500020 PHARM REV CODE 255: Performed by: HOSPITALIST

## 2023-07-06 PROCEDURE — 99233 SBSQ HOSP IP/OBS HIGH 50: CPT | Mod: ,,, | Performed by: HOSPITALIST

## 2023-07-06 PROCEDURE — 25000003 PHARM REV CODE 250: Performed by: STUDENT IN AN ORGANIZED HEALTH CARE EDUCATION/TRAINING PROGRAM

## 2023-07-06 RX ORDER — SODIUM CHLORIDE 0.9 % (FLUSH) 0.9 %
10 SYRINGE (ML) INJECTION
Status: DISCONTINUED | OUTPATIENT
Start: 2023-07-06 | End: 2023-07-08 | Stop reason: HOSPADM

## 2023-07-06 RX ORDER — LIDOCAINE HYDROCHLORIDE 10 MG/ML
INJECTION INFILTRATION; PERINEURAL
Status: COMPLETED | OUTPATIENT
Start: 2023-07-06 | End: 2023-07-06

## 2023-07-06 RX ORDER — MIDAZOLAM HYDROCHLORIDE 1 MG/ML
INJECTION INTRAMUSCULAR; INTRAVENOUS
Status: COMPLETED | OUTPATIENT
Start: 2023-07-06 | End: 2023-07-06

## 2023-07-06 RX ORDER — FENTANYL CITRATE 50 UG/ML
INJECTION, SOLUTION INTRAMUSCULAR; INTRAVENOUS
Status: COMPLETED | OUTPATIENT
Start: 2023-07-06 | End: 2023-07-06

## 2023-07-06 RX ORDER — IODIXANOL 320 MG/ML
85 INJECTION, SOLUTION INTRAVASCULAR
Status: COMPLETED | OUTPATIENT
Start: 2023-07-06 | End: 2023-07-06

## 2023-07-06 RX ORDER — ACETAMINOPHEN 325 MG/1
TABLET ORAL
Status: COMPLETED
Start: 2023-07-06 | End: 2023-07-06

## 2023-07-06 RX ADMIN — ACETAMINOPHEN: 325 TABLET ORAL at 05:07

## 2023-07-06 RX ADMIN — MIDAZOLAM HYDROCHLORIDE 0.5 MG: 1 INJECTION INTRAMUSCULAR; INTRAVENOUS at 04:07

## 2023-07-06 RX ADMIN — FENTANYL CITRATE 25 MCG: 50 INJECTION, SOLUTION INTRAMUSCULAR; INTRAVENOUS at 04:07

## 2023-07-06 RX ADMIN — IODIXANOL 85 ML: 320 INJECTION, SOLUTION INTRAVASCULAR at 04:07

## 2023-07-06 RX ADMIN — MONTELUKAST 10 MG: 10 TABLET, FILM COATED ORAL at 09:07

## 2023-07-06 RX ADMIN — ACETAMINOPHEN 650 MG: 325 TABLET ORAL at 09:07

## 2023-07-06 RX ADMIN — ACETAMINOPHEN 650 MG: 325 TABLET ORAL at 05:07

## 2023-07-06 RX ADMIN — Medication 6 MG: at 11:07

## 2023-07-06 RX ADMIN — BACLOFEN 10 MG: 10 TABLET ORAL at 09:07

## 2023-07-06 RX ADMIN — ASPIRIN 325 MG: 325 TABLET, COATED ORAL at 09:07

## 2023-07-06 RX ADMIN — FENTANYL CITRATE 50 MCG: 50 INJECTION, SOLUTION INTRAMUSCULAR; INTRAVENOUS at 04:07

## 2023-07-06 RX ADMIN — CLOPIDOGREL BISULFATE 75 MG: 75 TABLET ORAL at 09:07

## 2023-07-06 RX ADMIN — FLUOXETINE 20 MG: 20 CAPSULE ORAL at 09:07

## 2023-07-06 RX ADMIN — LIDOCAINE HYDROCHLORIDE 5 ML: 10 INJECTION, SOLUTION INFILTRATION; PERINEURAL at 04:07

## 2023-07-06 RX ADMIN — ATORVASTATIN CALCIUM 80 MG: 40 TABLET, FILM COATED ORAL at 09:07

## 2023-07-06 NOTE — PROCEDURES
Radiology Post-Procedure Note    Pre Op Diagnosis: Intracranial stenosis     Post Op Diagnosis: See section below     Procedure: Cerebral angiogram    Procedure performed by: LIS AARON    Written Informed Consent Obtained: Yes    Specimen Removed: NO    Estimated Blood Loss: Minimal    Procedure report:     A 5F sheath was placed into the right femoral artery and a 5F James catheter was advanced into the aortic arch.  Six vessel angiography of the brain was performed after injection into each of these vessels.    Preliminary interpretation:     Prior described left distal M1/proximal M2 segment stenosis was noted and characterized - appears stable in severity compared to prior / with corresponding delayed rate of perfusion in the posterior frontal / parietal MCA territories - with collateralization through right A1-Acom-left PENELOPE complex.    Posterior circulation appears diminutive in caliber compared to standard norms - however stable in pattern compared to prior angio.     No right anterior circulation stenosis noted.     Please see Imaging report for full details.    A right femoral artery angiogram was performed, the sheath removed and hemostasis achieved using manual pressure.  No hematoma was present at the time of hemostasis.    The patient tolerated the procedure well.         Allan Lee MD     Neuro Endovascular Surgery Fellow   [facility]

## 2023-07-06 NOTE — PLAN OF CARE
Problem: Adult Inpatient Plan of Care  Goal: Plan of Care Review  Outcome: Ongoing, Progressing  Goal: Patient-Specific Goal (Individualized)  Outcome: Ongoing, Progressing  Goal: Absence of Hospital-Acquired Illness or Injury  Outcome: Ongoing, Progressing  Goal: Optimal Comfort and Wellbeing  Outcome: Ongoing, Progressing  Goal: Readiness for Transition of Care  Outcome: Ongoing, Progressing     Problem: Infection  Goal: Absence of Infection Signs and Symptoms  Outcome: Ongoing, Progressing    POC reviewed with the patient and they verbalized understanding. All comments and concerns addressed. Bed locked in lowest position with bed alarm set, call light within reach. Safety and seizure precautions maintained. VSS, see flowsheets. No events this shift. Will continue to monitor for changes to POC and clinical condition.

## 2023-07-06 NOTE — H&P
Inpatient Radiology Pre-procedure Note    History of Present Illness:  Ally Ponce is a 43 y.o. 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 Hence, IR consulted for diagnostic cerebral angiogram to characterize the intracranial stenosis and rest of the cerebral vasculature for surgical planning.     Admission H&P reviewed.    Past Medical History:   Diagnosis Date    Dysarthria     Hemiparesis affecting right side as late effect of cerebrovascular accident (CVA)     High cholesterol     Hyperreflexia of lower extremity     [place]     Stroke 06/21/2019    Stroke 07/2019     Past Surgical History:   Procedure Laterality Date    CEREBRAL ANGIOGRAM N/A 10/29/2019    Procedure: ANGIOGRAM-CEREBRAL;  Surgeon: [facility] Diagnostic Provider;  Location: [facility] OR [address] [place] [address];  Service: Radiology;  Laterality: N/A;   190/[place]    CEREBRAL ANGIOGRAM N/A 08/18/2020    Procedure: ANGIOGRAM-CEREBRAL;  Surgeon: [facility] Diagnostic Provider;  [facility];  Service: Radiology;  Laterality: N/A;  190/[place]    LAPAROSCOPIC SALPINGECTOMY Bilateral 11/08/2021    Procedure: SALPINGECTOMY, LAPAROSCOPIC;  Surgeon: Koko Calle MD;  Location: [facility] OR;  Service: OB/GYN;  Laterality: Bilateral;    LAPAROSCOPIC TOTAL HYSTERECTOMY N/A 11/08/2021    Procedure: HYSTERECTOMY, TOTAL, LAPAROSCOPIC;  Surgeon: Koko Calle MD;  Location: [facility] OR;  Service: OB/GYN;  Laterality: N/A;    TUBAL LIGATION  2005       Review of Systems:   As documented in primary team H&P    Home Meds:   Prior to Admission medications    Medication Sig Start Date End Date Taking? Authorizing Provider   aspirin (ECOTRIN) 325 MG EC tablet Take 1 tablet (325 mg total) by mouth once daily. 8/5/19 3/27/23  Charmaine You PA-C   atorvastatin (LIPITOR) 80 MG tablet Take 1 tablet (80 mg total) by mouth once daily. 8/5/19 3/27/23  Charmaine You PA-C    baclofen (LIORESAL) 10 MG tablet Take 5 mg by mouth 2 (two) times daily as needed. 9/10/19   Historical Provider   calcium carbonate (CALCIUM 300 ORAL) Take 50 mg by mouth Daily.    Historical Provider   clopidogrel (PLAVIX) 75 mg tablet Take 75 mg by mouth once daily.    Historical Provider   cyanocobalamin, vitamin B-12, 50 mcg tablet Take 50 mcg by mouth once daily.    Historical Provider   FLUoxetine 20 MG capsule Take 1 capsule (20 mg total) by mouth every evening. 8/5/19 3/27/23  Charmaine You PA-C   fluticasone propionate (FLONASE) 50 mcg/actuation nasal spray 1 spray by Each Nostril route daily as needed. 1/28/20   Historical Provider   gabapentin (NEURONTIN) 100 MG capsule TAKE ONE CAPSULE BY MOUTH THREE TIMES DAILY 8/19/22   Caridad Benjamin MD   magnesium 250 mg Tab Take 250 mg by mouth once.    Historical Provider   magnesium oxide (MAG-OX) 400 mg (241.3 mg magnesium) tablet TAKE ONE TABLET BY MOUTH ONCE DAILY 6/1/23   Caridad Benjamin MD   montelukast (SINGULAIR) 10 mg tablet Take 10 mg by mouth every evening.    Historical Provider   multivitamin Tab Take 1 tablet by mouth once daily.    Historical Provider   ondansetron (ZOFRAN) 4 MG tablet Take 1 tablet (4 mg total) by mouth every 8 (eight) hours as needed. 8/5/19   Charmaine You PA-C   pantoprazole (PROTONIX) 40 MG tablet Take 40 mg by mouth daily as needed. 9/10/19   Historical Provider   polyethylene glycol (GLYCOLAX) 17 gram PwPk Take 17 g by mouth daily as needed (constipation).  Patient not taking: Reported on 6/14/2022 2/5/21   Caridad Benjamin MD     Scheduled Meds:    aspirin  325 mg Oral Daily    atorvastatin  80 mg Oral Daily    baclofen  10 mg Oral BID    clopidogreL  75 mg Oral Daily    FLUoxetine  20 mg Oral QHS    gabapentin  100 mg Oral Daily    montelukast  10 mg Oral QHS     Continuous Infusions:   PRN Meds:acetaminophen, bisacodyL, dextrose 10%, dextrose 10%, glucagon (human recombinant), glucose, glucose, melatonin,  ondansetron, polyethylene glycol, promethazine  Anticoagulants/Antiplatelets: no anticoagulation    Allergies: Review of patient's allergies indicates:  No Known Allergies  Sedation Hx: have not been any systemic reactions    Labs:  No results for input(s): INR in the last 168 hours.    Invalid input(s):  PT,  PTT    Recent Labs   Lab 07/04/23  0718   WBC 7.57   HGB 15.1   HCT 46.3   MCV 92         Recent Labs   Lab 07/04/23 0718         K 4.5      CO2 26   BUN 14   CREATININE 0.8   CALCIUM 9.3   MG 1.8   ALT 20   AST 18   ALBUMIN 3.8   BILITOT 0.5         Vitals:  Temp: 96.5 °F (35.8 °C) (07/06/23 1123)  Pulse: 63 (07/06/23 1123)  Resp: 18 (07/06/23 1123)  BP: 127/76 (07/06/23 1123)  SpO2: 99 % (07/06/23 1123)     Physical Exam:  ASA: 3  Mallampati: 2    General: no acute distress  Mental Status: alert and oriented to person, place and time  HEENT: normocephalic, atraumatic  Chest: unlabored breathing  Heart: regular heart rate  Abdomen: nondistended  Extremity: moves all extremities      Plan:  Sedation Plan: Up to moderate   Patient will undergo: diagnostic cerebral angiogram to characterize the intracranial stenosis and rest of the cerebral vasculature for surgical planning.         Allan Lee MD     Neuro Endovascular Surgery Fellow   [facility]

## 2023-07-06 NOTE — PLAN OF CARE
Pt arrived to IR 4 for cerebral angiogram. Pt oriented to unit and staff. Plan of care reviewed with patient, patient verbalizes understanding. Comfort measures utilized. Pt safely transferred from stretcher to procedural table. Fall risk reviewed with patient, fall risk interventions maintained. Safety strap applied, positioner pillows utilized to minimize pressure points. Blankets applied. Pt prepped and draped utilizing standard sterile technique. Patient placed on continuous monitoring, as required by sedation policy. Timeouts completed utilizing standard universal time-out, per department and facility policy. RN to remain at bedside, continuous monitoring maintained. Pt resting comfortably. Denies pain/discomfort. Will continue to monitor. See flow sheets for monitoring, medication administration, and updates.

## 2023-07-06 NOTE — PROGRESS NOTES
[facility] - Neurosurgery ([facility])  [facility] Medicine  Progress Note    Patient Name: Ally Ponce  MRN: [id number]  Patient Class: IP- Inpatient   Admission Date: 7/1/2023  Length of Stay: 3 days  Attending Physician: Fidelia Ray MD  Primary Care Provider: Mariana Mendoza NP        Subjective:     Principal Problem:Acute encephalopathy        HPI:  Ally Ponce is a 43 year old white woman with Bergman bergman disease, history of left basal ganglia stroke on 7/27/2019 with right hemiparesis, spasticity, dysarthria, unsteady gait, chronic right-sided headaches, hyperlipidemia. She lives in [place], [place]. She is legally . Her neurologist is Dr. Caridad Benjamin.   She was seen at [facility] on 6/28/2023 for slurred speech and mild headaches since 6/25/2023 and intermittent confusion, memory loss, facial twitching, and worsening right lower extremity weakness causing dragging of her right leg since 6/26/2023. Brain MRI showed nonspecific subcentimeter abnormal signal foci in the bilateral supratentorial white matter with a differential of microvascular ischemic changes, focal gliosis, migraine headaches, demyelinating processes, Lyme disease, and vasculitis.    Symptoms persisted. Her sister informed Dr. Benjamin's clinic on 6/29/2023.    She was admitted to [facility] on 7/1/2023 with persistent symptoms, as well as facial fasciculations. On physical examination, she had intermittent rhythmic contractions of the chin and fasciculations of the left lower eyelid with nystagmus. She was awake and interactive while this happened. She had no visual field deficit. She had stable chronic dysarthria. Head CT showed nothing acute. Neurology was contacted at [facility] and transfer was recommended for Neurology evaluation and one possible continuous electroencephalogram. Upon arrival to [facility], she was  admitted to Hospital Medicine Team N. Symptoms had reportedly improved since she was admitted to [facility], but she continued to have delayed speech and it took her longer to think of what she wanted to say. Her dysarthria and right hemiparesis were at baseline.       Overview/Hospital Course:  EEG showed mild diffuse encephalopathy. She had rhythmic chin movements which were not associated with epileptic activity on EEG. She reported that she had been having decreased hearing that went along with her other recent symptoms but her ears were not examined with an otoscope during her recent emergency department visits. She was scheduled for cerebral angiogram by Interventional Radiology on 7/6/2023.     Interval History:  7/6- /66 VSS. 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 NPO. No new lab.   - Severe numbness right hand, able to lift knees off bed, difficulty w dorsiflexion of the feet      Interval History:see above    Review of Systems   Constitutional:  Negative for chills and fever.   HENT:  Positive for hearing loss.    Respiratory:  Negative for cough, choking and shortness of breath.    Gastrointestinal:  Negative for abdominal pain, constipation, diarrhea, nausea and vomiting.   Neurological:  Positive for weakness and headaches. Negative for syncope.   Psychiatric/Behavioral:  Negative for behavioral problems and confusion.    Objective:     Vital Signs (Most Recent):  Temp: 98 °F (36.7 °C) (07/06/23 0733)  Pulse: 62 (07/06/23 0733)  Resp: 20 (07/06/23 0733)  BP: 108/66 (07/06/23 0733)  SpO2: 97 % (07/06/23 0733) Vital Signs (24h Range):  Temp:  [96.7 °F (35.9 °C)-98.7 °F (37.1 °C)] 98 °F (36.7 °C)  Pulse:  [57-69] 62  Resp:  [15-20] 20  SpO2:  [96 %-98 %] 97 %  BP: ()/(57-77) 108/66        There is no height or weight on file to calculate BMI.    Intake/Output Summary (Last 24 hours) at 7/6/2023 1039  Last data filed at 7/5/2023 2200  Gross per 24 hour    Intake --   Output 550 ml   Net -550 ml           Physical Exam  Vitals and nursing note reviewed.   Constitutional:       General: She is not in acute distress.     Appearance: She is well-developed. She is not toxic-appearing or diaphoretic.   Pulmonary:      Effort: Pulmonary effort is normal. No respiratory distress.      Breath sounds: Rales present. No wheezing or rhonchi.   Abdominal:      Tenderness: There is no abdominal tenderness. There is no guarding.   Musculoskeletal:      Right lower leg: No edema.      Left lower leg: No edema.   Skin:     Coloration: Skin is not jaundiced.   Neurological:      Mental Status: She is alert.      Cranial Nerves: Dysarthria present.      Motor: Weakness present. No seizure activity.   Psychiatric:         Attention and Perception: Attention normal.         Mood and Affect: Affect normal.         Behavior: Behavior is not agitated or aggressive.           Significant Labs: All pertinent labs within the past 24 hours have been reviewed.    Significant Imaging: I have reviewed all pertinent imaging results/findings within the past 24 hours.      Assessment/Plan:      * Acute encephalopathy  Neurology following. EEG showed encephalopathy. Facial fasciculations did not correlate with any seizure activity. Will get cerebral angiogram on 7/6/2023. Neuro checks. Delirium precautions. Ears can be examined with an otoscope if her acute symptoms resolve and hearing remains abnormal.     7/6- stable    History of ischemic left MCA stroke  History of left basal ganglia stroke on 7/27/2019 with right hemiparesis, spasticity, dysarthria, unsteady gait, chronic right-sided headaches,    MRI 6/29- Subcentimeter abnormal signal foci in the bilateral supratentorial white matter are nonspecific.  Given the lacunar infarct and history of intracranial atherosclerosis, these may be white matter microvascular ischemic changes are focal gliosis.  Other differential considerations include  migraine headaches occurring at the time of the exam, demyelinating processes, Lyme disease, and vasculitides.  Otherwise, no acute intracranial process is identified.  2. Scattered chronic sinus disease. Similar appearance of chronic lacunar infarct in the left frontal periventricular white matter extending into the left lenticular nucleus.  [place] clarification.  0 Angiogram on 7/6 per IR      Hemiparesis of right dominant side as late effect of cerebral infarction  History of ischemic left MCA stroke  Bergman bergman disease  Chronic. Continue home aspirin, clopidrogel, and atorvastatin.    Spasticity  Chronic. Stable. Continue home baclofen.    Bergman bergman disease  See above  Angiogram 7/6 planned      Chronic right-sided headaches  Continue home meds      VTE Risk Mitigation (From admission, onward)         Ordered     IP VTE LOW RISK PATIENT  Once         07/01/23 2246     Place sequential compression device  Until discontinued         07/01/23 2246                Discharge Planning   BLAS: 7/7/2023     Code Status: Full Code   Is the patient medically ready for discharge?: No    Reason for patient still in hospital (select all that apply): Patient trending condition  Discharge Plan A: Home with family          Fidelia Ray MD  Department of Hospital Medicine   [facility] - Neurosurgery ([facility])

## 2023-07-06 NOTE — CONSULTS
Subjective:       Patient ID: Ally Ponce is a 43 y.o. female.    Reason for Consult: Leslie Leslie, encephalopathy    Interval History:  Ally Ponce is seen in follow up. Their condition is clinically stable.  Patient has been seen before interventional radiology angiogram.  Case discussed previously with Neurosurgery endovascular attending.  Patient still has bilateral mentalis muscle activation in a rhythmic fashion.  Patient was seen with family member at bedside.  Family member notes that there has not been any significant change or deviation or worsening of mental status however she is not at baseline at this time.  I have discussed with the patient and her loved 1 at bedside the plan for interventional radiology and depending on what the interventional team ends up doing, there are other plans for possible intervention from a pharmacologic standpoint to see about the behavioral changes and this continuous mentalis muscle flexion..     Objective:     Vitals:    07/06/23 1123   BP: 127/76   Pulse: 63   Resp: 18   Temp: 96.5 °F (35.8 °C)     Rhythmic regular medium amplitude bilateral mentalis muscle flexion noted on exam today.  Patient able to speak converse and follow simple commands despite this motor function on her face.  Focused examination was undertaken today. Over 50% of the 50 minute visit time was spent in chart review, reviewing results from diagnostic studies, face to face discussion of goals of care, symptom assessment, coordination of care and building her treatment plan based on these factors.     Results for orders placed or performed during the hospital encounter of 06/28/23   MRI Brain Without Contrast    Narrative    MRI BRAIN WITHOUT CONTRAST:    CPT: 77404    HISTORY: Acute onset of focal neurologic deficits with dysphagia, left facial droop, and right lower extremity weakness.  Prior history of stroke.    Comparison: 04/13/2023    TECHNIQUE: T1, T2, proton density, FLAIR, diffusion weighted,  and ADC-mapping sequences were acquired through the brain in multiple planes without contrast. Gradient or susceptibility weighted imaging sequences were also performed.    FINDINGS:    The ventricles and basal cisterns appear normal.  There is similar appearance of a chronic lacunar infarct in the left frontal periventricular and corona radiata white matter extending into the left lenticular nucleus.  There are few subcentimeter T2 hyperintense foci without restricted diffusion in the bilateral supratentorial white matter.  No other cerebral or cerebellar parenchymal abnormality is identified, and the brainstem and josé manuel are unremarkable. There is no hemorrhage, midline shift, significant mass-effect, extra-axial fluid collection, or restricted diffusion.  Flow voids are    present in the major visualized intracranial vessels with variant anatomy best visualized on the MRA of the brain performed on 04/13/2023.  There is mucosal thickening and mucous retention cyst in the right maxillary sinus with minimal mucosal thickening elsewhere in the paranasal sinuses.  The mastoid air cells are clear. The calvarium is intact.      Impression    1. Subcentimeter abnormal signal foci in the bilateral supratentorial white matter are nonspecific.  Given the lacunar infarct and history of intracranial atherosclerosis, these may be white matter microvascular ischemic changes are focal gliosis.  Other differential considerations include migraine headaches occurring at the time of the exam, demyelinating processes, Lyme disease, and vasculitides.  Otherwise, no acute intracranial process is identified.  2. Scattered chronic sinus disease. Similar appearance of chronic lacunar infarct in the left frontal periventricular white matter extending into the left lenticular nucleus.  [employer]k clarification.      Electronically signed by: Aaron Ugarte MD  Date:    06/29/2023  Time:    07:57   CT Head Without Contrast    Narrative     EXAMINATION:  CT HEAD WITHOUT CONTRAST    CLINICAL HISTORY:  R leg weakness;    TECHNIQUE:  Low dose axial CT images obtained throughout the head without intravenous contrast. Sagittal and coronal reconstructions were performed.  An automated dose exposure technique was utilized.  This limits radiation does the patient.    COMPARISON:  04/13/2023    FINDINGS:  Intracranial compartment:    Ventricles and sulci are normal in size for age without evidence of hydrocephalus. No extra-axial blood or fluid collections.    The brain parenchyma appears normal. No parenchymal mass, hemorrhage, edema or major vascular distribution infarct.    Skull/extracranial contents (limited evaluation): No fracture. Mastoid air cells and paranasal sinuses are essentially clear.      Impression    No acute intracranial abnormality.      Electronically signed by: Thang Bernal MD  Date:    06/28/2023  Time:    21:52   Results for orders placed or performed during the hospital encounter of 04/13/23   MRA Brain    Addendum: 4/14/2023      Please note there is developmental variation with the right ophthalmic artery originating from the right A1 segment and the PCOM originating from the MCA.      Electronically signed by: Geo Espinoza  Date:    04/14/2023  Time:    08:55      Narrative    EXAMINATION:  MRA BRAIN WITHOUT CONTRAST    CLINICAL HISTORY:  Neuro deficit, acute, stroke suspected;Other symptoms and signs involving the nervous system    TECHNIQUE:  3D time-of-flight noncontrast MR angiogram of the intracranial vasculature with multiple MIP reconstructions.    COMPARISON:  MRI brain 07/13/2019    FINDINGS:  There is focal moderate to severe stenosis in the distal left MCA at the level of the MCA bifurcation with additional stenoses/irregularity extending to the proximal M2 branches.  This is similar in appearance to the prior study.    No new major branch stenosis/occlusion at the remainder of the [id number].    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  The basilar artery is small in size with prominent posterior communicating arteries consistent with a fetal PCA on the left and near fetal PCA on the right.    No aneurysm is identified.      Impression    Overall stable appearance of the [id number] with moderate to severe stenosis and irregularity involving the distal left M1 segment/MCA bifurcation and proximal MCA branches.    Electronically signed by resident: Brandon Headley  Date:    04/13/2023  Time:    15:46    Electronically signed by: Geo Espinoza  Date:    04/14/2023  Time:    08:29   Results for orders placed or performed during the hospital encounter of 07/27/19   MRI Brain W WO Contrast    Narrative    EXAMINATION:  MRI BRAIN W WO CONTRAST    CLINICAL HISTORY:  Stroke;  The patient is a 39 year old female recently admitted in [facility] with R sided weakness and was diagnosed to have a stroke, for which she was started on ASA and Plavix.   She then was noted to have aphasia, R sided numbness/weakness and was again hospitalized in [place] from 7/2-7/7 and was diagnosed with cerebral vasculitis for which she was given steroids.  There was initially improvement of symptoms.  She was brought to the ER today with complaints of R sided numbness, difficulty speaking/stuttering, inability to care for herself nor her daughter and difficulty ambulating.  Patient denies any ocular or urinary symptoms. Denies any trauma, recent trips, industrial exposures.  Patient had MRI of brain which showed L basal ganglia infarct and CTA of head and neck showed narrowing of distal L MCA. Neurology was consulted and [facility] called for admission.  Denies hx of cx, seizures, MS.    TECHNIQUE:  Multiplanar multisequence MR imaging of the brain was performed before and after the administration of 7 mL Gadavist intravenous contrast.    COMPARISON:  CTA head neck, 07/27/2019.  MRI brain with without contrast,  07/27/2019.    FINDINGS:  INTRACRANIAL: Persistent restricted diffusion in the anterior and posterior putamen on the left with area of abnormal signal and presumed volume loss in the posterior putamen extending into the left corona radiata.  No definite associated enhancement.  Intrinsic T1 hyperintensity at the medial aspect and along the margins of the focus in the posterior putamen.  Given the relative lack of susceptibility this is favored to represent encephalomalacia related to prior posterior putaminal infarct though associated hemorrhage/petechial hemorrhage is not excluded.  Trace bilateral occipital sulcal FLAIR signal hyperintensity which is unchanged from prior study.  Scattered T2 FLAIR hyperintense white matter foci are present, likely related to chronic microvascular ischemic change.   No abnormal enhancement demonstrated.  No evidence of acute intracranial hemorrhage.  No hydrocephalus.  Mild tonsillar ectopia with mild crowding at the foramen magnum which appears slightly increased from prior study.  Preserved inferior rounding of the tonsillar tips.  Visualized pituitary gland and infundibulum are normal.    SINUSES: Trace bilateral maxillary sinus mucosal thickening.    ORBITS: Visualized orbits are normal.      Impression    1. Likely old left posterior putaminal infarct with encephalomalacia.  Subacute infarct of the caudate head and anterior and extreme posterior left putamen.  2. Non-specific slight increased tonsillar ectopia with mild crowding at the foramen magnum compared to study from 07/27/2019 preserved rounding of the inferior tonsillar tips.  3. Stable subtle nonspecific sulcal FLAIR signal abnormality in the occipital lobes bilaterally.  This is indeterminate and may be artifactual.      Electronically signed by: Thang Zurita  Date:    07/31/2019  Time:    16:18       Assessment:     Mariama Leslie  2. Encephalopathy  3. Left M1 and M2 proximal stenosis    Plan:       43-year-old  female presents for evaluation of abnormal facial movements and encephalopathy.  Prior to transfer to [facility], she would previously presented to emergency room twice in that week.  Query of a post viral issue verses slow progression of left-sided M1 and M2 known intracranial stenosis.  EEG negative earlier in the week, however given the location of her rhythmic movement, this is not necessarily rule out epileptic status partialis continua.  If the angiogram is negative for worsening/no therapeutic option is pursued, neurology will follow and possibly make a recommendation regarding antiepileptic that may be used to treat a movement disorder, as this involves basal ganglia from a neuro anatomic standpoint.  I have reviewed the case with epilepsy attending and with endovascular Neurosurgery.  Neurology to follow.        Ace Torres MD, FAAN    This note is dictated on M*Modal Fluency Direct word recognition program. There are word recognition mistakes that are occasionally missed on review.

## 2023-07-06 NOTE — NURSING
Patient received s/p cerebral angiogram in MPU bay 4. See VS/neuro flowsheet. Procedure site dressing to R groin is clean and dry, no evidence of bleeding or hematoma formation. Patient to lay flat with right leg straight until 2242. Fall precautions reviewed. Bed in lowest, locked position. Call light within reach.

## 2023-07-06 NOTE — SUBJECTIVE & OBJECTIVE
Interval History:see above    Review of Systems   Constitutional:  Negative for chills and fever.   HENT:  Positive for hearing loss.    Respiratory:  Negative for cough, choking and shortness of breath.    Gastrointestinal:  Negative for abdominal pain, constipation, diarrhea, nausea and vomiting.   Neurological:  Positive for weakness and headaches. Negative for syncope.   Psychiatric/Behavioral:  Negative for behavioral problems and confusion.    Objective:     Vital Signs (Most Recent):  Temp: 98 °F (36.7 °C) (07/06/23 0733)  Pulse: 62 (07/06/23 0733)  Resp: 20 (07/06/23 0733)  BP: 108/66 (07/06/23 0733)  SpO2: 97 % (07/06/23 0733) Vital Signs (24h Range):  Temp:  [96.7 °F (35.9 °C)-98.7 °F (37.1 °C)] 98 °F (36.7 °C)  Pulse:  [57-69] 62  Resp:  [15-20] 20  SpO2:  [96 %-98 %] 97 %  BP: ()/(57-77) 108/66        There is no height or weight on file to calculate BMI.    Intake/Output Summary (Last 24 hours) at 7/6/2023 1039  Last data filed at 7/5/2023 2200  Gross per 24 hour   Intake --   Output 550 ml   Net -550 ml           Physical Exam  Vitals and nursing note reviewed.   Constitutional:       General: She is not in acute distress.     Appearance: She is well-developed. She is not toxic-appearing or diaphoretic.   Pulmonary:      Effort: Pulmonary effort is normal. No respiratory distress.      Breath sounds: Rales present. No wheezing or rhonchi.   Abdominal:      Tenderness: There is no abdominal tenderness. There is no guarding.   Musculoskeletal:      Right lower leg: No edema.      Left lower leg: No edema.   Skin:     Coloration: Skin is not jaundiced.   Neurological:      Mental Status: She is alert.      Cranial Nerves: Dysarthria present.      Motor: Weakness present. No seizure activity.   Psychiatric:         Attention and Perception: Attention normal.         Mood and Affect: Affect normal.         Behavior: Behavior is not agitated or aggressive.           Significant Labs: All pertinent labs  within the past 24 hours have been reviewed.    Significant Imaging: I have reviewed all pertinent imaging results/findings within the past 24 hours.

## 2023-07-06 NOTE — PLAN OF CARE
Cerebral angiogram procedure completed. Patient tolerated well. Patient AAOx3, no distress noted, respirations even and unlabored, will continue to monitor. VSS. Manual pressure held due to unsuccessful closure device; hemostasis achieved at 16:42. Patient to lay flat for 6 hours until 22:42  on 07/06/2023 per MD. Right groin site clean, dry, and intact; no bleeding or hematoma noted. Patient to be transferred to MPU for post-procedural recovery per MD. Report to be given at bedside to RN.

## 2023-07-06 NOTE — PLAN OF CARE
Problem: Adult Inpatient Plan of Care  Goal: Plan of Care Review  Outcome: Ongoing, Progressing     Problem: Infection  Goal: Absence of Infection Signs and Symptoms  Outcome: Ongoing, Progressing   POC and medications reviewed. Continue to monitor patient for absence of bleeding from procedure site. Monitor for pain.

## 2023-07-06 NOTE — ASSESSMENT & PLAN NOTE
Neurology following. EEG showed encephalopathy. Facial fasciculations did not correlate with any seizure activity. Will get cerebral angiogram on 7/6/2023. Neuro checks. Delirium precautions. Ears can be examined with an otoscope if her acute symptoms resolve and hearing remains abnormal.     7/6- stable

## 2023-07-06 NOTE — ASSESSMENT & PLAN NOTE
History of left basal ganglia stroke on 7/27/2019 with right hemiparesis, spasticity, dysarthria, unsteady gait, chronic right-sided headaches,    MRI 6/29- Subcentimeter abnormal signal foci in the bilateral supratentorial white matter are nonspecific.  Given the lacunar infarct and history of intracranial atherosclerosis, these may be white matter microvascular ischemic changes are focal gliosis.  Other differential considerations include migraine headaches occurring at the time of the exam, demyelinating processes, Lyme disease, and vasculitides.  Otherwise, no acute intracranial process is identified.  2. Scattered chronic sinus disease. Similar appearance of chronic lacunar infarct in the left frontal periventricular white matter extending into the left lenticular nucleus.  [employer]hawk clarification.  0 Angiogram on 7/6 per IR

## 2023-07-07 PROBLEM — R53.81 PHYSICAL DECONDITIONING: Status: ACTIVE | Noted: 2023-07-07

## 2023-07-07 PROCEDURE — 99223 PR INITIAL HOSPITAL CARE,LEVL III: ICD-10-PCS | Mod: ,,, | Performed by: PSYCHIATRY & NEUROLOGY

## 2023-07-07 PROCEDURE — 25000003 PHARM REV CODE 250: Performed by: PHYSICIAN ASSISTANT

## 2023-07-07 PROCEDURE — 11000001 HC ACUTE MED/SURG PRIVATE ROOM

## 2023-07-07 PROCEDURE — 25000003 PHARM REV CODE 250: Performed by: HOSPITALIST

## 2023-07-07 PROCEDURE — 99232 PR SUBSEQUENT HOSPITAL CARE,LEVL II: ICD-10-PCS | Mod: ,,, | Performed by: HOSPITALIST

## 2023-07-07 PROCEDURE — 99232 SBSQ HOSP IP/OBS MODERATE 35: CPT | Mod: ,,, | Performed by: HOSPITALIST

## 2023-07-07 PROCEDURE — 99223 1ST HOSP IP/OBS HIGH 75: CPT | Mod: ,,, | Performed by: PSYCHIATRY & NEUROLOGY

## 2023-07-07 RX ORDER — BACLOFEN 10 MG/1
10 TABLET ORAL 2 TIMES DAILY
Qty: 60 TABLET | Refills: 11 | Status: SHIPPED | OUTPATIENT
Start: 2023-07-07 | End: 2024-07-06

## 2023-07-07 RX ORDER — LEVETIRACETAM 500 MG/1
500 TABLET ORAL 2 TIMES DAILY
Qty: 60 TABLET | Refills: 11 | Status: SHIPPED | OUTPATIENT
Start: 2023-07-07 | End: 2024-07-06

## 2023-07-07 RX ORDER — LEVETIRACETAM 500 MG/1
500 TABLET ORAL 2 TIMES DAILY
Status: DISCONTINUED | OUTPATIENT
Start: 2023-07-07 | End: 2023-07-08 | Stop reason: HOSPADM

## 2023-07-07 RX ORDER — BISACODYL 10 MG
10 SUPPOSITORY, RECTAL RECTAL DAILY PRN
Qty: 30 SUPPOSITORY | Refills: 0 | Status: SHIPPED | OUTPATIENT
Start: 2023-07-07

## 2023-07-07 RX ORDER — ACETAMINOPHEN 325 MG/1
650 TABLET ORAL EVERY 4 HOURS PRN
Qty: 30 TABLET | Refills: 0 | Status: SHIPPED | OUTPATIENT
Start: 2023-07-07

## 2023-07-07 RX ADMIN — ACETAMINOPHEN 650 MG: 325 TABLET ORAL at 04:07

## 2023-07-07 RX ADMIN — ATORVASTATIN CALCIUM 80 MG: 40 TABLET, FILM COATED ORAL at 09:07

## 2023-07-07 RX ADMIN — FLUOXETINE 20 MG: 20 CAPSULE ORAL at 09:07

## 2023-07-07 RX ADMIN — BACLOFEN 10 MG: 10 TABLET ORAL at 09:07

## 2023-07-07 RX ADMIN — CLOPIDOGREL BISULFATE 75 MG: 75 TABLET ORAL at 09:07

## 2023-07-07 RX ADMIN — ASPIRIN 325 MG: 325 TABLET, COATED ORAL at 09:07

## 2023-07-07 RX ADMIN — ACETAMINOPHEN 650 MG: 325 TABLET ORAL at 09:07

## 2023-07-07 RX ADMIN — LEVETIRACETAM 500 MG: 500 TABLET, FILM COATED ORAL at 10:07

## 2023-07-07 RX ADMIN — Medication 6 MG: at 09:07

## 2023-07-07 RX ADMIN — MONTELUKAST 10 MG: 10 TABLET, FILM COATED ORAL at 09:07

## 2023-07-07 RX ADMIN — ACETAMINOPHEN 650 MG: 325 TABLET ORAL at 10:07

## 2023-07-07 RX ADMIN — LEVETIRACETAM 500 MG: 500 TABLET, FILM COATED ORAL at 09:07

## 2023-07-07 RX ADMIN — GABAPENTIN 100 MG: 100 CAPSULE ORAL at 04:07

## 2023-07-07 NOTE — PROGRESS NOTES
[facility] - Neurosurgery ([facility])  [facility] Medicine  Progress Note    Patient Name: Ally Ponce  MRN: [id number]  Patient Class: IP- Inpatient   Admission Date: 7/1/2023  Length of Stay: 4 days  Attending Physician: Fidelia Ray MD  Primary Care Provider: Mariana Mendoza NP        Subjective:     Principal Problem:Acute encephalopathy        HPI:  Ally Ponce is a 43 year old white woman with Bergman bergman disease, history of left basal ganglia stroke on 7/27/2019 with right hemiparesis, spasticity, dysarthria, unsteady gait, chronic right-sided headaches, hyperlipidemia. She lives in [place], [place]. She is legally . Her neurologist is Dr. Caridad Benjamin.   She was seen at [facility] Emergency Department on 6/28/2023 for slurred speech and mild headaches since 6/25/2023 and intermittent confusion, memory loss, facial twitching, and worsening right lower extremity weakness causing dragging of her right leg since 6/26/2023. Brain MRI showed nonspecific subcentimeter abnormal signal foci in the bilateral supratentorial white matter with a differential of microvascular ischemic changes, focal gliosis, migraine headaches, demyelinating processes, Lyme disease, and vasculitis.    Symptoms persisted. Her sister informed Dr. Benjamin's clinic on 6/29/2023.    She was admitted to [facility] on 7/1/2023 with persistent symptoms, as well as facial fasciculations. On physical examination, she had intermittent rhythmic contractions of the chin and fasciculations of the left lower eyelid with nystagmus. She was awake and interactive while this happened. She had no visual field deficit. She had stable chronic dysarthria. Head CT showed nothing acute. Neurology was contacted at [facility] and transfer was recommended for Neurology evaluation and one possible continuous electroencephalogram. Upon arrival to [facility], she was  admitted to Hospital Medicine Team N. Symptoms had reportedly improved since she was admitted to [facility], but she continued to have delayed speech and it took her longer to think of what she wanted to say. Her dysarthria and right hemiparesis were at baseline.       Overview/Hospital Course:  EEG showed mild diffuse encephalopathy. She had rhythmic chin movements which were not associated with epileptic activity on EEG. She reported that she had been having decreased hearing that went along with her other recent symptoms but her ears were not examined with an otoscope during her recent emergency department visits. She was scheduled for cerebral angiogram by Interventional Radiology on 7/6/2023.     Interval History:  7/6- /66 VSS. 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 NPO. No new lab.   - Severe numbness right hand, able to lift knees off bed, difficulty w dorsiflexion of the feet    7/7 - cardiac diet, chat w neurology about AEDs.   Cerebral angiogram: Prior described left distal M1/proximal M2 segment stenosis was noted and characterized - appears stable in severity compared to prior / with corresponding delayed rate of perfusion in the posterior frontal / parietal MCA territories - with collateralization through right A1-Acom-left PENELOPE complex.Posterior circulation appears diminutive in caliber compared to standard norms - however stable in pattern compared to prior angio.   - Keppra started   - pt afraid to get out of bed and family wants PT eval. PT/OT consulted, nurse to assist up to chair.       Interval History:see above    Review of Systems   Constitutional:  Negative for chills and fever.   HENT:  Positive for hearing loss.    Respiratory:  Negative for cough, choking and shortness of breath.    Gastrointestinal:  Negative for abdominal pain, constipation, diarrhea, nausea and vomiting.   Neurological:  Positive for weakness and headaches. Negative for  syncope.   Psychiatric/Behavioral:  Negative for behavioral problems and confusion.    Objective:     Vital Signs (Most Recent):  Temp: 97.5 °F (36.4 °C) (07/07/23 0533)  Pulse: 62 (07/07/23 0533)  Resp: 18 (07/07/23 0533)  BP: 97/62 (07/07/23 0533)  SpO2: 95 % (07/07/23 0533) Vital Signs (24h Range):  Temp:  [96.5 °F (35.8 °C)-98.5 °F (36.9 °C)] 97.5 °F (36.4 °C)  Pulse:  [56-67] 62  Resp:  [9-20] 18  SpO2:  [94 %-100 %] 95 %  BP: ()/(61-76) 97/62        There is no height or weight on file to calculate BMI.    Intake/Output Summary (Last 24 hours) at 7/7/2023 0753  Last data filed at 7/6/2023 2245  Gross per 24 hour   Intake --   Output 1600 ml   Net -1600 ml           Physical Exam  Vitals and nursing note reviewed.   Constitutional:       General: She is not in acute distress.     Appearance: She is well-developed. She is not toxic-appearing or diaphoretic.   Pulmonary:      Effort: Pulmonary effort is normal. No respiratory distress.      Breath sounds: Rales present. No wheezing or rhonchi.   Abdominal:      Tenderness: There is no abdominal tenderness. There is no guarding.   Musculoskeletal:      Right lower leg: No edema.      Left lower leg: No edema.   Skin:     Coloration: Skin is not jaundiced.   Neurological:      Mental Status: She is alert.      Cranial Nerves: Dysarthria present.      Motor: Weakness present. No seizure activity.   Psychiatric:         Attention and Perception: Attention normal.         Mood and Affect: Affect normal.         Behavior: Behavior is not agitated or aggressive.           Significant Labs: All pertinent labs within the past 24 hours have been reviewed.    Significant Imaging: I have reviewed all pertinent imaging results/findings within the past 24 hours.      Assessment/Plan:      * Acute encephalopathy  Neurology following. EEG showed encephalopathy. Facial fasciculations did not correlate with any seizure activity. Will get cerebral angiogram on 7/6/2023. Neuro  checks. Delirium precautions. Ears can be examined with an otoscope if her acute symptoms resolve and hearing remains abnormal.     7/7- stable, resolved. Keppra added per neurology for prevention.    History of ischemic left MCA stroke  History of left basal ganglia stroke on 7/27/2019 with right hemiparesis, spasticity, dysarthria, unsteady gait, chronic right-sided headaches,    MRI 6/29- Subcentimeter abnormal signal foci in the bilateral supratentorial white matter are nonspecific.  Given the lacunar infarct and history of intracranial atherosclerosis, these may be white matter microvascular ischemic changes are focal gliosis.  Other differential considerations include migraine headaches occurring at the time of the exam, demyelinating processes, Lyme disease, and vasculitides.  Otherwise, no acute intracranial process is identified.  2. Scattered chronic sinus disease. Similar appearance of chronic lacunar infarct in the left frontal periventricular white matter extending into the left lenticular nucleus.  Nighthawk clarification.    -Angiogram on 7/6 per IR: Prior described left distal M1/proximal M2 segment stenosis was noted and characterized - appears stable in severity compared to prior / with corresponding delayed rate of perfusion in the posterior frontal / parietal MCA territories - with collateralization through right A1-Acom-left PENELOPE complex.  Posterior circulation appears diminutive in caliber compared to standard norms - however stable in pattern compared to prior angio.     7/7 - STABLE         Hemiparesis of right dominant side as late effect of cerebral infarction  History of ischemic left MCA stroke  Bergman bergman disease  Chronic. Continue home aspirin, clopidrogel, and atorvastatin.  STABLE    Spasticity  Chronic. Stable. Continue home baclofen.    Bergman bergman disease  See above  Angiogram 7/6 done see above      Chronic right-sided headaches  Continue home meds      Physical deconditioning  Pt was  walking independently prior to admission.  Has chronic Post-stroke hemiparesis  Has been in bed several days  PT/OT eval  Up in chair.        VTE Risk Mitigation (From admission, onward)         Ordered     IP VTE LOW RISK PATIENT  Once         07/01/23 2246     Place sequential compression device  Until discontinued         07/01/23 2246                Discharge Planning   BLAS: 7/7/2023     Code Status: Full Code   Is the patient medically ready for discharge?: No    Reason for patient still in hospital (select all that apply): Pending disposition  Discharge Plan A: Home with family        Fidelia Ray MD  Department of Hospital Medicine   [facility] - Neurosurgery ([facility])

## 2023-07-07 NOTE — PROGRESS NOTES
"[facility] - Neurosurgery ([facility])  Neurology  Progress Note    Patient Name: Ally Ponce  MRN: [id number]  Admission Date: 7/1/2023  Hospital Length of Stay: 4 days  Code Status: Full Code   Attending Provider: Fidelia Ray MD  Primary Care Physician: Mariana Mendoza NP   Principal Problem:Acute encephalopathy    HPI:   Ms. Ponce is a 43 year old female pmh of L MCA stroke w/ residual hemiparesis, bergman bergman disease, recurrent falls, and headaches who presents with AMS. Initially presented to OSH on 7/1 and reported approximately 6 days of confusion and memory problems. Patient and friend report that symptoms started on Tuesday of last week. The patient does not recall most of the day Tuesday. Friend states that she mowed two lawns and then became very fatigued and out of it. She was also complaining of an upset stomach, lightheadedness, and dizziness. Denies syncope, vomiting or diarrhea. Since then the patient has not felt like herself. Feels like she is having "brain fog." Has been intermittently agitated and confused. Went to two ERs during this time but has not improved. Does not feel like she has returned to her baseline. She also has been having a headache on the R side of her head (states she hit her head on a car door on Monday) along with tinnitus and decreased hearing on the left. Patient reportedly had facial twitching while at an outside ED. The patient does not recall the twitching and the friend present was not there to see the twitching.  Outside workup was largely wnl. MRI done which showed stable appearing prior stroke along with subcentimeter abnormal signal foci in the bilateral supratentorial white matter. Patient transferred to [facility] for further eval.      Overview/Hospital Course:  No notes on file        Subjective:     Interval History: Got angiogram yesterday. Feeling well today from brain-fog standpoint. Still having whooshing in ears and dizziness.    Current Neurological " Medications: asa/plavix    Current Facility-Administered Medications   Medication Dose Route Frequency Provider Last Rate Last Admin    acetaminophen tablet 650 mg  650 mg Oral Q4H PRN Merced Luu PA-C   650 mg at 07/07/23 1057    aspirin EC tablet 325 mg  325 mg Oral Daily Merced Luu PA-C   325 mg at 07/07/23 0902    atorvastatin tablet 80 mg  80 mg Oral Daily Merced Luu PA-C   80 mg at 07/07/23 0902    baclofen tablet 10 mg  10 mg Oral BID Merced Luu PA-C   10 mg at 07/07/23 0902    bisacodyL suppository 10 mg  10 mg Rectal Daily PRN Merced Luu PA-C        clopidogreL tablet 75 mg  75 mg Oral Daily Merced Luu PA-C   75 mg at 07/07/23 0902    dextrose 10% bolus 125 mL 125 mL  12.5 g Intravenous PRN Merced Luu PA-C        dextrose 10% bolus 250 mL 250 mL  25 g Intravenous PRN Merced Luu PA-C        FLUoxetine capsule 20 mg  20 mg Oral QHS Merced Luu PA-C   20 mg at 07/06/23 2158    gabapentin capsule 100 mg  100 mg Oral Daily Merced Luu PA-C   100 mg at 07/05/23 1716    glucagon (human recombinant) injection 1 mg  1 mg Intramuscular PRN Merced Luu PA-C        glucose chewable tablet 16 g  16 g Oral PRN Merced Luu PA-C        glucose chewable tablet 24 g  24 g Oral PRN Merced Luu PA-C        levETIRAcetam tablet 500 mg  500 mg Oral BID Fidelia Ray MD   500 mg at 07/07/23 1052    melatonin tablet 6 mg  6 mg Oral Nightly PRN Merced Luu PA-C   6 mg at 07/06/23 2319    montelukast tablet 10 mg  10 mg Oral QHS Merced Luu PA-C   10 mg at 07/06/23 2158    ondansetron disintegrating tablet 8 mg  8 mg Oral Q8H PRN Merced Luu PA-C   8 mg at 07/04/23 0923    polyethylene glycol packet 17 g  17 g Oral Daily PRN Merced Luu PA-C        promethazine tablet 25 mg  25 mg Oral Q6H PRN Merced Luu PA-C        sodium chloride 0.9% flush  10 mL  10 mL Intravenous PRN Allan Lee MD           Review of Systems   Constitutional:  Positive for fatigue. Negative for chills and fever.   Respiratory:  Negative for cough and shortness of breath.    Cardiovascular:  Negative for chest pain.   Gastrointestinal:  Positive for nausea. Negative for abdominal pain, diarrhea and vomiting.   Genitourinary:  Positive for decreased urine volume. Negative for dysuria.   Musculoskeletal:  Positive for gait problem. Negative for back pain.   Skin:  Negative for color change and rash.   Neurological:  Positive for dizziness, light-headedness and headaches. Negative for syncope.   Psychiatric/Behavioral:  Positive for behavioral problems and confusion.    Objective:     Vital Signs (Most Recent):  Temp: 97.3 °F (36.3 °C) (07/07/23 1520)  Pulse: 69 (07/07/23 1520)  Resp: 18 (07/07/23 1520)  BP: 107/65 (07/07/23 1520)  SpO2: 97 % (07/07/23 1520) Vital Signs (24h Range):  Temp:  [97.3 °F (36.3 °C)-98.5 °F (36.9 °C)] 97.3 °F (36.3 °C)  Pulse:  [56-77] 69  Resp:  [9-22] 18  SpO2:  [94 %-99 %] 97 %  BP: ()/(61-73) 107/65        There is no height or weight on file to calculate BMI.     Physical Exam  Constitutional:       General: She is not in acute distress.  Pulmonary:      Effort: Pulmonary effort is normal. No respiratory distress.   Abdominal:      General: Abdomen is flat. There is no distension.   Musculoskeletal:         General: No swelling. Normal range of motion.   Neurological:      Mental Status: She is alert and oriented to person, place, and time.      Cranial Nerves: Cranial nerves 2-12 are intact.      Coordination: Finger-Nose-Finger Test normal.      Comments: Rhythmic contraction of chin/mentalis   Psychiatric:         Mood and Affect: Mood normal.         Behavior: Behavior normal.        NEUROLOGICAL EXAMINATION:     MENTAL STATUS   Oriented to person, place, and time.   Attention: normal. Concentration: normal.   Level of consciousness:  "alert    CRANIAL NERVES   Cranial nerves II through XII intact.     MOTOR EXAM     Strength   Strength 5/5 except as noted.        Strength 4/5 in BLE flexion/extension  5/5 dorsiflexion/plantar flexion       REFLEXES     Reflexes   Reflexes 2+ except as noted.     SENSORY EXAM   Light touch normal.     GAIT AND COORDINATION      Coordination   Finger to nose coordination: normal    Tremor   Resting tremor: absent    Significant Labs: CBC:   No results for input(s): WBC, HGB, HCT, PLT in the last 48 hours.    CMP:   No results for input(s): GLU, NA, K, CL, CO2, BUN, CREATININE, CALCIUM, MG, PROT, ALBUMIN, BILITOT, ALKPHOS, AST, ALT, ANIONGAP, EGFRNONAA in the last 48 hours.      Significant Imaging: I have reviewed all pertinent imaging results/findings within the past 24 hours.    Assessment and Plan:     * Acute encephalopathy  Patient is a 43 year old female hx of Leslie Leslie, chronic FOSTER who presents with altered mental status. Was mowing grass in the heat on Tuesday and has felt fatigued and "off" ever since then. Was noted to have facial twitching by family member. Continues to have rhythmic twitching of her mentalis muscle on exam today. MRI Brain w/o acute changes. MRA from April of this year with focal moderate to severe stenosis in the distal left MCA at the level of the MCA bifurcation with additional stenoses/irregularity extending to the proximal M2 branches. Consider complication of Leslie Leslie versus seizure (underlying structural abnormalities, recent heat exposure, ? Recent GI illness).    EEG done and reviewed with epilepsy staff. No epileptiform activity. Showed mild diffuse encephalopathy. No epileptic component of chin movements.  Possible that chin movements are related to movement d/o as chin movements not occurring during sleep per EEG review. This could correlate w/ Leslie Leslie involvement of basal ganglia.   Seems that encephalopathy has improved. Still has dizziness and whooshing in ear. " Occasional chin movements.    Recommendations:  -Repeat cerebral angiogram was done; relatively stable from prior  -Start keppra 500 BID  -f/u with neurology outpatient  -recommend f/u w/ ENT for ear issues  -Neurology will sign off, please call with any questions          VTE Risk Mitigation (From admission, onward)         Ordered     IP VTE LOW RISK PATIENT  Once         07/01/23 2246     Place sequential compression device  Until discontinued         07/01/23 2246                Aashish Ordonez MD  Neurology  [facility] - Neurosurgery ([facility])

## 2023-07-07 NOTE — ASSESSMENT & PLAN NOTE
Pt was walking independently prior to admission.  Has chronic Post-stroke hemiparesis  Has been in bed several days  PT/OT eval  Up in chair.

## 2023-07-07 NOTE — ASSESSMENT & PLAN NOTE
History of left basal ganglia stroke on 7/27/2019 with right hemiparesis, spasticity, dysarthria, unsteady gait, chronic right-sided headaches,    MRI 6/29- Subcentimeter abnormal signal foci in the bilateral supratentorial white matter are nonspecific.  Given the lacunar infarct and history of intracranial atherosclerosis, these may be white matter microvascular ischemic changes are focal gliosis.  Other differential considerations include migraine headaches occurring at the time of the exam, demyelinating processes, Lyme disease, and vasculitides.  Otherwise, no acute intracranial process is identified.  2. Scattered chronic sinus disease. Similar appearance of chronic lacunar infarct in the left frontal periventricular white matter extending into the left lenticular nucleus.  Nighthawk clarification.    -Angiogram on 7/6 per IR: Prior described left distal M1/proximal M2 segment stenosis was noted and characterized - appears stable in severity compared to prior / with corresponding delayed rate of perfusion in the posterior frontal / parietal MCA territories - with collateralization through right A1-Acom-left PENELOPE complex.  Posterior circulation appears diminutive in caliber compared to standard norms - however stable in pattern compared to prior angio.     7/7 - STABLE

## 2023-07-07 NOTE — SUBJECTIVE & OBJECTIVE
Interval History:see above    Review of Systems   Constitutional:  Negative for chills and fever.   HENT:  Positive for hearing loss.    Respiratory:  Negative for cough, choking and shortness of breath.    Gastrointestinal:  Negative for abdominal pain, constipation, diarrhea, nausea and vomiting.   Neurological:  Positive for weakness and headaches. Negative for syncope.   Psychiatric/Behavioral:  Negative for behavioral problems and confusion.    Objective:     Vital Signs (Most Recent):  Temp: 97.5 °F (36.4 °C) (07/07/23 0533)  Pulse: 62 (07/07/23 0533)  Resp: 18 (07/07/23 0533)  BP: 97/62 (07/07/23 0533)  SpO2: 95 % (07/07/23 0533) Vital Signs (24h Range):  Temp:  [96.5 °F (35.8 °C)-98.5 °F (36.9 °C)] 97.5 °F (36.4 °C)  Pulse:  [56-67] 62  Resp:  [9-20] 18  SpO2:  [94 %-100 %] 95 %  BP: ()/(61-76) 97/62        There is no height or weight on file to calculate BMI.    Intake/Output Summary (Last 24 hours) at 7/7/2023 0753  Last data filed at 7/6/2023 2245  Gross per 24 hour   Intake --   Output 1600 ml   Net -1600 ml           Physical Exam  Vitals and nursing note reviewed.   Constitutional:       General: She is not in acute distress.     Appearance: She is well-developed. She is not toxic-appearing or diaphoretic.   Pulmonary:      Effort: Pulmonary effort is normal. No respiratory distress.      Breath sounds: Rales present. No wheezing or rhonchi.   Abdominal:      Tenderness: There is no abdominal tenderness. There is no guarding.   Musculoskeletal:      Right lower leg: No edema.      Left lower leg: No edema.   Skin:     Coloration: Skin is not jaundiced.   Neurological:      Mental Status: She is alert.      Cranial Nerves: Dysarthria present.      Motor: Weakness present. No seizure activity.   Psychiatric:         Attention and Perception: Attention normal.         Mood and Affect: Affect normal.         Behavior: Behavior is not agitated or aggressive.           Significant Labs: All pertinent  labs within the past 24 hours have been reviewed.    Significant Imaging: I have reviewed all pertinent imaging results/findings within the past 24 hours.

## 2023-07-07 NOTE — SUBJECTIVE & OBJECTIVE
Subjective:     Interval History: Got angiogram yesterday. Feeling well today from brain-fog standpoint. Still having whooshing in ears and dizziness.    Current Neurological Medications: asa/plavix    Current Facility-Administered Medications   Medication Dose Route Frequency Provider Last Rate Last Admin    acetaminophen tablet 650 mg  650 mg Oral Q4H PRN Merced Luu PA-C   650 mg at 07/07/23 1057    aspirin EC tablet 325 mg  325 mg Oral Daily Merced Luu PA-C   325 mg at 07/07/23 0902    atorvastatin tablet 80 mg  80 mg Oral Daily Merced Luu PA-C   80 mg at 07/07/23 0902    baclofen tablet 10 mg  10 mg Oral BID Mecred Luu PA-C   10 mg at 07/07/23 0902    bisacodyL suppository 10 mg  10 mg Rectal Daily PRN Merced uLu PA-C        clopidogreL tablet 75 mg  75 mg Oral Daily Merced Luu PA-C   75 mg at 07/07/23 0902    dextrose 10% bolus 125 mL 125 mL  12.5 g Intravenous PRN Merced Luu PA-C        dextrose 10% bolus 250 mL 250 mL  25 g Intravenous PRN Merced Luu PA-C        FLUoxetine capsule 20 mg  20 mg Oral QHS Merced Luu PA-C   20 mg at 07/06/23 2158    gabapentin capsule 100 mg  100 mg Oral Daily Merced Luu PA-C   100 mg at 07/05/23 1716    glucagon (human recombinant) injection 1 mg  1 mg Intramuscular PRN Merced Luu PA-C        glucose chewable tablet 16 g  16 g Oral PRN Merced Luu PA-C        glucose chewable tablet 24 g  24 g Oral PRN Merced Luu PA-C        levETIRAcetam tablet 500 mg  500 mg Oral BID Fidelia Ray MD   500 mg at 07/07/23 1052    melatonin tablet 6 mg  6 mg Oral Nightly PRN Merced Luu PA-C   6 mg at 07/06/23 2319    montelukast tablet 10 mg  10 mg Oral QHS Merced Luu PA-C   10 mg at 07/06/23 2158    ondansetron disintegrating tablet 8 mg  8 mg Oral Q8H PRN Merced Luu PA-C   8 mg at 07/04/23 0923    polyethylene glycol packet 17 g  17  g Oral Daily PRN Merced Luu PA-C        promethazine tablet 25 mg  25 mg Oral Q6H PRN Merced Luu PA-C        sodium chloride 0.9% flush 10 mL  10 mL Intravenous PRN Allan Lee MD           Review of Systems   Constitutional:  Positive for fatigue. Negative for chills and fever.   Respiratory:  Negative for cough and shortness of breath.    Cardiovascular:  Negative for chest pain.   Gastrointestinal:  Positive for nausea. Negative for abdominal pain, diarrhea and vomiting.   Genitourinary:  Positive for decreased urine volume. Negative for dysuria.   Musculoskeletal:  Positive for gait problem. Negative for back pain.   Skin:  Negative for color change and rash.   Neurological:  Positive for dizziness, light-headedness and headaches. Negative for syncope.   Psychiatric/Behavioral:  Positive for behavioral problems and confusion.    Objective:     Vital Signs (Most Recent):  Temp: 97.3 °F (36.3 °C) (07/07/23 1520)  Pulse: 69 (07/07/23 1520)  Resp: 18 (07/07/23 1520)  BP: 107/65 (07/07/23 1520)  SpO2: 97 % (07/07/23 1520) Vital Signs (24h Range):  Temp:  [97.3 °F (36.3 °C)-98.5 °F (36.9 °C)] 97.3 °F (36.3 °C)  Pulse:  [56-77] 69  Resp:  [9-22] 18  SpO2:  [94 %-99 %] 97 %  BP: ()/(61-73) 107/65        There is no height or weight on file to calculate BMI.     Physical Exam  Constitutional:       General: She is not in acute distress.  Pulmonary:      Effort: Pulmonary effort is normal. No respiratory distress.   Abdominal:      General: Abdomen is flat. There is no distension.   Musculoskeletal:         General: No swelling. Normal range of motion.   Neurological:      Mental Status: She is alert and oriented to person, place, and time.      Cranial Nerves: Cranial nerves 2-12 are intact.      Coordination: Finger-Nose-Finger Test normal.      Comments: Rhythmic contraction of chin/mentalis   Psychiatric:         Mood and Affect: Mood normal.         Behavior: Behavior normal.         NEUROLOGICAL EXAMINATION:     MENTAL STATUS   Oriented to person, place, and time.   Attention: normal. Concentration: normal.   Level of consciousness: alert    CRANIAL NERVES   Cranial nerves II through XII intact.     MOTOR EXAM     Strength   Strength 5/5 except as noted.        Strength 4/5 in BLE flexion/extension  5/5 dorsiflexion/plantar flexion       REFLEXES     Reflexes   Reflexes 2+ except as noted.     SENSORY EXAM   Light touch normal.     GAIT AND COORDINATION      Coordination   Finger to nose coordination: normal    Tremor   Resting tremor: absent    Significant Labs: CBC:   No results for input(s): WBC, HGB, HCT, PLT in the last 48 hours.    CMP:   No results for input(s): GLU, NA, K, CL, CO2, BUN, CREATININE, CALCIUM, MG, PROT, ALBUMIN, BILITOT, ALKPHOS, AST, ALT, ANIONGAP, EGFRNONAA in the last 48 hours.      Significant Imaging: I have reviewed all pertinent imaging results/findings within the past 24 hours.

## 2023-07-07 NOTE — ASSESSMENT & PLAN NOTE
"Patient is a 43 year old female hx of Leslie Leslie, chronic FOSTER who presents with altered mental status. Was mowing grass in the heat on Tuesday and has felt fatigued and "off" ever since then. Was noted to have facial twitching by family member. Continues to have rhythmic twitching of her mentalis muscle on exam today. MRI Brain w/o acute changes. MRA from April of this year with focal moderate to severe stenosis in the distal left MCA at the level of the MCA bifurcation with additional stenoses/irregularity extending to the proximal M2 branches. Consider complication of Leslie Leslie versus seizure (underlying structural abnormalities, recent heat exposure, ? Recent GI illness).    EEG done and reviewed with epilepsy staff. No epileptiform activity. Showed mild diffuse encephalopathy. No epileptic component of chin movements.  Possible that chin movements are related to movement d/o as chin movements not occurring during sleep per EEG review. This could correlate w/ Leslie Leslie involvement of basal ganglia.   Seems that encephalopathy has improved. Still has dizziness and whooshing in ear. Occasional chin movements.    Recommendations:  -Repeat cerebral angiogram was done; relatively stable from prior  -Start keppra 500 BID  -f/u with neurology outpatient  -recommend f/u w/ ENT for ear issues  -Neurology will sign off, please call with any questions    "

## 2023-07-07 NOTE — ASSESSMENT & PLAN NOTE
History of ischemic left MCA stroke  Bergman bergman disease  Chronic. Continue home aspirin, clopidrogel, and atorvastatin.  STABLE

## 2023-07-07 NOTE — ASSESSMENT & PLAN NOTE
Neurology following. EEG showed encephalopathy. Facial fasciculations did not correlate with any seizure activity. Will get cerebral angiogram on 7/6/2023. Neuro checks. Delirium precautions. Ears can be examined with an otoscope if her acute symptoms resolve and hearing remains abnormal.     7/7- stable, resolved. Keppra added per neurology for prevention.

## 2023-07-08 VITALS
RESPIRATION RATE: 18 BRPM | HEART RATE: 60 BPM | SYSTOLIC BLOOD PRESSURE: 102 MMHG | DIASTOLIC BLOOD PRESSURE: 67 MMHG | OXYGEN SATURATION: 98 % | TEMPERATURE: 97 F

## 2023-07-08 PROCEDURE — 99239 HOSP IP/OBS DSCHRG MGMT >30: CPT | Mod: ,,, | Performed by: HOSPITALIST

## 2023-07-08 PROCEDURE — 97161 PT EVAL LOW COMPLEX 20 MIN: CPT

## 2023-07-08 PROCEDURE — 25000003 PHARM REV CODE 250: Performed by: HOSPITALIST

## 2023-07-08 PROCEDURE — 99239 PR HOSPITAL DISCHARGE DAY,>30 MIN: ICD-10-PCS | Mod: ,,, | Performed by: HOSPITALIST

## 2023-07-08 PROCEDURE — 25000003 PHARM REV CODE 250: Performed by: PHYSICIAN ASSISTANT

## 2023-07-08 PROCEDURE — 97116 GAIT TRAINING THERAPY: CPT

## 2023-07-08 RX ADMIN — LEVETIRACETAM 500 MG: 500 TABLET, FILM COATED ORAL at 09:07

## 2023-07-08 RX ADMIN — BACLOFEN 10 MG: 10 TABLET ORAL at 09:07

## 2023-07-08 RX ADMIN — ASPIRIN 325 MG: 325 TABLET, COATED ORAL at 09:07

## 2023-07-08 RX ADMIN — CLOPIDOGREL BISULFATE 75 MG: 75 TABLET ORAL at 09:07

## 2023-07-08 RX ADMIN — ATORVASTATIN CALCIUM 80 MG: 40 TABLET, FILM COATED ORAL at 09:07

## 2023-07-08 NOTE — PLAN OF CARE
Patient laying in bed resting comfortably at this time with no complaints. She just worked with PT and is stating she is ready for discharge. Vital signs are stable. Safety measures in place and call bell within reach.     Problem: Adult Inpatient Plan of Care  Goal: Plan of Care Review  Outcome: Ongoing, Progressing  Goal: Patient-Specific Goal (Individualized)  Outcome: Ongoing, Progressing  Goal: Absence of Hospital-Acquired Illness or Injury  Outcome: Ongoing, Progressing  Goal: Optimal Comfort and Wellbeing  Outcome: Ongoing, Progressing  Goal: Readiness for Transition of Care  Outcome: Ongoing, Progressing     Problem: Infection  Goal: Absence of Infection Signs and Symptoms  Outcome: Ongoing, Progressing

## 2023-07-08 NOTE — PT/OT/SLP EVAL
Physical Therapy Evaluation    Patient Name:  Ally Ponce   MRN:  [id number]  Admit Date: 2023  Admitting Diagnosis:  Acute encephalopathy   Length of Stay: 5 days  Recent Surgery: Procedure(s) (LRB):  ANGIOGRAM-CEREBRAL (N/A)      Recommendations:     Discharge Recommendations:  other (see comments)   Discharge Equipment Recommendations: walker, rolling   Barriers to discharge:  Decreased gait stability, Decreased balance, Decreased activity tolerance    Plan:     During this hospitalization, patient to be seen 3 x/week to address the listed problems via gait training, therapeutic activities, therapeutic exercises, neuromuscular re-education  Plan of Care Expires:  23  Plan of Care Reviewed with: patient, family    Assessment:     Ally Ponce is a 43 y.o. female admitted with a medical diagnosis of Acute encephalopathy. Patient found alert and cooperative. Patient limited by R sided weakness and generalized fatigue. VSS throughout evaluation. Patient demonstrates good functional strength; however, her R LE is significantly weaker than her L LE. She ambulates 300 ft with CGA and RW; however, she demonstrates slightly unsteady gait characterized by decreased step length and L lateral lean during ambulation. Plan to continue gait training and stair training activities to increase functional strength and improve mobility and safety awareness.      Problem List: weakness, impaired endurance, impaired functional mobility, impaired balance, gait instability, decreased lower extremity function, decreased safety awareness.  Rehab Prognosis: Good     GOALS:   Multidisciplinary Problems       Physical Therapy Goals          Problem: Physical Therapy    Goal Priority Disciplines Outcome Goal Variances Interventions   Physical Therapy Goal     PT, PT/OT Ongoing, Progressing     Description: Goals to be met by: 23     Patient will increase functional independence with mobility by performin. Sit to stand  "transfer with Supervision  2. Gait  x 300 feet with Supervision using LRAD.   3. Ascend/descend 5 stair with bilateral Handrails Supervision using No Assistive Device.   4. Stand for 5 minutes with Supervision using LRAD                         Subjective   Communicated with RN prior to session.  Patient found ambulatory in room/cedeno upon PT entry to room, agreeable to evaluation. Ally Ponce's sister present during session.    Chief Complaint: N/a  Patient/Family Comments/goals: Improve mobility and return home  Pain/Comfort:  Pain Rating 1: 0/10  Pain Rating Post-Intervention 1: 0/10    Patient lives with mother in a camper (3 JESSE, L HR); however, they are about to move to a trailer (5 [address], plan to build a ramp or handrails). Patient reports that she's IND with mobility and ADLs and uses no AD for ambulation. Patient has a tub/shower and does not use a SC or tub bench. Patient owns no DME. Patient does drive and has "a lot of sisters" that can assist her as needed.     Objective:   Patient found with: peripheral IV   General Precautions: Standard, Cardiac fall   Orthopedic Precautions:    Braces:     Oxygen Device: Room Air  Vitals: /67 (BP Location: Left arm, Patient Position: Lying)   Pulse 60   Temp 96.7 °F (35.9 °C) (Oral)   Resp 18   LMP 10/31/2021   SpO2 98%   Breastfeeding No     Outcome Measures:  AM-PAC 6 CLICK MOBILITY  Turning over in bed (including adjusting bedclothes, sheets and blankets)?: 3  Sitting down on and standing up from a chair with arms (e.g., wheelchair, bedside commode, etc.): 3  Moving from lying on back to sitting on the side of the bed?: 3  Moving to and from a bed to a chair (including a wheelchair)?: 3  Need to walk in hospital room?: 3  Climbing 3-5 steps with a railing?: 2  Basic Mobility Total Score: 17     Cognition:   Alert and Cooperative  AxOx4  Command following: Follows multistep  commands  Fluency: dysarthria    MMT:  RLE ROM: WFL  RLE Strength: Hip and knee " "3+/5, Ankle 2+/5  LLE ROM: WFL  LLE Strength: WFL      Functional Mobility:  Additional staff present: Supervising PT    Sitting Balance at Edge of Chair:  Assistance Level Required: Heard  Time: 2 minutes  Postural deviations noted: no deviations noted    Transfers:   Sit <> Stand Transfer (from chair): contact guard assistance and minimum assistance (static standing) with quad cane   Patient demonstrates good functional strength; however, she experiences instability upon rising from sit>stand.   No LOB occurred and PT provided Min-A to help patient maintain stability      Gait:  Patient ambulated: 300 ft    Patient required: contact guard  Patient used: rolling walker  Gait Pattern observed: reciprocal gait  Gait Deviation(s): unsteady gait, decreased step length, decreased abiola, and increased time in stance phase on unaffected leg  Impairments due to: impaired balance, decreased strength, and decreased endurance  Comments:   Patient demonstrates fair functional mobility and demonstrates a L lateral lean during ambulation (Patient's R side is weaker than L)  Patient demonstrates improved mobility with the RW as opposed to the quad cane and demonstrates more stability during ambulation with the RW  Patient reports no fatigue or dizziness up exertion and reports that her R leg feels "weak" throughout ambulation activities    Therapeutic Activities, Exercises, and Education:   Patient Education on PT role and purpose  Patient Education on importance of mobility  Patient Education on gait recommendations (increase step length)  Patient verbalized understanding    Patient left up in chair with all lines intact, call button in reach, and sister present..    Time Tracking:     PT Received On: 07/08/23  PT Start Time: 0919     PT Stop Time: 0944  PT Total Time (min): 25 min       Billable Minutes:   Evaluation 15 and Gait Training 10    Treatment Type: Evaluation  PT/PTA: PT               "

## 2023-07-08 NOTE — DISCHARGE SUMMARY
Jay Brennan - Neurosurgery ([facility])  [facility] Medicine  Discharge Summary      Patient Name: Ally Ponce  MRN: [id number]  [facility]: [id number]  Patient Class: IP- Inpatient  Admission Date: 7/1/2023  Hospital Length of Stay: 5 days  Discharge Date and Time: No discharge date for patient encounter.  Attending Physician: Fidelia Ray MD   Discharging Provider: Fidelia Ray MD  Primary Care Provider: Mariana Mendoza NP  [facility] Medicine Team: [facility] HOSP MED N Fidelia Ray MD  Primary Care Team: [facility] MED N    HPI:   Ally Ponce is a 43 year old white woman with Bergman bergman disease, history of left basal ganglia stroke on 7/27/2019 with right hemiparesis, spasticity, dysarthria, unsteady gait, chronic right-sided headaches, hyperlipidemia. She lives in [place], [place]. She is legally . Her neurologist is Dr. Caridad Benjamin.   She was seen at [facility] Emergency Department on 6/28/2023 for slurred speech and mild headaches since 6/25/2023 and intermittent confusion, memory loss, facial twitching, and worsening right lower extremity weakness causing dragging of her right leg since 6/26/2023. Brain MRI showed nonspecific subcentimeter abnormal signal foci in the bilateral supratentorial white matter with a differential of microvascular ischemic changes, focal gliosis, migraine headaches, demyelinating processes, Lyme disease, and vasculitis.    Symptoms persisted. Her sister informed Dr. Benjamin's clinic on 6/29/2023.    She was admitted to [facility] on 7/1/2023 with persistent symptoms, as well as facial fasciculations. On physical examination, she had intermittent rhythmic contractions of the chin and fasciculations of the left lower eyelid with nystagmus. She was awake and interactive while this happened. She had no visual field deficit. She had stable chronic dysarthria. Head CT showed nothing acute. Neurology was contacted at [facility] and transfer was recommended for Neurology evaluation and one possible continuous electroencephalogram. Upon arrival to [facility], she was admitted to Hospital Medicine Team N. Symptoms had reportedly improved since she was admitted to [facility], but she continued to have delayed speech and it took her longer to think of what she wanted to say. Her dysarthria and right hemiparesis were at baseline.       Procedure(s) (LRB):  ANGIOGRAM-CEREBRAL (N/A)      Hospital Course:   EEG showed mild diffuse encephalopathy. She had rhythmic chin movements which were not associated with epileptic activity on EEG. She reported that she had been having decreased hearing that went along with her other recent symptoms but her ears were not examined with an otoscope during her recent emergency department visits. She was scheduled for cerebral angiogram by Interventional Radiology on 7/6/2023.     Interval History:  7/6- /66 VSS. 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 NPO. No new lab.   - Severe numbness right hand, able to lift knees off bed, difficulty w dorsiflexion of the feet    7/7 - cardiac diet, chat w neurology about AEDs.   Cerebral angiogram: Prior described left distal M1/proximal M2 segment stenosis was noted and characterized - appears stable in severity compared to prior / with corresponding delayed rate of perfusion in the posterior frontal / parietal MCA territories - with collateralization through right A1-Acom-left PENELOPE complex.Posterior circulation appears diminutive in caliber compared to standard norms - however stable in pattern compared to prior angio.   - Keppra started   - pt afraid to get out of bed and family wants PT eval. PT/OT consulted, nurse to assist up to chair.     7/8- walked down halls w PT this am. Will dc to home.        Goals of Care Treatment Preferences:  Code Status: Full Code      Consults:   Consults (From  admission, onward)        Status Ordering Provider     Inpatient consult to Interventional Radiology  Once        Provider:  (Not yet assigned)    Completed GUADALUPE ALBERTS     Inpatient consult to Neurology  Once        Provider:  (Not yet assigned)    Completed MUSHTAQ ONOFRE          Neuro  * Acute encephalopathy  Neurology following. EEG showed encephalopathy. Facial fasciculations did not correlate with any seizure activity. Will get cerebral angiogram on 7/6/2023. Neuro checks. Delirium precautions. Ears can be examined with an otoscope if her acute symptoms resolve and hearing remains abnormal.     7/7- stable, resolved. Keppra added per neurology for prevention.    Chronic right-sided headaches  Continue home meds      Ebrgman bergman disease  See above  Angiogram 7/6 done see above      Hemiparesis of right dominant side as late effect of cerebral infarction  History of ischemic left MCA stroke  Bergman bergman disease  Chronic. Continue home aspirin, clopidrogel, and atorvastatin.  STABLE    History of ischemic left MCA stroke  History of left basal ganglia stroke on 7/27/2019 with right hemiparesis, spasticity, dysarthria, unsteady gait, chronic right-sided headaches,    MRI 6/29- Subcentimeter abnormal signal foci in the bilateral supratentorial white matter are nonspecific.  Given the lacunar infarct and history of intracranial atherosclerosis, these may be white matter microvascular ischemic changes are focal gliosis.  Other differential considerations include migraine headaches occurring at the time of the exam, demyelinating processes, Lyme disease, and vasculitides.  Otherwise, no acute intracranial process is identified.  2. Scattered chronic sinus disease. Similar appearance of chronic lacunar infarct in the left frontal periventricular white matter extending into the left lenticular nucleus.  [place] clarification.    -Angiogram on 7/6 per IR: Prior described left distal M1/proximal M2 segment  "stenosis was noted and characterized - appears stable in severity compared to prior / with corresponding delayed rate of perfusion in the posterior frontal / parietal MCA territories - with collateralization through right A1-Acom-left PENELOPE complex.  Posterior circulation appears diminutive in caliber compared to standard norms - however stable in pattern compared to prior angio.     7/7 - STABLE         Other  Physical deconditioning  Pt was walking independently prior to admission.  Has chronic Post-stroke hemiparesis  Has been in bed several days  PT/OT eval  Up in chair.        Spasticity  Chronic. Stable. Continue home baclofen.      Final Active Diagnoses:    Diagnosis Date Noted POA    PRINCIPAL PROBLEM:  Acute encephalopathy [G93.40] 07/01/2023 Yes    History of ischemic left MCA stroke [Z86.73] 09/04/2019 Not Applicable     Chronic    Hemiparesis of right dominant side as late effect of cerebral infarction [I69.351] 09/04/2019 Not Applicable     Chronic    Spasticity [R25.2] 07/27/2019 Yes     Chronic    Bergman bergman disease [I67.5] 09/04/2019 Yes     Chronic    Chronic right-sided headaches [R51.9, G89.29] 06/16/2022 Yes     Chronic    Physical deconditioning [R53.81] 07/07/2023 No      Problems Resolved During this Admission:       Discharged Condition: good    Disposition: Home or Self Care    Follow Up:   Follow-up Information     Mariana Mendoza NP. Go on 7/12/2023.    Specialty: Family Medicine  Why: Hospital follow up 7/12 at 9:30am  Contact information:  [facility] [facility] [address]  [phone]                       Patient Instructions:      WALKER FOR HOME USE     Order Specific Question Answer Comments   Type of Walker: Adult (5'4"-6'6")    With wheels? Yes    Height: 160 cm    Weight: 152 #    Length of need (1-99 months): 99    Does patient have medical equipment at home? none    Please check all that apply: Patient's condition impairs ambulation.  "     Ambulatory referral/consult to Neurology   Standing Status: Future   Referral Priority: Routine Referral Type: Consultation   Referral Reason: Specialty Services Required   Requested Specialty: Neurology   Number of Visits Requested: 1       Significant Diagnostic Studies: Labs: CMP No results for input(s): NA, K, CL, CO2, GLU, BUN, CREATININE, CALCIUM, PROT, ALBUMIN, BILITOT, ALKPHOS, AST, ALT, ANIONGAP, ESTGFRAFRICA, EGFRNONAA in the last 48 hours. and CBC No results for input(s): WBC, HGB, HCT, PLT in the last 48 hours.    Pending Diagnostic Studies:     Procedure Component Value Units Date/Time    IR Angiogram Cerebral Intracranial ea Add vessel [[id number]] Resulted: 07/06/23 1620    Order Status: Sent Lab Status: In process Updated: 07/06/23 1641    Vitamin B1 [[id number]] Collected: 07/02/23 0438    Order Status: Sent Lab Status: In process Updated: 07/02/23 0532    Specimen: Blood     Vitamin B1 [[id number]] Collected: 07/02/23 0438    Order Status: Sent Lab Status: In process Updated: 07/02/23 0532    Specimen: Blood          Medications:  Reconciled Home Medications:      Medication List      START taking these medications    acetaminophen 325 MG tablet  Commonly known as: TYLENOL  Take 2 tablets (650 mg total) by mouth every 4 (four) hours as needed.     bisacodyL 10 mg Supp  Commonly known as: DULCOLAX  Place 1 suppository (10 mg total) rectally daily as needed (Until bowel movement if patient has no bowel movement for 2 days).     levETIRAcetam 500 MG Tab  Commonly known as: KEPPRA  Take 1 tablet (500 mg total) by mouth 2 (two) times daily.        CHANGE how you take these medications    baclofen 10 MG tablet  Commonly known as: LIORESAL  Take 1 tablet (10 mg total) by mouth 2 (two) times daily.  What changed:   · how much to take  · when to take this  · reasons to take this        CONTINUE taking these medications    aspirin 325 MG EC tablet  Commonly known as: ECOTRIN  Take 1 tablet (325 mg total) by  mouth once daily.     atorvastatin 80 MG tablet  Commonly known as: LIPITOR  Take 1 tablet (80 mg total) by mouth once daily.     CALCIUM 300 ORAL  Take 50 mg by mouth Daily.     clopidogreL 75 mg tablet  Commonly known as: PLAVIX  Take 75 mg by mouth once daily.     cyanocobalamin (vitamin B-12) 50 mcg tablet  Take 50 mcg by mouth once daily.     FLUoxetine 20 MG capsule  Take 1 capsule (20 mg total) by mouth every evening.     fluticasone propionate 50 mcg/actuation nasal spray  Commonly known as: FLONASE  1 spray by Each Nostril route daily as needed.     gabapentin 100 MG capsule  Commonly known as: NEURONTIN  TAKE ONE CAPSULE BY MOUTH THREE TIMES DAILY     magnesium oxide 400 mg (241.3 mg magnesium) tablet  Commonly known as: MAG-OX  TAKE ONE TABLET BY MOUTH ONCE DAILY     montelukast 10 mg tablet  Commonly known as: SINGULAIR  Take 10 mg by mouth every evening.     multivitamin Tab  Take 1 tablet by mouth once daily.     ondansetron 4 MG tablet  Commonly known as: ZOFRAN  Take 1 tablet (4 mg total) by mouth every 8 (eight) hours as needed.     pantoprazole 40 MG tablet  Commonly known as: PROTONIX  Take 40 mg by mouth daily as needed.     polyethylene glycol 17 gram Pwpk  Commonly known as: GLYCOLAX  Take 17 g by mouth daily as needed (constipation).        STOP taking these medications    magnesium 250 mg Tab            Indwelling Lines/Drains at time of discharge:   Lines/Drains/Airways     Drain  Duration           Female External Urinary Catheter 06/30/23 2157 7 days                Time spent on the discharge of patient: 35 minutes         Fidelia Ray MD  Department of Hospital Medicine  [facility] - Neurosurgery ([facility])

## 2023-07-08 NOTE — PLAN OF CARE
Neurology referral noted on AVS. PCP appointment noted on AVS. Mom at bedside and will provide transportation home. Secure chat sent to MD to add outpatient therapy orders.   07/08/23 1203   Final Note   Assessment Type Final Discharge Note   Anticipated Discharge Disposition Home   Hospital Resources/Appts/Education Provided Provided patient/caregiver with written discharge plan information;Appointments scheduled and added to AVS   Post-Acute Status   Discharge Delays None known at this time     [facility] - Neurosurgery (Hospital)  Discharge Final Note    Primary Care Provider: Mariana Mendoza NP    Expected Discharge Date: 7/8/2023    Final Discharge Note (most recent)       Final Note - 07/08/23 1203          Final Note    Assessment Type Final Discharge Note (P)      Anticipated Discharge Disposition Home or Self Care (P)      Hospital Resources/Appts/Education Provided Provided patient/caregiver with written discharge plan information;Appointments scheduled and added to AVS (P)         Post-Acute Status    Discharge Delays None known at this time (P)                      Important Message from Medicare             Contact Info       Mariana Mendoza NP   Specialty: Family Medicine   Relationship: PCP - General    [address] [facility] [address]   Phone: [phone]       Next Steps: Go on 7/12/2023    Instructions: Hospital follow up 7/12 at 9:30am

## 2023-07-08 NOTE — PLAN OF CARE
Problem: Physical Therapy  Goal: Physical Therapy Goal  Description: Goals to be met by: 23     Patient will increase functional independence with mobility by performin. Sit to stand transfer with Supervision  2. Gait  x 300 feet with Supervision using LRAD.   3. Ascend/descend 5 stair with bilateral Handrails Supervision using No Assistive Device.   4. Stand for 5 minutes with Supervision using LRAD    Outcome: Ongoing, Progressing     Evaluation completed. Goals appropriate.

## 2023-07-09 LAB
VIT B1 BLD-MCNC: 87 UG/L (ref 38–122)
VIT B1 BLD-MCNC: 87 UG/L (ref 38–122)

## 2023-07-10 ENCOUNTER — PATIENT OUTREACH (OUTPATIENT)
Dept: ADMINISTRATIVE | Facility: CLINIC | Age: 43
End: 2023-07-10
Payer: MEDICAID

## 2023-07-10 NOTE — PROGRESS NOTES
C3 nurse spoke with Ally Ponce  for a TCC post hospital discharge follow up call. The patient has a scheduled [facility] appointment with Mariana Mendoza NP (Family Medicine) on 7/12/2023 at 9:30 am.

## 2023-08-22 ENCOUNTER — TELEPHONE (OUTPATIENT)
Dept: NEUROSURGERY | Facility: CLINIC | Age: 43
End: 2023-08-22
Payer: MEDICAID

## 2023-08-22 NOTE — TELEPHONE ENCOUNTER
----- Message from Carmen Franks MA sent at 8/22/2023 11:07 AM CDT -----  Regarding: FW: Hospital Discharge, pt needs information about how to follow up in regards to hospital visit, has referral in  Contact: @[phone]    ----- Message -----  From: Marleni Hermosillo  Sent: 8/22/2023   8:43 AM CDT  To: #  Subject: Hospital Discharge, pt needs information abo#    Hospital Discharge, pt needs information about how to follow up in regards to hospital visit, has referral in,    Please call patient back and advise. @[phone] Levels were low in relation to seizure medicines as told by PCP.

## 2023-08-22 NOTE — TELEPHONE ENCOUNTER
Returned call to pt. Appt scheduled. Instructed needs to notify neurologist who is handling seizure medication. Pt verbalized understanding.

## 2023-08-22 NOTE — TELEPHONE ENCOUNTER
----- Message from Marleni Hermosillo sent at 8/22/2023  8:36 AM CDT -----  Regarding: Hospital Discharge, pt needs information about how to follow up in regards to hospital visit, has referral in  Contact: @[phone]  Hospital Discharge, pt needs information about how to follow up in regards to hospital visit, has referral in,    Please call patient back and advise. @[phone] Levels were low in relation to seizure medicines as told by PCP.

## 2023-08-24 ENCOUNTER — PATIENT MESSAGE (OUTPATIENT)
Dept: NEUROLOGY | Facility: CLINIC | Age: 43
End: 2023-08-24
Payer: MEDICAID

## 2023-09-19 ENCOUNTER — OFFICE VISIT (OUTPATIENT)
Dept: NEUROSURGERY | Facility: CLINIC | Age: 43
End: 2023-09-19
Payer: MEDICAID

## 2023-09-19 ENCOUNTER — TELEPHONE (OUTPATIENT)
Dept: NEUROLOGY | Facility: CLINIC | Age: 43
End: 2023-09-19
Payer: MEDICAID

## 2023-09-19 DIAGNOSIS — R29.818 OTHER SYMPTOMS AND SIGNS INVOLVING THE NERVOUS SYSTEM: ICD-10-CM

## 2023-09-19 DIAGNOSIS — Z86.73 HISTORY OF ISCHEMIC LEFT MCA STROKE: Chronic | ICD-10-CM

## 2023-09-19 DIAGNOSIS — I67.5 MOYA MOYA DISEASE: Primary | Chronic | ICD-10-CM

## 2023-09-19 PROCEDURE — 3044F PR MOST RECENT HEMOGLOBIN A1C LEVEL <7.0%: ICD-10-PCS | Mod: CPTII,95,, | Performed by: PHYSICIAN ASSISTANT

## 2023-09-19 PROCEDURE — 99417 PROLNG OP E/M EACH 15 MIN: CPT | Mod: 95,,, | Performed by: PHYSICIAN ASSISTANT

## 2023-09-19 PROCEDURE — 99215 PR OFFICE/OUTPT VISIT, EST, LEVL V, 40-54 MIN: ICD-10-PCS | Mod: 95,,, | Performed by: PHYSICIAN ASSISTANT

## 2023-09-19 PROCEDURE — 99215 OFFICE O/P EST HI 40 MIN: CPT | Mod: 95,,, | Performed by: PHYSICIAN ASSISTANT

## 2023-09-19 PROCEDURE — 3044F HG A1C LEVEL LT 7.0%: CPT | Mod: CPTII,95,, | Performed by: PHYSICIAN ASSISTANT

## 2023-09-19 PROCEDURE — 99417 PR PROLONGED SVC, OUTPT, W/WO DIRECT PT CONTACT,  EA ADDTL 15 MIN: ICD-10-PCS | Mod: 95,,, | Performed by: PHYSICIAN ASSISTANT

## 2023-09-19 NOTE — PROGRESS NOTES
The patient location is: [place]  The chief complaint leading to consultation is: Moyamoya, H/o L MCA stroke, neurologic changes    Visit type: audiovisual    Face to Face time with patient: 25 minutes  70 minutes of total time spent on the encounter, which includes face to face time and non-face to face time preparing to see the patient (eg, review of tests), Obtaining and/or reviewing separately obtained history, Documenting clinical information in the electronic or other health record, Independently interpreting results (not separately reported) and communicating results to the patient/family/caregiver, or Care coordination (not separately reported).         Each patient to whom he or she provides medical services by telemedicine is:  (1) informed of the relationship between the physician and patient and the respective role of any other health care provider with respect to management of the patient; and (2) notified that he or she may decline to receive medical services by telemedicine and may withdraw from such care at any time.    Notes:     Neurosurgery  Established Patient    SUBJECTIVE:     History of Present Illness:  Ally Ponce is a 43 y.o. female with PMH of L BG stroke, found on 6/21/19 after presenting with acute R hemiparesis and speech difficulty. She was stabilized and discharged home, but returned to the ED shortly after with shaking and tremors of the left extremities and balance difficulty. Follow up MRI showed the previous infarct and was negative for new strokes, but did show stenotic L MCA that was interpreted at OSH as vasculitic changes. 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 DAPT was resumed and she was discharged home. She subsequently followed in clinic with Dr. Seo, underwent serial  imaging including angiogram x 2, which showed interval stability and good collateralization of blood flow, without any new ischemic events, so cerebral revascularization procedure was deemed unnecessary at the time. Last visit in our clinic was in August of 2020. She continued to follow with Vascular Neurology, Dr. Benjamin.    Pt was recently admitted in July 2023. Presented to [facility] ED on 7/1 with report of confusion and memory problems x 6 days, then developed headache along with tinnitus and decreased hearing on the left. She was noted to have facial twitching, with rhythmic contractions of the chin. MRI showed stable appearing prior L BG stroke along with multiple subcentimeter T2 hyperintense foci in the bilateral supratentorial white matter. She was transferred to [facility], underwent angiogram, which showed overall stable findings compared to prior. EEG was done, showed mild diffuse encephalopathy, no epileptiform activity or correlate with chin fasciculations. She was started on Keppra empirically, encephalopathy improved during admission and was discharged home on 7/8.    Today, pt presents via virtual visit for interval follow up. Accompanied by her sister. Reports new/worsening numbness in the RLE. States now has no feeling in the R leg from the knee down for the past 2 weeks (prior to this sensation was chronically decreased but present). Endorses feeling more weak in the R leg as well, able to walk but with more difficulty. Denies any new symptoms in the R arm. She denies any continued chin or facial twitching, but describes episodes of rapid uncontrollable blinking in the R eye that began sometime after discharge. Usually occurs at night, resolves after about 5 minutes. Denies focal visual deficits, other uncontrollable body movements or jerking. Remains conscious during episodes. Remains on Keppra, as well as  and Plavix 75.           Review of patient's allergies indicates:  No Known  Allergies    Current Outpatient Medications   Medication Sig Dispense Refill    acetaminophen (TYLENOL) 325 MG tablet Take 2 tablets (650 mg total) by mouth every 4 (four) hours as needed. 30 tablet 0    aspirin (ECOTRIN) 325 MG EC tablet Take 1 tablet (325 mg total) by mouth once daily.  0    atorvastatin (LIPITOR) 80 MG tablet Take 1 tablet (80 mg total) by mouth once daily. 90 tablet 3    baclofen (LIORESAL) 10 MG tablet Take 1 tablet (10 mg total) by mouth 2 (two) times daily. 60 tablet 11    bisacodyL (DULCOLAX) 10 mg Supp Place 1 suppository (10 mg total) rectally daily as needed (Until bowel movement if patient has no bowel movement for 2 days). 30 suppository 0    calcium carbonate (CALCIUM 300 ORAL) Take 50 mg by mouth Daily.      clopidogrel (PLAVIX) 75 mg tablet Take 75 mg by mouth once daily.      cyanocobalamin, vitamin B-12, 50 mcg tablet Take 50 mcg by mouth once daily.      FLUoxetine 20 MG capsule Take 1 capsule (20 mg total) by mouth every evening. 30 capsule 11    fluticasone propionate (FLONASE) 50 mcg/actuation nasal spray 1 spray by Each Nostril route daily as needed.      gabapentin (NEURONTIN) 100 MG capsule TAKE ONE CAPSULE BY MOUTH THREE TIMES DAILY 30 capsule 1    levETIRAcetam (KEPPRA) 500 MG Tab Take 1 tablet (500 mg total) by mouth 2 (two) times daily. 60 tablet 11    magnesium oxide (MAG-OX) 400 mg (241.3 mg magnesium) tablet TAKE ONE TABLET BY MOUTH ONCE DAILY 30 tablet 5    montelukast (SINGULAIR) 10 mg tablet Take 10 mg by mouth every evening.      multivitamin Tab Take 1 tablet by mouth once daily.      ondansetron (ZOFRAN) 4 MG tablet Take 1 tablet (4 mg total) by mouth every 8 (eight) hours as needed. 90 tablet 0    pantoprazole (PROTONIX) 40 MG tablet Take 40 mg by mouth daily as needed.  2    polyethylene glycol (GLYCOLAX) 17 gram PwPk Take 17 g by mouth daily as needed (constipation). 30 packet 3     No current facility-administered medications for this visit.       Past  Medical History:   Diagnosis Date    Dysarthria     Hemiparesis affecting right side as late effect of cerebrovascular accident (CVA)     High cholesterol     Hyperreflexia of lower extremity     Moyamoya     Stroke 06/21/2019    Stroke 07/2019     Past Surgical History:   Procedure Laterality Date    CEREBRAL ANGIOGRAM N/A 10/29/2019    Procedure: ANGIOGRAM-CEREBRAL;  Surgeon: [facility] Diagnostic Provider;  [facility];  Service: Radiology;  Laterality: N/A;  Rm 190/[place]    CEREBRAL ANGIOGRAM N/A 08/18/2020    Procedure: ANGIOGRAM-CEREBRAL;  Surgeon: [facility] Diagnostic Provider;  [facility];  Service: Radiology;  Laterality: N/A;  Rm190/[place]    LAPAROSCOPIC SALPINGECTOMY Bilateral 11/08/2021    Procedure: SALPINGECTOMY, LAPAROSCOPIC;  Surgeon: Koko Calle MD;  Location: [facility] OR;  Service: OB/GYN;  Laterality: Bilateral;    LAPAROSCOPIC TOTAL HYSTERECTOMY N/A 11/08/2021    Procedure: HYSTERECTOMY, TOTAL, LAPAROSCOPIC;  Surgeon: Koko Calle MD;  Location: [facility] OR;  Service: OB/GYN;  Laterality: N/A;    TUBAL LIGATION  2005     Family History       Problem Relation (Age of Onset)    Asthma Brother    Cancer Brother    Diabetes Father    Heart disease Father    Hyperlipidemia Mother, Sister    Hypertension Sister    Stroke Father          Social History     Socioeconomic History    Marital status: Legally    Tobacco Use    Smoking status: Never    Smokeless tobacco: Never   Substance and Sexual Activity    Alcohol use: Never    Drug use: Never    Sexual activity: Not Currently     Birth control/protection: See Surgical Hx     Social Determinants of Health     Financial Resource Strain: Medium Risk (7/2/2023)    Overall Financial Resource Strain (CARDIA)     Difficulty of Paying Living Expenses: Somewhat hard   Food Insecurity: No Food Insecurity (7/2/2023)    Hunger Vital Sign     Worried About Running Out of Food in the Last Year: Never true     Ran Out of Food in the  Last Year: Never true   Transportation Needs: No Transportation Needs (7/2/2023)    PRAPARE - Transportation     Lack of Transportation (Medical): No     Lack of Transportation (Non-Medical): No   Physical Activity: Inactive (7/2/2023)    Exercise Vital Sign     Days of Exercise per Week: 0 days     Minutes of Exercise per Session: 0 min   Stress: Stress Concern Present (7/2/2023)    [employer] [place] [employer] - Occupational Stress Questionnaire     Feeling of Stress : Very much   Social Connections: Moderately Isolated (7/2/2023)    Social Connection and Isolation Panel [NHANES]     Frequency of Communication with Friends and Family: More than three times a week     Frequency of Social Gatherings with Friends and Family: More than three times a week     Attends Denominational Services: More than 4 times per year     Active Member of Clubs or Organizations: No     Attends Club or Organization Meetings: Never     Marital Status:    Housing Stability: Unknown (7/2/2023)    Housing Stability Vital Sign     Unable to Pay for Housing in the Last Year: No     Unstable Housing in the Last Year: No       Review of Systems  Positive per HPI, otherwise a pertinent ROS was performed and was negative.        OBJECTIVE:     Vital Signs     There is no height or weight on file to calculate BMI.    Neurosurgery Physical Exam  General: well developed, well nourished, no distress.   Head: normocephalic, atraumatic  Neck: appears supple, full ROM  Neurologic: Alert and oriented. Thought content appropriate. Able to follow complex commands. Responses sometimes slowed. Difficulty articulating timeline of past, memory seems impaired.  Language: No aphasia, goal-directed  Speech: Mild dysarthria present  Cranial nerves: L facial droop, tongue grossly midline  Eyes: Unable to assess pupils. EOMI appear grossly intact. No nystagmus visualized.  Pulmonary: no signs of respiratory distress, no labored breathing  Motor  Strength: Moves all extremities spontaneously with good tone. BUE and BLE are at least 3/5. RLE appears weaker than LLE, unable to hold RLE elevated for full 5 seconds.  Finger-to-nose: intact bilaterally   Pronator drift: negative in bilateral upper extremities          Diagnostic Results:  Imaging was independently reviewed by myself.    MRI brain w/o 6/29/23:  - Chronic lacunar infarct in the L frontal periventricular and corona radiata white matter  - Few subcentimeter T2 hyperintense foci in the bilateral supratentorial white matter, without corresponding diffusion restriction, non-specific    Diagnostic cerebral angiogram 7/6/23:  - Smooth tapering stenosis of the distal L M1, extending to the proximal M2 segments, with degree of stenosis appearing to be greater than 50% but stable compared to prior angiogram, with delayed filling in the corresponding MCA territories. Zone of hypoperfusion in the posterior frontal/parietal MCA territories of the left. Leptomeningeal collateralization to the L MCA territories from the R A1 to L PENELOPE segments.       ASSESSMENT/PLAN:     Ally Ponce is a 43 y.o. 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 Recently admitted in July with constellation of neurologic symptoms (AMS, facial twitching, headaches), workup negative for acute cerebral ischemia or apparent worsening of intracranial stenosis, with stable delayed perfusion of the L posterior frontal/parietal MCA territories on angiography. Questionable seizure activity although EEG was negative during admission, discharged on Keppra and remains compliant with this.     Pt now with interval development of new loss of sensation in the R distal leg and concern for progression of RLE weakness x 2 weeks, as well as intermittent R eyelid fasciculations.    - Given patient's history and new neurologic deficits, will get new MRI  brain w/ perfusion and MRA now to re-assess for new ischemic event or worsening cerebrovascular changes.   - Continue DAPT with  and Plavix for maximal stroke prevention  - Continue Keppra for now  - Would benefit from follow up with Vascular Neurology as well - message sent to Dr. Benjamin's staff to assist with scheduling the pt  - Will follow up with pt after imaging completed              Bergman bergman disease  -     MRI Brain (Tumor with Perfusion) W W/O Contrast (XPD); Future; Expected date: 09/19/2023  -     MRA Brain without contrast; Future; Expected date: 09/19/2023    Other symptoms and signs involving the nervous system  -     MRI Brain (Tumor with Perfusion) W W/O Contrast (XPD); Future; Expected date: 09/19/2023  -     MRA Brain without contrast; Future; Expected date: 09/19/2023    History of ischemic left MCA stroke  -     MRI Brain (Tumor with Perfusion) W W/O Contrast (XPD); Future; Expected date: 09/19/2023  -     MRA Brain without contrast; Future; Expected date: 09/19/2023          Lisette Avendano PA-C  Neurosurgery  [facility]      Time spent on this encounter: 70 minutes. This includes face to face time and non-face to face time preparing to see the patient (eg, review of tests), obtaining and/or reviewing separately obtained history, documenting clinical information in the electronic or other health record, independently interpreting results and communicating results to the patient/family/caregiver, or care coordinator.

## 2023-09-19 NOTE — TELEPHONE ENCOUNTER
----- Message from Lisette Avendano PA-C sent at 9/19/2023  3:35 PM CDT -----  Hi,    I had a virtual visit with pt today and she is reporting new numbness in the distal R leg with worse weakness for about 2 weeks. Also having episodes of rapid uncontrollable eye blinking in the R eye. I am getting her scheduled for MRI with perfusion and MRA, but would like to get her in to see Dr. Benjamin as well. Can you please help to get an appt scheduled?     Thank you!  Cecelia

## 2023-10-23 ENCOUNTER — HOSPITAL ENCOUNTER (OUTPATIENT)
Dept: RADIOLOGY | Facility: HOSPITAL | Age: 43
Discharge: HOME | End: 2023-10-23
Attending: PHYSICIAN ASSISTANT
Payer: MEDICAID

## 2023-10-23 DIAGNOSIS — Z86.73 HISTORY OF ISCHEMIC LEFT MCA STROKE: ICD-10-CM

## 2023-10-23 DIAGNOSIS — R29.818 OTHER SYMPTOMS AND SIGNS INVOLVING THE NERVOUS SYSTEM: ICD-10-CM

## 2023-10-23 DIAGNOSIS — I67.5 MOYA MOYA DISEASE: ICD-10-CM

## 2023-10-23 DIAGNOSIS — Z86.73 HISTORY OF ISCHEMIC LEFT MCA STROKE: Chronic | ICD-10-CM

## 2023-10-23 DIAGNOSIS — I67.5 MOYA MOYA DISEASE: Chronic | ICD-10-CM

## 2023-10-23 PROCEDURE — 70544 MR ANGIOGRAPHY HEAD W/O DYE: CPT | Mod: 26,59,, | Performed by: RADIOLOGY

## 2023-10-23 PROCEDURE — 70544 MR ANGIOGRAPHY HEAD W/O DYE: CPT | Mod: TC,59

## 2023-10-23 PROCEDURE — 70553 MRI BRAIN STEM W/O & W/DYE: CPT | Mod: TC

## 2023-10-23 PROCEDURE — 25500020 PHARM REV CODE 255: Performed by: PHYSICIAN ASSISTANT

## 2023-10-23 PROCEDURE — 70553 MRI BRAIN (TUMOR WITH PERFUSION) W W/O CONTRAST (XPD): ICD-10-PCS | Mod: 26,,, | Performed by: RADIOLOGY

## 2023-10-23 PROCEDURE — 70544 MRA BRAIN WITHOUT CONTRAST: ICD-10-PCS | Mod: 26,59,, | Performed by: RADIOLOGY

## 2023-10-23 PROCEDURE — 70553 MRI BRAIN STEM W/O & W/DYE: CPT | Mod: 26,,, | Performed by: RADIOLOGY

## 2023-10-23 PROCEDURE — A9585 GADOBUTROL INJECTION: HCPCS | Performed by: PHYSICIAN ASSISTANT

## 2023-10-23 RX ORDER — GADOBUTROL 604.72 MG/ML
7 INJECTION INTRAVENOUS
Status: COMPLETED | OUTPATIENT
Start: 2023-10-23 | End: 2023-10-23

## 2023-10-23 RX ADMIN — GADOBUTROL 7 ML: 604.72 INJECTION INTRAVENOUS at 12:10

## 2023-10-31 ENCOUNTER — OFFICE VISIT (OUTPATIENT)
Dept: NEUROSURGERY | Facility: CLINIC | Age: 43
End: 2023-10-31
Payer: MEDICAID

## 2023-10-31 DIAGNOSIS — I63.89 OTHER CEREBRAL INFARCTION: ICD-10-CM

## 2023-10-31 DIAGNOSIS — I67.5 MOYA MOYA DISEASE: Primary | ICD-10-CM

## 2023-10-31 PROCEDURE — 3044F PR MOST RECENT HEMOGLOBIN A1C LEVEL <7.0%: ICD-10-PCS | Mod: CPTII,95,, | Performed by: PHYSICIAN ASSISTANT

## 2023-10-31 PROCEDURE — 99215 OFFICE O/P EST HI 40 MIN: CPT | Mod: 95,,, | Performed by: PHYSICIAN ASSISTANT

## 2023-10-31 PROCEDURE — 99215 PR OFFICE/OUTPT VISIT, EST, LEVL V, 40-54 MIN: ICD-10-PCS | Mod: 95,,, | Performed by: PHYSICIAN ASSISTANT

## 2023-10-31 PROCEDURE — 3044F HG A1C LEVEL LT 7.0%: CPT | Mod: CPTII,95,, | Performed by: PHYSICIAN ASSISTANT

## 2023-10-31 NOTE — PROGRESS NOTES
The patient location is: [place]  The chief complaint leading to consultation is: Moyamoya, H/o L MCA stroke, neurologic changes; follow up    Visit type: audiovisual    Face to Face time with patient: 15 minutes  50 minutes of total time spent on the encounter, which includes face to face time and non-face to face time preparing to see the patient (eg, review of tests), Obtaining and/or reviewing separately obtained history, Documenting clinical information in the electronic or other health record, Independently interpreting results (not separately reported) and communicating results to the patient/family/caregiver, or Care coordination (not separately reported).         Each patient to whom he or she provides medical services by telemedicine is:  (1) informed of the relationship between the physician and patient and the respective role of any other health care provider with respect to management of the patient; and (2) notified that he or she may decline to receive medical services by telemedicine and may withdraw from such care at any time.    Notes:     Neurosurgery  Established Patient    SUBJECTIVE:     History of Present Illness 9/19/23:  Ally Ponce is a 43 y.o. female with PMH of L BG stroke, found on 6/21/19 after presenting with acute R hemiparesis and speech difficulty. She was stabilized and discharged home, but returned to the ED shortly after with shaking and tremors of the left extremities and balance difficulty. Follow up MRI showed the previous infarct and was negative for new strokes, but did show stenotic L MCA that was interpreted at OSH as vasculitic changes. 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 DAPT was resumed and she was discharged home. She subsequently followed in clinic with Dr. Seo,  underwent serial imaging including angiogram x 2, which showed interval stability and good collateralization of blood flow, without any new ischemic events, so cerebral revascularization procedure was deemed unnecessary at the time. Last visit in our clinic was in August of 2020. She continued to follow with Vascular Neurology, Dr. Benjamin.     Pt was recently admitted in July 2023. Presented to [facility] ED on 7/1 with report of confusion and memory problems x 6 days, then developed headache along with tinnitus and decreased hearing on the left. She was noted to have facial twitching, with rhythmic contractions of the chin. MRI showed stable appearing prior L BG stroke along with multiple subcentimeter T2 hyperintense foci in the bilateral supratentorial white matter. She was transferred to [facility], underwent angiogram, which showed overall stable findings compared to prior. EEG was done, showed mild diffuse encephalopathy, no epileptiform activity or correlate with chin fasciculations. She was started on Keppra empirically, encephalopathy improved during admission and was discharged home on 7/8.     Today, pt presents via virtual visit for interval follow up. Accompanied by her sister. Reports new/worsening numbness in the RLE. States now has no feeling in the R leg from the knee down for the past 2 weeks (prior to this sensation was chronically decreased but present). Endorses feeling more weak in the R leg as well, able to walk but with more difficulty. Denies any new symptoms in the R arm. She denies any continued chin or facial twitching, but describes episodes of rapid uncontrollable blinking in the R eye that began sometime after discharge. Usually occurs at night, resolves after about 5 minutes. Denies focal visual deficits, other uncontrollable body movements or jerking. Remains conscious during episodes. Remains on Keppra, as well as  and Plavix 75.     Interval History 10/31/23:  Patient presents via virtual  visit for follow up after undergoing MRI perfusion and MRA scans. Accompanied by her sister. Reports she has had ongoing weakness to the RLE since recent hospitalization. Able to ambulate but needs to use a cane at all times. Continues to endorse feeling numb throughout the R leg. The weakness and numbness are constant and do not fluctuate, although sister feels it becomes worse later in the day when pt is more fatigued. Also endorses painful tingling sensation to the entire R thom-body, states it is painful when she lays down on it. Denies feeling weak or numb in the R arm. Reports ongoing memory problems as well, sometimes has difficulty with word finding.     Review of patient's allergies indicates:  No Known Allergies    Current Outpatient Medications   Medication Sig Dispense Refill    acetaminophen (TYLENOL) 325 MG tablet Take 2 tablets (650 mg total) by mouth every 4 (four) hours as needed. 30 tablet 0    aspirin (ECOTRIN) 325 MG EC tablet Take 1 tablet (325 mg total) by mouth once daily.  0    atorvastatin (LIPITOR) 80 MG tablet Take 1 tablet (80 mg total) by mouth once daily. 90 tablet 3    baclofen (LIORESAL) 10 MG tablet Take 1 tablet (10 mg total) by mouth 2 (two) times daily. 60 tablet 11    bisacodyL (DULCOLAX) 10 mg Supp Place 1 suppository (10 mg total) rectally daily as needed (Until bowel movement if patient has no bowel movement for 2 days). 30 suppository 0    calcium carbonate (CALCIUM 300 ORAL) Take 50 mg by mouth Daily.      clopidogrel (PLAVIX) 75 mg tablet Take 75 mg by mouth once daily.      cyanocobalamin, vitamin B-12, 50 mcg tablet Take 50 mcg by mouth once daily.      FLUoxetine 20 MG capsule Take 1 capsule (20 mg total) by mouth every evening. 30 capsule 11    fluticasone propionate (FLONASE) 50 mcg/actuation nasal spray 1 spray by Each Nostril route daily as needed.      gabapentin (NEURONTIN) 100 MG capsule TAKE ONE CAPSULE BY MOUTH THREE TIMES DAILY 30 capsule 1    levETIRAcetam  (KEPPRA) 500 MG Tab Take 1 tablet (500 mg total) by mouth 2 (two) times daily. 60 tablet 11    magnesium oxide (MAG-OX) 400 mg (241.3 mg magnesium) tablet TAKE ONE TABLET BY MOUTH ONCE DAILY 30 tablet 5    montelukast (SINGULAIR) 10 mg tablet Take 10 mg by mouth every evening.      multivitamin Tab Take 1 tablet by mouth once daily.      ondansetron (ZOFRAN) 4 MG tablet Take 1 tablet (4 mg total) by mouth every 8 (eight) hours as needed. 90 tablet 0    pantoprazole (PROTONIX) 40 MG tablet Take 40 mg by mouth daily as needed.  2    polyethylene glycol (GLYCOLAX) 17 gram PwPk Take 17 g by mouth daily as needed (constipation). 30 packet 3     No current facility-administered medications for this visit.       Past Medical History:   Diagnosis Date    Dysarthria     Hemiparesis affecting right side as late effect of cerebrovascular accident (CVA)     High cholesterol     Hyperreflexia of lower extremity     Moyamoya     Stroke 06/21/2019    Stroke 07/2019     Past Surgical History:   Procedure Laterality Date    CEREBRAL ANGIOGRAM N/A 10/29/2019    Procedure: ANGIOGRAM-CEREBRAL;  Surgeon: [facility] Diagnostic Provider;  Location: [address];  Service: Radiology;  Laterality: N/A;   190/[place]    CEREBRAL ANGIOGRAM N/A 08/18/2020    Procedure: ANGIOGRAM-CEREBRAL;  Surgeon: [facility] Diagnostic Provider;  Location: [facility] OR [address] [place] [address];  Service: Radiology;  Laterality: N/A;  190/[place]    LAPAROSCOPIC SALPINGECTOMY Bilateral 11/08/2021    Procedure: SALPINGECTOMY, LAPAROSCOPIC;  Surgeon: Koko Calle MD;  Location: [facility] OR;  Service: OB/GYN;  Laterality: Bilateral;    LAPAROSCOPIC TOTAL HYSTERECTOMY N/A 11/08/2021    Procedure: HYSTERECTOMY, TOTAL, LAPAROSCOPIC;  Surgeon: Koko Calle MD;  Location: [facility] OR;  Service: OB/GYN;  Laterality: N/A;    TUBAL LIGATION  2005     Family History       Problem Relation (Age of Onset)    Asthma Brother    Cancer Brother    Diabetes Father    Heart disease Father     Hyperlipidemia Mother, Sister    Hypertension Sister    Stroke Father          Social History     Socioeconomic History    Marital status: Legally    Tobacco Use    Smoking status: Never    Smokeless tobacco: Never   Substance and Sexual Activity    Alcohol use: Never    Drug use: Never    Sexual activity: Not Currently     Birth control/protection: See Surgical Hx     Social Determinants of Health     Financial Resource Strain: Medium Risk (7/2/2023)    Overall Financial Resource Strain (CARDIA)     Difficulty of Paying Living Expenses: Somewhat hard   Food Insecurity: No Food Insecurity (7/2/2023)    Hunger Vital Sign     Worried About Running Out of Food in the Last Year: Never true     Ran Out of Food in the Last Year: Never true   Transportation Needs: No Transportation Needs (7/2/2023)    PRAPARE - Transportation     Lack of Transportation (Medical): No     Lack of Transportation (Non-Medical): No   Physical Activity: Inactive (7/2/2023)    Exercise Vital Sign     Days of Exercise per Week: 0 days     Minutes of Exercise per Session: 0 min   Stress: Stress Concern Present (7/2/2023)    [employer] [place] [employer] - Occupational Stress Questionnaire     Feeling of Stress : Very much   Social Connections: Moderately Isolated (7/2/2023)    Social Connection and Isolation Panel [NHANES]     Frequency of Communication with Friends and Family: More than three times a week     Frequency of Social Gatherings with Friends and Family: More than three times a week     Attends Latter-day Services: More than 4 times per year     Active Member of Clubs or Organizations: No     Attends Club or Organization Meetings: Never     Marital Status:    Housing Stability: Unknown (7/2/2023)    Housing Stability Vital Sign     Unable to Pay for Housing in the Last Year: No     Unstable Housing in the Last Year: No       Review of Systems  Positive per HPI, otherwise a pertinent ROS was performed and was  negative.        OBJECTIVE:     Vital Signs     There is no height or weight on file to calculate BMI.    Neurosurgery Physical Exam  General: well developed, well nourished, no distress.   Head: normocephalic, atraumatic  Neck: appears supple, full ROM  Neurologic: Alert and oriented. Thought content appropriate. Able to follow complex commands. Responses sometimes slowed. Difficulty articulating timeline of past, memory seems impaired.  Language: No aphasia, goal-directed  Speech: Mild dysarthria present  Cranial nerves: Mild L facial droop, tongue grossly midline  Eyes: Unable to assess pupils. EOMI appear grossly intact. No nystagmus visualized.  Pulmonary: no signs of respiratory distress, no labored breathing  Motor Strength: Moves all extremities spontaneously with good tone. BUE and BLE are at least 3/5. RLE appears weaker than LLE, able to ambulate using cane  Finger-to-nose: intact bilaterally   Pronator drift: negative in bilateral upper extremities        Diagnostic Results:  Imaging was independently reviewed by myself.    MRI brain w/o 6/29/23:  - Chronic lacunar infarct in the L frontal periventricular and corona radiata white matter  - Few subcentimeter T2 hyperintense foci in the bilateral supratentorial white matter, without corresponding diffusion restriction, non-specific     Diagnostic cerebral angiogram 7/6/23:  - Smooth tapering stenosis of the distal L M1, extending to the proximal M2 segments, with degree of stenosis appearing to be greater than 50% but stable compared to prior angiogram, with delayed filling in the corresponding MCA territories. Zone of hypoperfusion in the posterior frontal/parietal MCA territories of the left. Leptomeningeal collateralization to the L MCA territories from the R A1 to L PENELOPE segments.      MRI brain perfusion and MRA 10/23/23:  - Remote L BG infarct, no evidence of acute infract  - Elevated MTT to the L corona radiata/parietal lobe with preserved CBF/CBV,  without evidence of ischemia  - Moderate L distal M1 to proximal M2 stenosis, overall similar in caliber to prior exam    ASSESSMENT/PLAN:     Ally Ponce is a 43 y.o. female with PMH of MoyaMoya, with known stenosis of the distal left M1 extending to the proximal M2 branches, with prior L BG infarct in 2019. Recently admitted in July 2023 with constellation of neurologic symptoms (AMS, facial twitching, headaches), workup negative for acute cerebral ischemia or apparent worsening of intracranial stenosis, with stable delayed perfusion of the L posterior frontal/parietal MCA territories on angiography. Questionable seizure activity although EEG was negative during admission, discharged on Keppra and remains compliant with this.     Updated MRI/MRA done due to concern for worsening RLE weakness/numbness. Imaging findings overall stable without evidence of new infarct or ischemia on perfusion imaging. Unclear etiology of pt's symptoms. Possible recrudescence of prior stroke.     - No role for cerebral revascularization procedure at this time, given stability of imaging and continued evidence of compensated perfusion via collateralization  - Continue DAPT for stroke prevention  - Follow up with Vascular Neurology for additional recommendations  - Plan for RTC in about 1 year with repeat MRI/MRA, or sooner if needed        Diagnoses addressed and orders placed this encounter:      Bergman bergman disease  -     MRI Brain (Tumor with Perfusion) W W/O Contrast (XPD); Future; Expected date: 10/01/2024  -     MRA Brain without contrast; Future; Expected date: 10/01/2024    Other cerebral infarction  -     MRI Brain (Tumor with Perfusion) W W/O Contrast (XPD); Future; Expected date: 10/01/2024  -     MRA Brain without contrast; Future; Expected date: 10/01/2024          Lisette Avendano PA-C  Neurosurgery  [facility]        Time spent on this encounter: 50 minutes. This includes face to face time and  non-face to face time preparing to see the patient (eg, review of tests), obtaining and/or reviewing separately obtained history, documenting clinical information in the electronic or other health record, independently interpreting results and communicating results to the patient/family/caregiver, or care coordinator.

## 2023-11-14 RX ORDER — LANOLIN ALCOHOL/MO/W.PET/CERES
CREAM (GRAM) TOPICAL
Qty: 30 TABLET | Refills: 5 | Status: SHIPPED | OUTPATIENT
Start: 2023-11-14

## 2023-12-04 ENCOUNTER — OFFICE VISIT (OUTPATIENT)
Dept: NEUROLOGY | Facility: CLINIC | Age: 43
End: 2023-12-04
Payer: MEDICAID

## 2023-12-04 VITALS
BODY MASS INDEX: 27.03 KG/M2 | HEIGHT: 63 IN | DIASTOLIC BLOOD PRESSURE: 65 MMHG | SYSTOLIC BLOOD PRESSURE: 101 MMHG | WEIGHT: 152.56 LBS

## 2023-12-04 DIAGNOSIS — Z86.73 HISTORY OF ISCHEMIC LEFT MCA STROKE: Chronic | ICD-10-CM

## 2023-12-04 DIAGNOSIS — I67.5 MOYA MOYA DISEASE: Primary | Chronic | ICD-10-CM

## 2023-12-04 DIAGNOSIS — R51.9 CHRONIC RIGHT-SIDED HEADACHES: Chronic | ICD-10-CM

## 2023-12-04 DIAGNOSIS — R59.0 LOCALIZED ENLARGED LYMPH NODES: ICD-10-CM

## 2023-12-04 DIAGNOSIS — I69.351 HEMIPARESIS OF RIGHT DOMINANT SIDE AS LATE EFFECT OF CEREBRAL INFARCTION: Chronic | ICD-10-CM

## 2023-12-04 DIAGNOSIS — G89.29 CHRONIC RIGHT-SIDED HEADACHES: Chronic | ICD-10-CM

## 2023-12-04 PROCEDURE — 99999 PR PBB SHADOW E&M-EST. PATIENT-LVL IV: CPT | Mod: PBBFAC,,, | Performed by: PSYCHIATRY & NEUROLOGY

## 2023-12-04 PROCEDURE — 3008F BODY MASS INDEX DOCD: CPT | Mod: CPTII,,, | Performed by: PSYCHIATRY & NEUROLOGY

## 2023-12-04 PROCEDURE — 1159F MED LIST DOCD IN RCRD: CPT | Mod: CPTII,,, | Performed by: PSYCHIATRY & NEUROLOGY

## 2023-12-04 PROCEDURE — 99214 OFFICE O/P EST MOD 30 MIN: CPT | Mod: PBBFAC | Performed by: PSYCHIATRY & NEUROLOGY

## 2023-12-04 PROCEDURE — 99215 OFFICE O/P EST HI 40 MIN: CPT | Mod: S$PBB,,, | Performed by: PSYCHIATRY & NEUROLOGY

## 2023-12-04 PROCEDURE — 3074F SYST BP LT 130 MM HG: CPT | Mod: CPTII,,, | Performed by: PSYCHIATRY & NEUROLOGY

## 2023-12-04 PROCEDURE — 3078F DIAST BP <80 MM HG: CPT | Mod: CPTII,,, | Performed by: PSYCHIATRY & NEUROLOGY

## 2023-12-04 RX ORDER — ATOGEPANT 60 MG/1
60 TABLET ORAL DAILY
Qty: 30 TABLET | Refills: 3 | Status: SHIPPED | OUTPATIENT
Start: 2023-12-04

## 2023-12-04 NOTE — PATIENT INSTRUCTIONS
- Prescription for Qulipta: will try to get prior authorization through insurance  - Continue aspirin 325 mg and atorvastatin 80 mg daily for stroke prevention  - Stay well hydrated  - Outpatient prolonged EEG

## 2023-12-04 NOTE — PROGRESS NOTES
"    Vascular Neurology  Clinic Visit    Reason For Visit (Chief Complaint): headaches, Leslie Leslie f/u    HPI: Ms. Ponce is a 43 y.o. woman, previously healthy, who developed R hemiparesis and speech difficulty on 6/21/19, found to have acute L-BG stroke.  She was transferred from [place] to [facility] in early August for headaches and worsening R sided symptoms, and concern for vasculitis.      Briefly, Ms. Ponce represented to [place] in late July for persistent headaches and worsening of R sided weakness. MRI brain was negative for new strokes, but did show stenotic L-MCA that the OSH interpreted as vasculitic changes.  She was started on steroids and underwent LP which was bland (2->0 WBC, P31, Glu70).  She was transferred to [facility] for angio, which showed tapering to near occlusion of LM1, with numerous lenticulostriate collateral vessels, in a MoyaMoya pattern.  No other vascular abnormalities to suggest vasculitis were seen.   DAPT was resumed and she was treated for nausea and headaches.  She was discharged home with outpatient therapy.    Taking tylenol about every other day for sharp pains in R side of head.  Described as brief stabbing like pain.  Taking gabapentin for leg pain and headaches.  No longer taking amitriptyline.  Reports drinking plenty of water.   Mood has been okay.  Slipped recently in bathtub - doesn't like to use her shower chair.  Had hysterectomy without incident.     Interval History:  Continues to have intermittent, various neurological symptoms.  R leg often goes to sleep when she sits down.  Also noted R inguinal pain.  Some days she seems really "off."   Texts to family sometimes don't make sense (happens 1-2x week).  Using a cane to walk to to prevent falls.  Very sensitive to bright lights and flashign lights --> started after starting Keppra.  Started on medciation for heaaches by PCP (got samples of Qulipta, which helps HA).      Brain Imaging:  MRI Brain w/ Perfusion 10/23/23:  MRI " brain: Small remote left basal gangliar infarction with prior hemorrhage unchanged.  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  MR perfusion: Elevated mean transit time left posterior corona radiata/parietal lobe with preserved relative cerebral blood flow and blood volume suggestive for underlying compensated stenosis in light of MRA findings.  No evidence for ischemia/oligemia.  MRA head: Moderate left distal M1 proximal M2 MCA stenosis similar to prior.  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      Cardiac Evaluation:  TTE 7/29/19:  LVEF 55%  NL LV diastolic dysfunction  Neg bubble    Relevant Labwork:  Recent Labs   Lab 07/02/23  0436   Hemoglobin A1C 5.3           I independently viewed the above imaging studies and  I reviewed the reports of the above imaging.  I reviewed the above labwork.    Review of Systems  Msk: negative for muscle pain  Skin: negative for pruritis  Neuro: + HA, imbalance  All others negative    Past Medical History  Past Medical History:   Diagnosis Date    Dysarthria     Hemiparesis affecting right side as late effect of cerebrovascular accident (CVA)     High cholesterol     Hyperreflexia of lower extremity     Moyamoya     Stroke 06/21/2019    Stroke 07/2019     Family History  No family history stroke or neurological disease.    Social History  Completed high school.  Was working mowing lawns prior to stroke.  Non-smoker.    Medication List with Changes/Refills   New Medications    ATOGEPANT (QULIPTA) 60 MG TAB    Take 60 mg by mouth once daily.   Current Medications    ACETAMINOPHEN (TYLENOL) 325 MG TABLET    Take 2 tablets (650 mg total) by mouth every 4 (four) hours as needed.    ASPIRIN  "(ECOTRIN) 325 MG EC TABLET    Take 1 tablet (325 mg total) by mouth once daily.    ATORVASTATIN (LIPITOR) 80 MG TABLET    Take 1 tablet (80 mg total) by mouth once daily.    BACLOFEN (LIORESAL) 10 MG TABLET    Take 1 tablet (10 mg total) by mouth 2 (two) times daily.    BISACODYL (DULCOLAX) 10 MG SUPP    Place 1 suppository (10 mg total) rectally daily as needed (Until bowel movement if patient has no bowel movement for 2 days).    CALCIUM CARBONATE (CALCIUM 300 ORAL)    Take 50 mg by mouth Daily.    CLOPIDOGREL (PLAVIX) 75 MG TABLET    Take 75 mg by mouth once daily.    CYANOCOBALAMIN, VITAMIN B-12, 50 MCG TABLET    Take 50 mcg by mouth once daily.    FLUOXETINE 20 MG CAPSULE    Take 1 capsule (20 mg total) by mouth every evening.    FLUTICASONE PROPIONATE (FLONASE) 50 MCG/ACTUATION NASAL SPRAY    1 spray by Each Nostril route daily as needed.    GABAPENTIN (NEURONTIN) 100 MG CAPSULE    TAKE ONE CAPSULE BY MOUTH THREE TIMES DAILY    LEVETIRACETAM (KEPPRA) 500 MG TAB    Take 1 tablet (500 mg total) by mouth 2 (two) times daily.    MAGNESIUM OXIDE (MAG-OX) 400 MG (241.3 MG MAGNESIUM) TABLET    TAKE ONE TABLET BY MOUTH ONCE DAILY    MONTELUKAST (SINGULAIR) 10 MG TABLET    Take 10 mg by mouth every evening.    MULTIVITAMIN TAB    Take 1 tablet by mouth once daily.    ONDANSETRON (ZOFRAN) 4 MG TABLET    Take 1 tablet (4 mg total) by mouth every 8 (eight) hours as needed.    PANTOPRAZOLE (PROTONIX) 40 MG TABLET    Take 40 mg by mouth daily as needed.    POLYETHYLENE GLYCOL (GLYCOLAX) 17 GRAM PWPK    Take 17 g by mouth daily as needed (constipation).       EXAM  Vital Signs  Pulse: (!) (P) 58  BP: 101/65  Pain Score: 0-No pain  Height and Weight  Height: 5' 3" (160 cm)  Weight: 69.2 kg (152 lb 8.9 oz)  BSA (Calculated - sq m): 1.75 sq meters  BMI (Calculated): 27  Weight in (lb) to have BMI = 25: 140.8]    General: well appearing without discomfort   Neck: full ROM  Eyes: conjunctiva clear, no icterus  CV: RRR, nL " S1&S2  Resp: breathing comfortably, no wheezing  Ext: wwp, no edema    Mental Status: Alert and oriented, normal attention, speech fluent and prosodic, naming and repetition intact, follows multistep embedded commands, no e/o neglect or extinction  Cranial Nerves: PERRL, EOMI, VFF, sensation reduced R face, L facial, droop mildly disarthric, SCM/trap 5/5  Motor: Orbits R arm  Giveway weakness L should abd - remainder 5/5 throughout  Sensory: reduced sensation R arm/leg  Coordination: no ataxia on finger-to-nose  Gait & Stance: drags R leg slightly     ___________________  ASSESSMENT & PLAN  Ms. Ponce is a 39 y.o. woman, previously healthy, who developed R hemiparesis and speech difficulty on 6/21/19, found to have acute L-BG stroke 2/2 Bergman Bergman Disease.  Continues to have ongoing, chronic symptoms such as HA, numbness, cognitive issues post-stroke.  Recent MRI brain (September 2023) showed well compensated hemodynamics and no new stroke.  Continues on Keppra with no seizure-like events, however given episodes of difficult typing and seeming off, will pursue ambulatory EEG.  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    - Amb EEG  - Cont Keppra 500 mg bid.  - Cont DAPT and atorvastatin 80 mg qhs for stroke prevention  - Limit OTC pain relievers.  Qulipta Rx and prior auth submitted.  - Daily magnesium for headache prevention  - MRI pelvis for groin pain  - Stay well hydrated with water.  - RTC 6 mos.    Problem List Items Addressed This Visit          Unprioritized    History of ischemic left MCA stroke (Chronic)    Relevant Orders    Ambulatory EEG monitoring    Hemiparesis of right dominant side as late effect of cerebral infarction (Chronic)    Bergman bergman disease (Chronic)    Chronic right-sided headaches (Chronic)     Other Visit Diagnoses       Localized enlarged lymph nodes    -  Primary    Relevant Orders    CT Pelvis With IV Contrast NO Oral Contrast  (Completed)                Caridad Benjamin MD  Vascular Neurology

## 2023-12-20 ENCOUNTER — HOSPITAL ENCOUNTER (OUTPATIENT)
Dept: RADIOLOGY | Facility: HOSPITAL | Age: 43
Discharge: HOME | End: 2023-12-20
Attending: PSYCHIATRY & NEUROLOGY
Payer: MEDICAID

## 2023-12-20 DIAGNOSIS — R59.0 LOCALIZED ENLARGED LYMPH NODES: ICD-10-CM

## 2023-12-20 PROCEDURE — 72193 CT PELVIS WITH IV CONTRAST: ICD-10-PCS | Mod: 26,,, | Performed by: RADIOLOGY

## 2023-12-20 PROCEDURE — 72193 CT PELVIS W/DYE: CPT | Mod: 26,,, | Performed by: RADIOLOGY

## 2023-12-20 PROCEDURE — 25500020 PHARM REV CODE 255: Mod: PO | Performed by: PSYCHIATRY & NEUROLOGY

## 2023-12-20 PROCEDURE — 72193 CT PELVIS W/DYE: CPT | Mod: TC,PO

## 2023-12-20 RX ADMIN — IOHEXOL 75 ML: 350 INJECTION, SOLUTION INTRAVENOUS at 12:12

## 2024-01-02 ENCOUNTER — TELEPHONE (OUTPATIENT)
Dept: NEUROLOGY | Facility: CLINIC | Age: 44
End: 2024-01-02
Payer: MEDICAID

## 2024-01-02 NOTE — TELEPHONE ENCOUNTER
----- Message from Yoon Irene sent at 1/2/2024 10:44 AM CST -----  Pt calling in regard to her CT results , pt says he leg is hurting everyday       Confirmed patient's contact info below:  Contact Name: Ally Ponce  Phone Number: [phone]

## 2024-01-12 ENCOUNTER — TELEPHONE (OUTPATIENT)
Dept: NEUROLOGY | Facility: CLINIC | Age: 44
End: 2024-01-12
Payer: MEDICAID

## 2024-01-12 NOTE — TELEPHONE ENCOUNTER
----- Message from Charlene Torres sent at 1/12/2024  2:44 PM CST -----  Regarding: onel crump  Contact: Pt @ [phone]  Pt is calling to get virtual appt to discuss CT results. Asking for a call back

## 2024-01-22 ENCOUNTER — TELEPHONE (OUTPATIENT)
Dept: NEUROLOGY | Facility: CLINIC | Age: 44
End: 2024-01-22

## 2024-01-22 NOTE — TELEPHONE ENCOUNTER
Called and spoke with patient.   Discussed a message will be sent to Antonette Todd to schedule outpatient ambulatory EEG 72hrs. Will submit prior authorization for Qulipta through Cover My Meds. Advised patient Dr. Benjamin planned for her to f/u in 6 months. Patient v/u.

## 2024-01-22 NOTE — TELEPHONE ENCOUNTER
----- Message from Graciela Mariscal RN sent at 1/22/2024  2:46 PM CST -----  Regarding: RE: appt/pt advice  Contact: [phone]  Messaged Antonette in [facility] re: EEG. Messaged Erin, pharm tech, Re: PA.     Thanks!  ----- Message -----  From: MayPriscilla  Sent: 1/22/2024   2:39 PM CST  To: Graciela Mariscal RN; Sourav Mclean Staff  Subject: appt/pt advice                                   Pt stated she has left several messages requesting a call back with no response. Pt was seen in Dec, she was supposed to have an EEG scheduled but has not heard anything. Pt stated provider was also supposed to call her insurance company about getting her headache medicine approved. Pt is going to her PCP every week to get samples at this time. Pls call to better discuss. Pt stated she is supposed to have a video appt after having her sleep study and she has not had that either.

## 2024-01-30 ENCOUNTER — HOSPITAL ENCOUNTER (OUTPATIENT)
Dept: NEUROLOGY | Facility: CLINIC | Age: 44
Discharge: HOME | End: 2024-01-30
Payer: MEDICAID

## 2024-01-30 PROCEDURE — 95723 EEG PHY/QHP>60<84 HR W/O VID: CPT | Mod: ,,, | Performed by: PSYCHIATRY & NEUROLOGY

## 2024-02-02 ENCOUNTER — HOSPITAL ENCOUNTER (OUTPATIENT)
Dept: NEUROLOGY | Facility: CLINIC | Age: 44
Discharge: HOME | End: 2024-02-02
Payer: MEDICAID

## 2024-02-02 DIAGNOSIS — I69.351 HEMIPARESIS OF RIGHT DOMINANT SIDE AS LATE EFFECT OF CEREBRAL INFARCTION: Primary | Chronic | ICD-10-CM

## 2024-02-23 ENCOUNTER — TELEPHONE (OUTPATIENT)
Dept: NEUROLOGY | Facility: CLINIC | Age: 44
End: 2024-02-23
Payer: MEDICAID

## 2024-02-23 NOTE — TELEPHONE ENCOUNTER
----- Message from Odalis Evans sent at 2/23/2024 10:40 AM CST -----  Regarding: Results needed  Contact: @ [phone]  Pt is calling to speak to someone in the office to discuss test results. Pt is asking for a return call soon. Thanks.         Who Called: Self          Test Results for: AMBULATORY 72HR EEG and REMOVALS         Best call back number:  @ [phone]

## 2024-03-02 NOTE — PROCEDURES
AMBULATORY EEG REPORT      Ally Ponce  [id number]  1980    DATE OF SERVICE: 1/30/2024         METHODOLOGY      Extended electroencephalographic recording is made while the patient is ambulatory and continuing normal daily activities.  Electrodes are placed according to the International 10-20 placement system and included T1 and T2 electrode placement.  Twenty four (24) channels of digital signal (sampling rate of 512/sec) was simultaneously recorded from the scalp including EKG and eye monitors.  Recording band pass was 0.1 to 100 hz and all data was stored digitally on the recorder.  The patient is instructed to press an event button when clinical symptoms occur and write the symptoms into a diary. Activation procedures which include photic stimulation, hyperventilation and instructing patients to perform simple task are done in selected patients.        The EEG is displayed on a monitor screen and can be reformatted into different montages for evaluation.  The entire recoding is submitted for computer assisted analysis to detect spike and electrographic seizure activity.  The entire recording is visually reviewed and the times identified by computer analysis as being spikes or seizures are reviewed again.  Compresses spectral analysis (CSA) is also performed on the activity recorded from each individual channel.  This is displayed as a power display of frequencies from 0 to 30 Hz over time.   The CSA analysis is done and displayed continuously.  This is reviewed for asymmetries in power between homologous areas of the scalp and for presence of changes in power which canbe seen when seizures occur.  Sections of suspected abnormalities on the CSA is then compared with the original EEG recording.  .     [employer] software was also utilized in the review of this study.  This software suite analyzes the EEG recording in multiple domains.  Coherence and rhythmicity is computed to identify EEG sections which  may contain organized seizures.  Each channel undergoes analysis to detect presence of spike and sharp waves which have special and morphological characteristic of epileptic activity.  The routine EEG recording is converted from spacial into frequency domain.  This is then displayed comparing homologous areas to identify areas of significant asymmetry.  Algorithm to identify non-cortically generated artifact is used to separate eye movement, EMG and other artifact from the EEG     Recording Times  Start on 1/30/2024  Stop on 2/2/2024    A total of 68:01:13 hours of EEG was recorded.      EEG FINDINGS:  Background activity:   The background rhythm was characterized by alpha and anterior dominant beta activity with a 9-10Hz posterior dominant alpha rhythm at 30-70 microvolts.   Symmetry and continuity: the background was continuous and symmetric     Sleep:   Normal sleep transients including sleep spindles, K complexes, vertex waves and POSTS were seen.    Activation procedures:   Photic stimulation was performed with no abnormalities seen  Hyperventilation was performed with no abnormalities seen    Abnormal activity:   No epileptiform discharges, periodic discharges, lateralized rhythmic delta activity or electrographic seizures were seen.    There are numerous event marks throughout the recording with no EEG correlate.    IMPRESSION:   Normal three day ambulatory EEG      Richard Holder MD  Neurology-Epilepsy.  [facility].

## 2024-04-05 ENCOUNTER — TELEPHONE (OUTPATIENT)
Dept: NEUROLOGY | Facility: CLINIC | Age: 44
End: 2024-04-05
Payer: MEDICAID

## 2024-04-15 ENCOUNTER — LAB VISIT (OUTPATIENT)
Dept: LAB | Facility: HOSPITAL | Age: 44
End: 2024-04-15
Attending: SPECIALIST
Payer: MEDICAID

## 2024-04-15 ENCOUNTER — OFFICE VISIT (OUTPATIENT)
Dept: OBSTETRICS AND GYNECOLOGY | Facility: CLINIC | Age: 44
End: 2024-04-15
Payer: MEDICAID

## 2024-04-15 VITALS
DIASTOLIC BLOOD PRESSURE: 70 MMHG | WEIGHT: 167.13 LBS | HEIGHT: 63 IN | BODY MASS INDEX: 29.61 KG/M2 | SYSTOLIC BLOOD PRESSURE: 108 MMHG

## 2024-04-15 DIAGNOSIS — R59.1 LYMPHADENOPATHY: Primary | ICD-10-CM

## 2024-04-15 DIAGNOSIS — R59.1 LYMPHADENOPATHY: ICD-10-CM

## 2024-04-15 PROCEDURE — 3078F DIAST BP <80 MM HG: CPT | Mod: CPTII,,, | Performed by: SPECIALIST

## 2024-04-15 PROCEDURE — 3008F BODY MASS INDEX DOCD: CPT | Mod: CPTII,,, | Performed by: SPECIALIST

## 2024-04-15 PROCEDURE — 82784 ASSAY IGA/IGD/IGG/IGM EACH: CPT | Performed by: SPECIALIST

## 2024-04-15 PROCEDURE — 3074F SYST BP LT 130 MM HG: CPT | Mod: CPTII,,, | Performed by: SPECIALIST

## 2024-04-15 PROCEDURE — 86618 LYME DISEASE ANTIBODY: CPT | Performed by: SPECIALIST

## 2024-04-15 PROCEDURE — 36415 COLL VENOUS BLD VENIPUNCTURE: CPT | Mod: PN | Performed by: SPECIALIST

## 2024-04-15 PROCEDURE — 99999 PR PBB SHADOW E&M-EST. PATIENT-LVL IV: CPT | Mod: PBBFAC,,, | Performed by: SPECIALIST

## 2024-04-15 PROCEDURE — 99214 OFFICE O/P EST MOD 30 MIN: CPT | Mod: PBBFAC,PN | Performed by: SPECIALIST

## 2024-04-15 PROCEDURE — 85025 COMPLETE CBC W/AUTO DIFF WBC: CPT | Performed by: SPECIALIST

## 2024-04-15 PROCEDURE — 99213 OFFICE O/P EST LOW 20 MIN: CPT | Mod: S$PBB,,, | Performed by: SPECIALIST

## 2024-04-15 PROCEDURE — 1159F MED LIST DOCD IN RCRD: CPT | Mod: CPTII,,, | Performed by: SPECIALIST

## 2024-04-15 PROCEDURE — 86592 SYPHILIS TEST NON-TREP QUAL: CPT | Performed by: SPECIALIST

## 2024-04-15 PROCEDURE — 86140 C-REACTIVE PROTEIN: CPT | Performed by: SPECIALIST

## 2024-04-15 NOTE — PROGRESS NOTES
45 yo WF presents for eval right sided inguinal adenopathy. Pt states present for several months  Assessed by PCP. In addition, pt has undergione pelvic CT scan 12/23 as well as pelvic u/s with NO overt etiologies  for reactive adenopathy pt denies f/c, d/c, dysuria, weight loss/gain, pelvic pain, melena, n/v  Past Medical History:   Diagnosis Date    Dysarthria     Hemiparesis affecting right side as late effect of cerebrovascular accident (CVA)     High cholesterol     Hyperreflexia of lower extremity     [place]     Stroke 06/21/2019    Stroke 07/2019       Past Surgical History:   Procedure Laterality Date    CEREBRAL ANGIOGRAM N/A 10/29/2019    Procedure: ANGIOGRAM-CEREBRAL;  Surgeon: [facility] Diagnostic Provider;  Location: [facility] OR [address] [place] [address];  Service: Radiology;  Laterality: N/A;   190/[place]    CEREBRAL ANGIOGRAM N/A 08/18/2020    Procedure: ANGIOGRAM-CEREBRAL;  Surgeon: [facility] Diagnostic Provider;  [facility];  Service: Radiology;  Laterality: N/A;  190/[place]    LAPAROSCOPIC SALPINGECTOMY Bilateral 11/08/2021    Procedure: SALPINGECTOMY, LAPAROSCOPIC;  Surgeon: Koko Calle MD;  Location: [facility] OR;  Service: OB/GYN;  Laterality: Bilateral;    LAPAROSCOPIC TOTAL HYSTERECTOMY N/A 11/08/2021    Procedure: HYSTERECTOMY, TOTAL, LAPAROSCOPIC;  Surgeon: Koko Calle MD;  Location: [facility] OR;  Service: OB/GYN;  Laterality: N/A;    TUBAL LIGATION  2005       Family History   Problem Relation Name Age of Onset    Hyperlipidemia Mother      Stroke Father      Diabetes Father      Heart disease Father      Hyperlipidemia Sister      Hypertension Sister      Cancer Brother          throat cancer    Asthma Brother      Breast cancer Neg Hx         Social History     Socioeconomic History    Marital status: Legally    Tobacco Use    Smoking status: Never    Smokeless tobacco: Never   Substance and Sexual Activity    Alcohol use: Never    Drug use: Never    Sexual activity: Not Currently      Birth control/protection: See Surgical Hx     Social Determinants of Health     Financial Resource Strain: Medium Risk (7/2/2023)    Overall Financial Resource Strain (CARDIA)     Difficulty of Paying Living Expenses: Somewhat hard   Food Insecurity: No Food Insecurity (7/2/2023)    Hunger Vital Sign     Worried About Running Out of Food in the Last Year: Never true     Ran Out of Food in the Last Year: Never true   Transportation Needs: No Transportation Needs (7/2/2023)    PRAPARE - Transportation     Lack of Transportation (Medical): No     Lack of Transportation (Non-Medical): No   Physical Activity: Inactive (7/2/2023)    Exercise Vital Sign     Days of Exercise per Week: 0 days     Minutes of Exercise per Session: 0 min   Stress: Stress Concern Present (7/2/2023)    [employer] [place] [employer] - Occupational Stress Questionnaire     Feeling of Stress : Very much   Social Connections: Moderately Isolated (7/2/2023)    Social Connection and Isolation Panel [NHANES]     Frequency of Communication with Friends and Family: More than three times a week     Frequency of Social Gatherings with Friends and Family: More than three times a week     Attends Yazdanism Services: More than 4 times per year     Active Member of Clubs or Organizations: No     Attends Club or Organization Meetings: Never     Marital Status:    Housing Stability: Unknown (7/2/2023)    Housing Stability Vital Sign     Unable to Pay for Housing in the Last Year: No     Unstable Housing in the Last Year: No       Current Outpatient Medications   Medication Sig Dispense Refill    acetaminophen (TYLENOL) 325 MG tablet Take 2 tablets (650 mg total) by mouth every 4 (four) hours as needed. 30 tablet 0    atogepant (QULIPTA) 60 mg Tab Take 60 mg by mouth once daily. 30 tablet 3    baclofen (LIORESAL) 10 MG tablet Take 1 tablet (10 mg total) by mouth 2 (two) times daily. 60 tablet 11    bisacodyL (DULCOLAX) 10 mg Supp Place 1  suppository (10 mg total) rectally daily as needed (Until bowel movement if patient has no bowel movement for 2 days). 30 suppository 0    calcium carbonate (CALCIUM 300 ORAL) Take 50 mg by mouth Daily.      clopidogrel (PLAVIX) 75 mg tablet Take 75 mg by mouth once daily.      cyanocobalamin, vitamin B-12, 50 mcg tablet Take 50 mcg by mouth once daily.      fluticasone propionate (FLONASE) 50 mcg/actuation nasal spray 1 spray by Each Nostril route daily as needed.      gabapentin (NEURONTIN) 100 MG capsule TAKE ONE CAPSULE BY MOUTH THREE TIMES DAILY 30 capsule 1    levETIRAcetam (KEPPRA) 500 MG Tab Take 1 tablet (500 mg total) by mouth 2 (two) times daily. 60 tablet 11    magnesium oxide (MAG-OX) 400 mg (241.3 mg magnesium) tablet TAKE ONE TABLET BY MOUTH ONCE DAILY 30 tablet 5    montelukast (SINGULAIR) 10 mg tablet Take 10 mg by mouth every evening.      multivitamin Tab Take 1 tablet by mouth once daily.      ondansetron (ZOFRAN) 4 MG tablet Take 1 tablet (4 mg total) by mouth every 8 (eight) hours as needed. 90 tablet 0    pantoprazole (PROTONIX) 40 MG tablet Take 40 mg by mouth daily as needed.  2    polyethylene glycol (GLYCOLAX) 17 gram PwPk Take 17 g by mouth daily as needed (constipation). 30 packet 3    aspirin (ECOTRIN) 325 MG EC tablet Take 1 tablet (325 mg total) by mouth once daily.  0    atorvastatin (LIPITOR) 80 MG tablet Take 1 tablet (80 mg total) by mouth once daily. 90 tablet 3    FLUoxetine 20 MG capsule Take 1 capsule (20 mg total) by mouth every evening. 30 capsule 11     No current facility-administered medications for this visit.       Review of patient's allergies indicates:  No Known Allergies    Review of System:   General: no chills, fever, night sweats, weight gain or weight loss AS ABOVE  Psychological: no depression or suicidal ideation  Breasts: no new or changing breast lumps, nipple discharge or masses.  Respiratory: no cough, shortness of breath, or wheezing  Cardiovascular: no  chest pain or dyspnea on exertion  Gastrointestinal: no abdominal pain, change in bowel habits, or black or bloody stools  Genito-Urinary: no incontinence, urinary frequency/urgency or vulvar/vaginal symptoms, pelvic pain or abnormal vaginal bleeding.  Musculoskeletal: no gait disturbance or muscular weakness     PE:   APPEARANCE: Well nourished, well developed, in no acute distress.  NECK: Neck symmetric without masses or thyromegaly.  NODES: No inguinal lymph node enlargement.  ABDOMEN: Soft. No tenderness or masses. No hepatosplenomegaly. No hernias.  BREASTS: Symmetrical, no skin changes or visible lesions. No palpable masses, nipple discharge or adenopathy bilaterally.  PELVIC: Normal external female genitalia without lesions. Normal hair distribution.  FEW SCATTERED RIGHT INGUINAL NODES NO INDURATION, NT NO CREPITAMNCEAdequate perineal body, normal urethral meatus. Vagina moist and well rugated without lesions or discharge. No significant cystocele or rectocele. Uterus and cervix surgically absent. Bimanual exam revealed no masses, tenderness or abnormality.    I discussed reactive adenopathy and potentieal etioloies  I will obtain CBC, Lyme panel, IGG and C reactive protein  Possible steroid coirse and referrel to Heme/Onc if unresolved or no overt source    I spent a total of 30 minutes on the day of the visit. This includes face to face time and non-face to face time preparing to see the patient (eg, review of tests), obtaining and/or reviewing separately obtained history, documenting clinical information in the electronic or other health record, independently interpreting results and communicating results to the patient/family/caregiver, or care coordinator.       NOTE  NURSING PERSONAL PRESENT FOR ENTIRE PHYSICAL EXAM

## 2024-04-16 LAB
BASOPHILS # BLD AUTO: 0.07 K/UL (ref 0–0.2)
BASOPHILS NFR BLD: 0.9 % (ref 0–1.9)
CRP SERPL-MCNC: 0.5 MG/L (ref 0–8.2)
DIFFERENTIAL METHOD BLD: ABNORMAL
EOSINOPHIL # BLD AUTO: 0.2 K/UL (ref 0–0.5)
EOSINOPHIL NFR BLD: 2.6 % (ref 0–8)
ERYTHROCYTE [DISTWIDTH] IN BLOOD BY AUTOMATED COUNT: 12.5 % (ref 11.5–14.5)
HCT VFR BLD AUTO: 44.7 % (ref 37–48.5)
HGB BLD-MCNC: 14.1 G/DL (ref 12–16)
IGA SERPL-MCNC: 117 MG/DL (ref 40–350)
IMM GRANULOCYTES # BLD AUTO: 0.03 K/UL (ref 0–0.04)
IMM GRANULOCYTES NFR BLD AUTO: 0.4 % (ref 0–0.5)
LYMPHOCYTES # BLD AUTO: 2.5 K/UL (ref 1–4.8)
LYMPHOCYTES NFR BLD: 30.9 % (ref 18–48)
MCH RBC QN AUTO: 30.3 PG (ref 27–31)
MCHC RBC AUTO-ENTMCNC: 31.5 G/DL (ref 32–36)
MCV RBC AUTO: 96 FL (ref 82–98)
MONOCYTES # BLD AUTO: 0.6 K/UL (ref 0.3–1)
MONOCYTES NFR BLD: 7.9 % (ref 4–15)
NEUTROPHILS # BLD AUTO: 4.6 K/UL (ref 1.8–7.7)
NEUTROPHILS NFR BLD: 57.3 % (ref 38–73)
NRBC BLD-RTO: 0 /100 WBC
PLATELET # BLD AUTO: 237 K/UL (ref 150–450)
PMV BLD AUTO: 11.6 FL (ref 9.2–12.9)
RBC # BLD AUTO: 4.66 M/UL (ref 4–5.4)
RPR SER QL: NORMAL
WBC # BLD AUTO: 8.02 K/UL (ref 3.9–12.7)

## 2024-04-18 LAB — B BURGDOR AB SER IA-ACNC: 0.07 INDEX VALUE

## 2024-05-16 RX ORDER — LANOLIN ALCOHOL/MO/W.PET/CERES
CREAM (GRAM) TOPICAL
Qty: 30 TABLET | Refills: 5 | Status: SHIPPED | OUTPATIENT
Start: 2024-05-16

## 2024-06-25 DIAGNOSIS — Z12.39 ENCOUNTER FOR SCREENING FOR MALIGNANT NEOPLASM OF BREAST, UNSPECIFIED SCREENING MODALITY: Primary | ICD-10-CM

## 2024-06-26 ENCOUNTER — TELEPHONE (OUTPATIENT)
Dept: OBSTETRICS AND GYNECOLOGY | Facility: CLINIC | Age: 44
End: 2024-06-26
Payer: MEDICAID

## 2024-06-26 NOTE — TELEPHONE ENCOUNTER
----- Message from [place] sent at 6/25/2024  2:52 PM CDT -----  Contact: Self  Type:  Mammogram    Caller is requesting to schedule their annual mammogram appointment.  Order is not listed in EPIC.  Please enter order and contact patient to schedule.    Name of Caller:  Patient  Where would they like the mammogram performed?  [facility] Call Back Number:  [phone]  Additional Information: Called to bertin from EPIC notice letter

## 2025-04-07 ENCOUNTER — OFFICE VISIT (OUTPATIENT)
Dept: OBSTETRICS AND GYNECOLOGY | Facility: CLINIC | Age: 45
End: 2025-04-07
Payer: MEDICAID

## 2025-04-07 ENCOUNTER — HOSPITAL ENCOUNTER (OUTPATIENT)
Dept: RADIOLOGY | Facility: HOSPITAL | Age: 45
Discharge: HOME | End: 2025-04-07
Attending: SPECIALIST
Payer: MEDICAID

## 2025-04-07 VITALS
DIASTOLIC BLOOD PRESSURE: 72 MMHG | BODY MASS INDEX: 31.79 KG/M2 | SYSTOLIC BLOOD PRESSURE: 118 MMHG | WEIGHT: 179.44 LBS

## 2025-04-07 DIAGNOSIS — Z12.39 ENCOUNTER FOR SCREENING FOR MALIGNANT NEOPLASM OF BREAST, UNSPECIFIED SCREENING MODALITY: ICD-10-CM

## 2025-04-07 DIAGNOSIS — Z01.419 WELL WOMAN EXAM: Primary | ICD-10-CM

## 2025-04-07 PROCEDURE — 77067 SCR MAMMO BI INCL CAD: CPT | Mod: 26,,, | Performed by: RADIOLOGY

## 2025-04-07 PROCEDURE — 77067 SCR MAMMO BI INCL CAD: CPT | Mod: TC,PN

## 2025-04-07 PROCEDURE — 99999 PR PBB SHADOW E&M-EST. PATIENT-LVL III: CPT | Mod: PBBFAC,,, | Performed by: SPECIALIST

## 2025-04-07 PROCEDURE — 99213 OFFICE O/P EST LOW 20 MIN: CPT | Mod: PBBFAC,PN | Performed by: SPECIALIST

## 2025-04-07 PROCEDURE — 77063 BREAST TOMOSYNTHESIS BI: CPT | Mod: 26,,, | Performed by: RADIOLOGY

## 2025-04-07 NOTE — PROGRESS NOTES
46 yo WF, history hyst presents for annual WW eval and screening mammogram No overt gyn complaints  Past Medical History:   Diagnosis Date    Dysarthria     Hemiparesis affecting right side as late effect of cerebrovascular accident (CVA)     High cholesterol     Hyperreflexia of lower extremity     [place]     Stroke 06/21/2019    Stroke 07/2019       Past Surgical History:   Procedure Laterality Date    CEREBRAL ANGIOGRAM N/A 10/29/2019    Procedure: ANGIOGRAM-CEREBRAL;  Surgeon: [facility] Diagnostic Provider;  [facility];  Service: Radiology;  Laterality: N/A;   190/[place]    CEREBRAL ANGIOGRAM N/A 08/18/2020    Procedure: ANGIOGRAM-CEREBRAL;  Surgeon: [facility] Diagnostic Provider;  Location: [facility] OR 2ND FLR;  Service: Radiology;  Laterality: N/A;  Rm190/[place]    LAPAROSCOPIC SALPINGECTOMY Bilateral 11/08/2021    Procedure: SALPINGECTOMY, LAPAROSCOPIC;  Surgeon: Koko Calle MD;  Location: [facility] OR;  Service: OB/GYN;  Laterality: Bilateral;    LAPAROSCOPIC TOTAL HYSTERECTOMY N/A 11/08/2021    Procedure: HYSTERECTOMY, TOTAL, LAPAROSCOPIC;  Surgeon: Koko Calle MD;  Location: [facility] OR;  Service: OB/GYN;  Laterality: N/A;    TUBAL LIGATION  2005       Family History   Problem Relation Name Age of Onset    Hyperlipidemia Mother      Stroke Father      Diabetes Father      Heart disease Father      Hyperlipidemia Sister      Hypertension Sister      Cancer Brother          throat cancer    Asthma Brother      Breast cancer Neg Hx         Social History     Socioeconomic History    Marital status: Legally    Tobacco Use    Smoking status: Never    Smokeless tobacco: Never   Substance and Sexual Activity    Alcohol use: Never    Drug use: Never    Sexual activity: Yes     Birth control/protection: See Surgical Hx     Social Drivers of Health     Financial Resource Strain: Medium Risk (7/2/2023)    Overall Financial Resource Strain (CARDIA)     Difficulty of Paying Living Expenses: Somewhat  hard   Food Insecurity: No Food Insecurity (7/2/2023)    Hunger Vital Sign     Worried About Running Out of Food in the Last Year: Never true     Ran Out of Food in the Last Year: Never true   Transportation Needs: No Transportation Needs (7/2/2023)    PRAPARE - Transportation     Lack of Transportation (Medical): No     Lack of Transportation (Non-Medical): No   Physical Activity: Inactive (7/2/2023)    Exercise Vital Sign     Days of Exercise per Week: 0 days     Minutes of Exercise per Session: 0 min   Stress: Stress Concern Present (7/2/2023)    [employer] [place] [employer] - Occupational Stress Questionnaire     Feeling of Stress : Very much   Housing Stability: Unknown (7/2/2023)    Housing Stability Vital Sign     Unable to Pay for Housing in the Last Year: No     Unstable Housing in the Last Year: No       Current Medications[1]    Review of patient's allergies indicates:  No Known Allergies    Review of System:   General: no chills, fever, night sweats, weight gain or weight loss  Psychological: no depression or suicidal ideation  Breasts: no new or changing breast lumps, nipple discharge or masses.  Respiratory: no cough, shortness of breath, or wheezing  Cardiovascular: no chest pain or dyspnea on exertion  Gastrointestinal: no abdominal pain, change in bowel habits, or black or bloody stools  Genito-Urinary: no incontinence, urinary frequency/urgency or vulvar/vaginal symptoms, pelvic pain or abnormal vaginal bleeding.  Musculoskeletal: no gait disturbance or muscular weakness     PE:   APPEARANCE: Well nourished, well developed, in no acute distress.  NECK: Neck symmetric without masses or thyromegaly.  NODES: No inguinal lymph node enlargement.  ABDOMEN: Soft. No tenderness or masses. No hepatosplenomegaly. No hernias.  BREASTS: Symmetrical, no skin changes or visible lesions. No palpable masses, nipple discharge or adenopathy bilaterally.  PELVIC: Normal external female genitalia without  lesions. Normal hair distribution. Adequate perineal body, normal urethral meatus. Vagina moist and well rugated without lesions or discharge. No significant cystocele or rectocele. Uterus and cervix surgically absent. Bimanual exam revealed no masses, tenderness or abnormality.    NOTE  NURSING PERSONAL PRESENT FOR ENTIRE PHYSICAL EXAM     Plan  BSE monthly  Screening mammogram today and repeat yearly  RTO 2 years/prn    I spent a total of 30 minutes on the day of the visit. This includes face to face time and non-face to face time preparing to see the patient (eg, review of tests), obtaining and/or reviewing separately obtained history, documenting clinical information in the electronic or other health record, independently interpreting results and communicating results to the patient/family/caregiver, or care coordinator.            [1]   Current Outpatient Medications   Medication Sig Dispense Refill    acetaminophen (TYLENOL) 325 MG tablet Take 2 tablets (650 mg total) by mouth every 4 (four) hours as needed. 30 tablet 0    aspirin (ECOTRIN) 325 MG EC tablet Take 1 tablet (325 mg total) by mouth once daily.  0    atogepant (QULIPTA) 60 mg Tab Take 60 mg by mouth once daily. 30 tablet 3    atorvastatin (LIPITOR) 80 MG tablet Take 1 tablet (80 mg total) by mouth once daily. 90 tablet 3    baclofen (LIORESAL) 10 MG tablet Take 1 tablet (10 mg total) by mouth 2 (two) times daily. 60 tablet 11    bisacodyL (DULCOLAX) 10 mg Supp Place 1 suppository (10 mg total) rectally daily as needed (Until bowel movement if patient has no bowel movement for 2 days). 30 suppository 0    calcium carbonate (CALCIUM 300 ORAL) Take 50 mg by mouth Daily.      clopidogrel (PLAVIX) 75 mg tablet Take 75 mg by mouth once daily.      cyanocobalamin, vitamin B-12, 50 mcg tablet Take 50 mcg by mouth once daily.      FLUoxetine 20 MG capsule Take 1 capsule (20 mg total) by mouth every evening. 30 capsule 11    fluticasone propionate (FLONASE)  50 mcg/actuation nasal spray 1 spray by Each Nostril route daily as needed.      gabapentin (NEURONTIN) 100 MG capsule TAKE ONE CAPSULE BY MOUTH THREE TIMES DAILY 30 capsule 1    magnesium oxide (MAG-OX) 400 mg (241.3 mg magnesium) tablet TAKE ONE TABLET BY MOUTH ONCE DAILY 30 tablet 5    montelukast (SINGULAIR) 10 mg tablet Take 10 mg by mouth every evening.      multivitamin Tab Take 1 tablet by mouth once daily.      ondansetron (ZOFRAN) 4 MG tablet Take 1 tablet (4 mg total) by mouth every 8 (eight) hours as needed. 90 tablet 0    polyethylene glycol (GLYCOLAX) 17 gram PwPk Take 17 g by mouth daily as needed (constipation). 30 packet 3    levETIRAcetam (KEPPRA) 500 MG Tab Take 1 tablet (500 mg total) by mouth 2 (two) times daily. 60 tablet 11    pantoprazole (PROTONIX) 40 MG tablet Take 40 mg by mouth daily as needed. (Patient not taking: Reported on 4/7/2025)  2     No current facility-administered medications for this visit.

## 2025-04-09 ENCOUNTER — RESULTS FOLLOW-UP (OUTPATIENT)
Dept: OBSTETRICS AND GYNECOLOGY | Facility: CLINIC | Age: 45
End: 2025-04-09

## 2025-05-08 ENCOUNTER — TELEPHONE (OUTPATIENT)
Dept: NEUROLOGY | Facility: CLINIC | Age: 45
End: 2025-05-08
Payer: MEDICAID

## 2025-05-08 NOTE — TELEPHONE ENCOUNTER
----- Message from Caridad Benjamin MD sent at 5/6/2025  4:25 PM CDT -----  Regarding: RE: Appt  Contact: [phone]  You can schedule in June -- offer virtual  ----- Message -----  From: Carlos Manuel Mathias MA  Sent: 5/6/2025  10:22 AM CDT  To: Caridad Benjamin MD  Subject: FW: Appt                                         Would you like me to schedule her next available or would she need to be seen elsewhere?  ----- Message -----  From: Conrado Oneal  Sent: 5/6/2025  10:17 AM CDT  To: Sourav Mclean Staff  Subject: Appt                                             Pt calling to schedule appt, headaches. Please call  [phone]

## 2025-06-09 ENCOUNTER — OFFICE VISIT (OUTPATIENT)
Dept: NEUROLOGY | Facility: CLINIC | Age: 45
End: 2025-06-09
Payer: MEDICAID

## 2025-06-09 DIAGNOSIS — Z86.73 HISTORY OF ISCHEMIC LEFT MCA STROKE: Primary | Chronic | ICD-10-CM

## 2025-06-09 DIAGNOSIS — R51.9 CHRONIC RIGHT-SIDED HEADACHES: Chronic | ICD-10-CM

## 2025-06-09 DIAGNOSIS — G43.719 INTRACTABLE CHRONIC MIGRAINE WITHOUT AURA AND WITHOUT STATUS MIGRAINOSUS: ICD-10-CM

## 2025-06-09 DIAGNOSIS — G89.29 CHRONIC RIGHT-SIDED HEADACHES: Chronic | ICD-10-CM

## 2025-06-09 PROCEDURE — 98007 SYNCH AUDIO-VIDEO EST HI 40: CPT | Mod: 95,,, | Performed by: PSYCHIATRY & NEUROLOGY

## 2025-06-12 RX ORDER — ATOGEPANT 60 MG/1
60 TABLET ORAL DAILY
Qty: 30 TABLET | Refills: 2 | Status: SHIPPED | OUTPATIENT
Start: 2025-06-12 | End: 2025-09-10

## 2025-06-12 NOTE — PROGRESS NOTES
"  Vascular Neurology  Virtual Visit    Reason For Visit (Chief Complaint): headaches, Leslie Leslie f/u    HPI: Ms. Ponce is a 45 y.o. woman, previously healthy, who developed R hemiparesis and speech difficulty on 6/21/19, found to have acute L-BG stroke.  She was transferred from [place] to [facility] in early August for headaches and worsening R sided symptoms, and concern for vasculitis.      Briefly, Ms. Ponce represented to [place] in late July for persistent headaches and worsening of R sided weakness. MRI brain was negative for new strokes, but did show stenotic L-MCA that the OSH interpreted as vasculitic changes.  She was started on steroids and underwent LP which was bland (2->0 WBC, P31, Glu70).  She was transferred to [facility] for angio, which showed tapering to near occlusion of LM1, with numerous lenticulostriate collateral vessels, in a MoyaMoya pattern.  No other vascular abnormalities to suggest vasculitis were seen.   DAPT was resumed and she was treated for nausea and headaches.  She was discharged home with outpatient therapy.    Taking tylenol about every other day for sharp pains in R side of head.  Described as brief stabbing like pain.  Taking gabapentin for leg pain and headaches.  No longer taking amitriptyline.  Reports drinking plenty of water.   Mood has been okay.  Slipped recently in bathtub - doesn't like to use her shower chair.  Had hysterectomy without incident.     Continues to have intermittent, various neurological symptoms.  R leg often goes to sleep when she sits down.  Also noted R inguinal pain.  Some days she seems really "off."   Texts to family sometimes don't make sense (happens 1-2x week).  Using a cane to walk to to prevent falls.  Very sensitive to bright lights and flashign lights --> started after starting Keppra.  Started on medciation for heaaches by PCP (got samples of Qulipta, which helps HA).        Interval History:  Continues to have nearly daily HA.  Also with scalp " sensitivity when she touches her head.  HA worse if she hasn't drunk enough water.  Takes Ubrelvy which resolves HA in ~2 hours.  No other new neuro sx.    Brain Imaging:  MRI Brain w/ Perfusion 10/23/23:  MRI brain: Small remote left basal gangliar infarction with prior hemorrhage unchanged.  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  MR perfusion: Elevated mean transit time left posterior corona radiata/parietal lobe with preserved relative cerebral blood flow and blood volume suggestive for underlying compensated stenosis in light of MRA findings.  No evidence for ischemia/oligemia.  MRA head: Moderate left distal M1 proximal M2 MCA stenosis similar to prior.  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      Cardiac Evaluation:  TTE 7/29/19:  LVEF 55%  NL LV diastolic dysfunction  Neg bubble    Relevant Labwork:  Recent Labs   Lab 07/02/23  0436   Hemoglobin A1C 5.3           I independently viewed the above imaging studies and  I reviewed the reports of the above imaging.  I reviewed the above labwork.    Review of Systems  Msk: negative for muscle pain  Skin: negative for pruritis  Neuro: + HA, imbalance  All others negative    Past Medical History  Past Medical History:   Diagnosis Date    Dysarthria     Hemiparesis affecting right side as late effect of cerebrovascular accident (CVA)     High cholesterol     Hyperreflexia of lower extremity     Moyamoya     Stroke 06/21/2019    Stroke 07/2019     Family History  No family history stroke or neurological disease.    Social History  Completed high school.  Was working mowing lawns prior to stroke.  Non-smoker.    Medication List with Changes/Refills   New Medications    ATOGEPANT  (QULIPTA) 60 MG TAB    Take 1 tablet (60 mg total) by mouth once daily.   Current Medications    ACETAMINOPHEN (TYLENOL) 325 MG TABLET    Take 2 tablets (650 mg total) by mouth every 4 (four) hours as needed.    ASPIRIN (ECOTRIN) 325 MG EC TABLET    Take 1 tablet (325 mg total) by mouth once daily.    ATORVASTATIN (LIPITOR) 80 MG TABLET    Take 1 tablet (80 mg total) by mouth once daily.    BACLOFEN (LIORESAL) 10 MG TABLET    Take 1 tablet (10 mg total) by mouth 2 (two) times daily.    BISACODYL (DULCOLAX) 10 MG SUPP    Place 1 suppository (10 mg total) rectally daily as needed (Until bowel movement if patient has no bowel movement for 2 days).    CALCIUM CARBONATE (CALCIUM 300 ORAL)    Take 50 mg by mouth Daily.    CLOPIDOGREL (PLAVIX) 75 MG TABLET    Take 75 mg by mouth once daily.    CYANOCOBALAMIN, VITAMIN B-12, 50 MCG TABLET    Take 50 mcg by mouth once daily.    FLUOXETINE 20 MG CAPSULE    Take 1 capsule (20 mg total) by mouth every evening.    FLUTICASONE PROPIONATE (FLONASE) 50 MCG/ACTUATION NASAL SPRAY    1 spray by Each Nostril route daily as needed.    GABAPENTIN (NEURONTIN) 100 MG CAPSULE    TAKE ONE CAPSULE BY MOUTH THREE TIMES DAILY    LEVETIRACETAM (KEPPRA) 500 MG TAB    Take 1 tablet (500 mg total) by mouth 2 (two) times daily.    MAGNESIUM OXIDE (MAG-OX) 400 MG (241.3 MG MAGNESIUM) TABLET    TAKE ONE TABLET BY MOUTH ONCE DAILY    MONTELUKAST (SINGULAIR) 10 MG TABLET    Take 10 mg by mouth every evening.    MULTIVITAMIN TAB    Take 1 tablet by mouth once daily.    ONDANSETRON (ZOFRAN) 4 MG TABLET    Take 1 tablet (4 mg total) by mouth every 8 (eight) hours as needed.    PANTOPRAZOLE (PROTONIX) 40 MG TABLET    Take 40 mg by mouth daily as needed.    POLYETHYLENE GLYCOL (GLYCOLAX) 17 GRAM PWPK    Take 17 g by mouth daily as needed (constipation).   Discontinued Medications    ATOGEPANT (QULIPTA) 60 MG TAB    Take 60 mg by mouth once daily.       EXAM   ]    General: well appearing without discomfort    Neck: full ROM  Eyes: conjunctiva clear, no icterus  CV: RRR, nL S1&S2  Resp: breathing comfortably, no wheezing  Ext: wwp, no edema    Mental Status: Alert and oriented, normal attention, speech fluent and prosodic, naming and repetition intact, follows multistep embedded commands, no e/o neglect or extinction  Cranial Nerves: PERRL, EOMI, VFF, sensation reduced R face, L facial, droop mildly disarthric, SCM/trap 5/5  Motor: Orbits R arm  Giveway weakness L should abd - remainder 5/5 throughout  Sensory: reduced sensation R arm/leg  Coordination: no ataxia on finger-to-nose  Gait & Stance: drags R leg slightly     ___________________  ASSESSMENT & PLAN  Ms. Ponce is a 39 y.o. 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  Continues to have ongoing, chronic symptoms such as HA, numbness, cognitive issues post-stroke.  Recent MRI brain (September 2023) showed well compensated hemodynamics and no new stroke.  Continues to have daily headaches, taking PRN Ubrelvy.  Given frequency of HA (>20/mo) will prescribe abortive.    - Qulipta 60 mg daily  - Cont Keppra 500 mg bid.  - Cont DAPT and atorvastatin 80 mg qhs for stroke prevention  - Surveillance MRI perfusion in the fall  - Stay well hydrated with water.  - RTC 6 mos.    Problem List Items Addressed This Visit          Unprioritized    History of ischemic left MCA stroke - Primary (Chronic)    Relevant Orders    MRI Brain (Tumor with Perfusion) W W/O Contrast (XPD)    Creatinine, serum    Chronic right-sided headaches (Chronic)     Other Visit Diagnoses         Intractable chronic migraine without aura and without status migrainosus        Relevant Medications    atogepant (QULIPTA) 60 mg Tab          Caridad Benjamin MD  Vascular Neurology      The patient location is: [place]  The chief complaint leading to consultation is: headache    Visit type:  audiovisual    Face to Face time with patient: 30 minutes of total time spent on the encounter, which includes face to face time and non-face to face time preparing to see the patient (eg, review of tests), Obtaining and/or reviewing separately obtained history, Documenting clinical information in the electronic or other health record, Independently interpreting results (not separately reported) and communicating results to the patient/family/caregiver, or Care coordination (not separately reported).     Each patient to whom he or she provides medical services by telemedicine is:  (1) informed of the relationship between the physician and patient and the respective role of any other health care provider with respect to management of the patient; and (2) notified that he or she may decline to receive medical services by telemedicine and may withdraw from such care at any time.

## 2025-07-18 ENCOUNTER — HOSPITAL ENCOUNTER (OUTPATIENT)
Dept: RADIOLOGY | Facility: HOSPITAL | Age: 45
Discharge: HOME | End: 2025-07-18
Attending: PSYCHIATRY & NEUROLOGY
Payer: MEDICAID

## 2025-07-18 DIAGNOSIS — Z86.73 HISTORY OF ISCHEMIC LEFT MCA STROKE: Chronic | ICD-10-CM

## 2025-07-18 PROCEDURE — A9585 GADOBUTROL INJECTION: HCPCS | Mod: PO | Performed by: PSYCHIATRY & NEUROLOGY

## 2025-07-18 PROCEDURE — 25500020 PHARM REV CODE 255: Mod: PO | Performed by: PSYCHIATRY & NEUROLOGY

## 2025-07-18 PROCEDURE — 70553 MRI BRAIN STEM W/O & W/DYE: CPT | Mod: TC,PO

## 2025-07-18 RX ORDER — GADOBUTROL 604.72 MG/ML
10 INJECTION INTRAVENOUS
Status: COMPLETED | OUTPATIENT
Start: 2025-07-18 | End: 2025-07-18

## 2025-07-18 RX ADMIN — GADOBUTROL 10 ML: 604.72 INJECTION INTRAVENOUS at 10:07
